# Patient Record
Sex: MALE | Race: WHITE | Employment: UNEMPLOYED | URBAN - METROPOLITAN AREA
[De-identification: names, ages, dates, MRNs, and addresses within clinical notes are randomized per-mention and may not be internally consistent; named-entity substitution may affect disease eponyms.]

---

## 2017-08-11 ENCOUNTER — APPOINTMENT (OUTPATIENT)
Dept: GENERAL RADIOLOGY | Age: 47
DRG: 313 | End: 2017-08-11
Payer: COMMERCIAL

## 2017-08-11 ENCOUNTER — APPOINTMENT (OUTPATIENT)
Dept: CT IMAGING | Age: 47
DRG: 313 | End: 2017-08-11
Payer: COMMERCIAL

## 2017-08-11 ENCOUNTER — HOSPITAL ENCOUNTER (OUTPATIENT)
Age: 47
Setting detail: OBSERVATION
Discharge: HOME | DRG: 313 | End: 2017-08-12
Attending: EMERGENCY MEDICINE | Admitting: INTERNAL MEDICINE
Payer: COMMERCIAL

## 2017-08-11 DIAGNOSIS — R07.89 OTHER CHEST PAIN: Primary | ICD-10-CM

## 2017-08-11 LAB
ABSOLUTE EOS #: 0.3 K/UL (ref 0–0.4)
ABSOLUTE LYMPH #: 2.4 K/UL (ref 1–4.8)
ABSOLUTE MONO #: 0.9 K/UL (ref 0.1–1.2)
ANION GAP SERPL CALCULATED.3IONS-SCNC: 11 MMOL/L (ref 9–17)
BASOPHILS # BLD: 0 %
BASOPHILS ABSOLUTE: 0 K/UL (ref 0–0.2)
BNP INTERPRETATION: NORMAL
BUN BLDV-MCNC: 18 MG/DL (ref 6–20)
BUN/CREAT BLD: NORMAL (ref 9–20)
CALCIUM SERPL-MCNC: 9.1 MG/DL (ref 8.6–10.4)
CHLORIDE BLD-SCNC: 101 MMOL/L (ref 98–107)
CO2: 28 MMOL/L (ref 20–31)
CREAT SERPL-MCNC: 0.71 MG/DL (ref 0.7–1.2)
DIFFERENTIAL TYPE: NORMAL
EOSINOPHILS RELATIVE PERCENT: 3 %
GFR AFRICAN AMERICAN: >60 ML/MIN
GFR NON-AFRICAN AMERICAN: >60 ML/MIN
GFR SERPL CREATININE-BSD FRML MDRD: NORMAL ML/MIN/{1.73_M2}
GFR SERPL CREATININE-BSD FRML MDRD: NORMAL ML/MIN/{1.73_M2}
GLUCOSE BLD-MCNC: 94 MG/DL (ref 70–99)
HCT VFR BLD CALC: 47.9 % (ref 41–53)
HEMOGLOBIN: 16.1 G/DL (ref 13.5–17.5)
LYMPHOCYTES # BLD: 22 %
MAGNESIUM: 2.4 MG/DL (ref 1.6–2.6)
MCH RBC QN AUTO: 30.3 PG (ref 26–34)
MCHC RBC AUTO-ENTMCNC: 33.5 G/DL (ref 31–37)
MCV RBC AUTO: 90.3 FL (ref 80–100)
MONOCYTES # BLD: 9 %
PDW BLD-RTO: 15.3 % (ref 12.5–15.4)
PLATELET # BLD: 237 K/UL (ref 140–450)
PLATELET ESTIMATE: NORMAL
PMV BLD AUTO: 8.6 FL (ref 6–12)
POC TROPONIN I: 0 NG/ML (ref 0–0.1)
POC TROPONIN I: 0 NG/ML (ref 0–0.1)
POC TROPONIN INTERP: NORMAL
POC TROPONIN INTERP: NORMAL
POTASSIUM SERPL-SCNC: 4.3 MMOL/L (ref 3.7–5.3)
PRO-BNP: 23 PG/ML
RBC # BLD: 5.31 M/UL (ref 4.5–5.9)
RBC # BLD: NORMAL 10*6/UL
SEG NEUTROPHILS: 66 %
SEGMENTED NEUTROPHILS ABSOLUTE COUNT: 7 K/UL (ref 1.8–7.7)
SODIUM BLD-SCNC: 140 MMOL/L (ref 135–144)
TROPONIN INTERP: NORMAL
TROPONIN T: <0.03 NG/ML
TSH SERPL DL<=0.05 MIU/L-ACNC: 0.98 MIU/L (ref 0.3–5)
WBC # BLD: 10.6 K/UL (ref 3.5–11)
WBC # BLD: NORMAL 10*3/UL

## 2017-08-11 PROCEDURE — 80048 BASIC METABOLIC PNL TOTAL CA: CPT

## 2017-08-11 PROCEDURE — 96372 THER/PROPH/DIAG INJ SC/IM: CPT

## 2017-08-11 PROCEDURE — 6370000000 HC RX 637 (ALT 250 FOR IP): Performed by: INTERNAL MEDICINE

## 2017-08-11 PROCEDURE — 99285 EMERGENCY DEPT VISIT HI MDM: CPT

## 2017-08-11 PROCEDURE — G0378 HOSPITAL OBSERVATION PER HR: HCPCS

## 2017-08-11 PROCEDURE — 93005 ELECTROCARDIOGRAM TRACING: CPT

## 2017-08-11 PROCEDURE — 6360000004 HC RX CONTRAST MEDICATION: Performed by: STUDENT IN AN ORGANIZED HEALTH CARE EDUCATION/TRAINING PROGRAM

## 2017-08-11 PROCEDURE — 84443 ASSAY THYROID STIM HORMONE: CPT

## 2017-08-11 PROCEDURE — 83735 ASSAY OF MAGNESIUM: CPT

## 2017-08-11 PROCEDURE — 1200000000 HC SEMI PRIVATE

## 2017-08-11 PROCEDURE — 84484 ASSAY OF TROPONIN QUANT: CPT

## 2017-08-11 PROCEDURE — 85025 COMPLETE CBC W/AUTO DIFF WBC: CPT

## 2017-08-11 PROCEDURE — 71275 CT ANGIOGRAPHY CHEST: CPT

## 2017-08-11 PROCEDURE — 2580000003 HC RX 258: Performed by: INTERNAL MEDICINE

## 2017-08-11 PROCEDURE — 6360000002 HC RX W HCPCS: Performed by: INTERNAL MEDICINE

## 2017-08-11 PROCEDURE — 6370000000 HC RX 637 (ALT 250 FOR IP): Performed by: STUDENT IN AN ORGANIZED HEALTH CARE EDUCATION/TRAINING PROGRAM

## 2017-08-11 PROCEDURE — 71010 XR CHEST PORTABLE: CPT

## 2017-08-11 PROCEDURE — 83880 ASSAY OF NATRIURETIC PEPTIDE: CPT

## 2017-08-11 PROCEDURE — 36415 COLL VENOUS BLD VENIPUNCTURE: CPT

## 2017-08-11 RX ORDER — METOPROLOL TARTRATE 5 MG/5ML
5 INJECTION INTRAVENOUS EVERY 6 HOURS PRN
Status: DISCONTINUED | OUTPATIENT
Start: 2017-08-11 | End: 2017-08-12 | Stop reason: HOSPADM

## 2017-08-11 RX ORDER — POTASSIUM CHLORIDE 20 MEQ/1
40 TABLET, EXTENDED RELEASE ORAL PRN
Status: DISCONTINUED | OUTPATIENT
Start: 2017-08-11 | End: 2017-08-12 | Stop reason: HOSPADM

## 2017-08-11 RX ORDER — NITROGLYCERIN 0.4 MG/1
0.4 TABLET SUBLINGUAL EVERY 5 MIN PRN
Status: DISCONTINUED | OUTPATIENT
Start: 2017-08-11 | End: 2017-08-12 | Stop reason: HOSPADM

## 2017-08-11 RX ORDER — POTASSIUM CHLORIDE 7.45 MG/ML
10 INJECTION INTRAVENOUS PRN
Status: DISCONTINUED | OUTPATIENT
Start: 2017-08-11 | End: 2017-08-12 | Stop reason: HOSPADM

## 2017-08-11 RX ORDER — ONDANSETRON 2 MG/ML
4 INJECTION INTRAMUSCULAR; INTRAVENOUS EVERY 6 HOURS PRN
Status: DISCONTINUED | OUTPATIENT
Start: 2017-08-11 | End: 2017-08-12 | Stop reason: HOSPADM

## 2017-08-11 RX ORDER — MAGNESIUM SULFATE 1 G/100ML
1 INJECTION INTRAVENOUS PRN
Status: DISCONTINUED | OUTPATIENT
Start: 2017-08-11 | End: 2017-08-12 | Stop reason: HOSPADM

## 2017-08-11 RX ORDER — MORPHINE SULFATE 2 MG/ML
2 INJECTION, SOLUTION INTRAMUSCULAR; INTRAVENOUS
Status: DISCONTINUED | OUTPATIENT
Start: 2017-08-11 | End: 2017-08-12 | Stop reason: HOSPADM

## 2017-08-11 RX ORDER — ACETAMINOPHEN 325 MG/1
650 TABLET ORAL EVERY 4 HOURS PRN
Status: DISCONTINUED | OUTPATIENT
Start: 2017-08-11 | End: 2017-08-12 | Stop reason: HOSPADM

## 2017-08-11 RX ORDER — MORPHINE SULFATE 4 MG/ML
4 INJECTION, SOLUTION INTRAMUSCULAR; INTRAVENOUS
Status: DISCONTINUED | OUTPATIENT
Start: 2017-08-11 | End: 2017-08-12 | Stop reason: HOSPADM

## 2017-08-11 RX ORDER — HYDROCODONE BITARTRATE AND ACETAMINOPHEN 5; 325 MG/1; MG/1
1 TABLET ORAL EVERY 4 HOURS PRN
Status: DISCONTINUED | OUTPATIENT
Start: 2017-08-11 | End: 2017-08-12 | Stop reason: HOSPADM

## 2017-08-11 RX ORDER — GABAPENTIN 400 MG/1
400 CAPSULE ORAL NIGHTLY
Status: ON HOLD | COMMUNITY
End: 2021-07-11 | Stop reason: ALTCHOICE

## 2017-08-11 RX ORDER — ALBUTEROL SULFATE 90 UG/1
6 AEROSOL, METERED RESPIRATORY (INHALATION) EVERY 6 HOURS
Status: DISCONTINUED | OUTPATIENT
Start: 2017-08-11 | End: 2017-08-11

## 2017-08-11 RX ORDER — ATORVASTATIN CALCIUM 40 MG/1
40 TABLET, FILM COATED ORAL DAILY
COMMUNITY

## 2017-08-11 RX ORDER — LISINOPRIL 10 MG/1
10 TABLET ORAL DAILY
Status: DISCONTINUED | OUTPATIENT
Start: 2017-08-11 | End: 2017-08-12

## 2017-08-11 RX ORDER — BISACODYL 10 MG
10 SUPPOSITORY, RECTAL RECTAL DAILY PRN
Status: DISCONTINUED | OUTPATIENT
Start: 2017-08-11 | End: 2017-08-12 | Stop reason: HOSPADM

## 2017-08-11 RX ORDER — DOCUSATE SODIUM 100 MG/1
100 CAPSULE, LIQUID FILLED ORAL 2 TIMES DAILY
Status: DISCONTINUED | OUTPATIENT
Start: 2017-08-11 | End: 2017-08-12 | Stop reason: HOSPADM

## 2017-08-11 RX ORDER — ASPIRIN 81 MG/1
324 TABLET, CHEWABLE ORAL ONCE
Status: COMPLETED | OUTPATIENT
Start: 2017-08-11 | End: 2017-08-11

## 2017-08-11 RX ORDER — METOPROLOL TARTRATE 50 MG/1
50 TABLET, FILM COATED ORAL 2 TIMES DAILY
Status: DISCONTINUED | OUTPATIENT
Start: 2017-08-11 | End: 2017-08-12 | Stop reason: HOSPADM

## 2017-08-11 RX ORDER — SODIUM CHLORIDE 0.9 % (FLUSH) 0.9 %
10 SYRINGE (ML) INJECTION PRN
Status: DISCONTINUED | OUTPATIENT
Start: 2017-08-11 | End: 2017-08-12 | Stop reason: HOSPADM

## 2017-08-11 RX ORDER — ALBUTEROL SULFATE 90 UG/1
2 AEROSOL, METERED RESPIRATORY (INHALATION) EVERY 4 HOURS PRN
Status: DISCONTINUED | OUTPATIENT
Start: 2017-08-11 | End: 2017-08-12 | Stop reason: HOSPADM

## 2017-08-11 RX ORDER — POTASSIUM CHLORIDE 20MEQ/15ML
40 LIQUID (ML) ORAL PRN
Status: DISCONTINUED | OUTPATIENT
Start: 2017-08-11 | End: 2017-08-12 | Stop reason: HOSPADM

## 2017-08-11 RX ORDER — LEVOTHYROXINE SODIUM 0.03 MG/1
25 TABLET ORAL DAILY
COMMUNITY

## 2017-08-11 RX ORDER — FAMOTIDINE 20 MG/1
20 TABLET, FILM COATED ORAL 2 TIMES DAILY
Status: DISCONTINUED | OUTPATIENT
Start: 2017-08-11 | End: 2017-08-12 | Stop reason: HOSPADM

## 2017-08-11 RX ORDER — HYDROCHLOROTHIAZIDE 25 MG/1
25 TABLET ORAL DAILY
Status: DISCONTINUED | OUTPATIENT
Start: 2017-08-11 | End: 2017-08-12 | Stop reason: HOSPADM

## 2017-08-11 RX ORDER — SODIUM CHLORIDE 0.9 % (FLUSH) 0.9 %
10 SYRINGE (ML) INJECTION EVERY 12 HOURS SCHEDULED
Status: DISCONTINUED | OUTPATIENT
Start: 2017-08-11 | End: 2017-08-12 | Stop reason: HOSPADM

## 2017-08-11 RX ADMIN — IOVERSOL 100 ML: 741 INJECTION INTRA-ARTERIAL; INTRAVENOUS at 12:26

## 2017-08-11 RX ADMIN — NITROGLYCERIN 0.4 MG: 0.4 TABLET, ORALLY DISINTEGRATING SUBLINGUAL at 13:24

## 2017-08-11 RX ADMIN — METOPROLOL TARTRATE 50 MG: 50 TABLET, FILM COATED ORAL at 17:59

## 2017-08-11 RX ADMIN — HYDROCHLOROTHIAZIDE 25 MG: 25 TABLET ORAL at 17:59

## 2017-08-11 RX ADMIN — LISINOPRIL 10 MG: 10 TABLET ORAL at 20:52

## 2017-08-11 RX ADMIN — FAMOTIDINE 20 MG: 20 TABLET, FILM COATED ORAL at 20:49

## 2017-08-11 RX ADMIN — DOCUSATE SODIUM 100 MG: 100 CAPSULE ORAL at 20:49

## 2017-08-11 RX ADMIN — Medication 10 ML: at 20:52

## 2017-08-11 RX ADMIN — NITROGLYCERIN 0.4 MG: 0.4 TABLET, ORALLY DISINTEGRATING SUBLINGUAL at 11:22

## 2017-08-11 RX ADMIN — ENOXAPARIN SODIUM 40 MG: 40 INJECTION SUBCUTANEOUS at 20:49

## 2017-08-11 RX ADMIN — ASPIRIN 324 MG: 81 TABLET ORAL at 11:00

## 2017-08-11 ASSESSMENT — PAIN DESCRIPTION - LOCATION: LOCATION: CHEST

## 2017-08-11 ASSESSMENT — ENCOUNTER SYMPTOMS
NAUSEA: 0
SHORTNESS OF BREATH: 1
BACK PAIN: 0
SORE THROAT: 0
ABDOMINAL PAIN: 0
TROUBLE SWALLOWING: 0
WHEEZING: 0
VOMITING: 0
COUGH: 0
CHEST TIGHTNESS: 1

## 2017-08-11 ASSESSMENT — PAIN SCALES - GENERAL
PAINLEVEL_OUTOF10: 7
PAINLEVEL_OUTOF10: 3

## 2017-08-11 ASSESSMENT — PAIN DESCRIPTION - PAIN TYPE: TYPE: ACUTE PAIN

## 2017-08-12 ENCOUNTER — APPOINTMENT (OUTPATIENT)
Dept: NUCLEAR MEDICINE | Age: 47
DRG: 313 | End: 2017-08-12
Payer: COMMERCIAL

## 2017-08-12 VITALS
DIASTOLIC BLOOD PRESSURE: 102 MMHG | TEMPERATURE: 97.7 F | BODY MASS INDEX: 38.89 KG/M2 | WEIGHT: 287.1 LBS | HEART RATE: 70 BPM | OXYGEN SATURATION: 91 % | HEIGHT: 72 IN | RESPIRATION RATE: 20 BRPM | SYSTOLIC BLOOD PRESSURE: 158 MMHG

## 2017-08-12 PROBLEM — Z91.199 NONCOMPLIANCE: Status: ACTIVE | Noted: 2017-08-12

## 2017-08-12 PROBLEM — I10 ACCELERATED HYPERTENSION: Status: ACTIVE | Noted: 2017-08-12

## 2017-08-12 PROBLEM — Z72.0 TOBACCO ABUSE: Status: ACTIVE | Noted: 2017-08-12

## 2017-08-12 PROBLEM — R07.89 ATYPICAL CHEST PAIN: Status: ACTIVE | Noted: 2017-08-12

## 2017-08-12 LAB
ALBUMIN SERPL-MCNC: 4 G/DL (ref 3.5–5.2)
ALBUMIN/GLOBULIN RATIO: 1.5 (ref 1–2.5)
ALP BLD-CCNC: 104 U/L (ref 40–129)
ALT SERPL-CCNC: 40 U/L (ref 5–41)
ANION GAP SERPL CALCULATED.3IONS-SCNC: 12 MMOL/L (ref 9–17)
AST SERPL-CCNC: 26 U/L
BILIRUB SERPL-MCNC: 0.36 MG/DL (ref 0.3–1.2)
BUN BLDV-MCNC: 14 MG/DL (ref 6–20)
BUN/CREAT BLD: ABNORMAL (ref 9–20)
CALCIUM SERPL-MCNC: 8.6 MG/DL (ref 8.6–10.4)
CHLORIDE BLD-SCNC: 95 MMOL/L (ref 98–107)
CHOLESTEROL/HDL RATIO: 3.9
CHOLESTEROL: 124 MG/DL
CO2: 24 MMOL/L (ref 20–31)
CREAT SERPL-MCNC: 0.67 MG/DL (ref 0.7–1.2)
GFR AFRICAN AMERICAN: >60 ML/MIN
GFR NON-AFRICAN AMERICAN: >60 ML/MIN
GFR SERPL CREATININE-BSD FRML MDRD: ABNORMAL ML/MIN/{1.73_M2}
GFR SERPL CREATININE-BSD FRML MDRD: ABNORMAL ML/MIN/{1.73_M2}
GLUCOSE BLD-MCNC: 116 MG/DL (ref 70–99)
HCT VFR BLD CALC: 49.9 % (ref 41–53)
HDLC SERPL-MCNC: 32 MG/DL
HEMOGLOBIN: 16.2 G/DL (ref 13.5–17.5)
LDL CHOLESTEROL: 51 MG/DL (ref 0–130)
LV EF: 48 %
LVEF MODALITY: NORMAL
MCH RBC QN AUTO: 29.7 PG (ref 26–34)
MCHC RBC AUTO-ENTMCNC: 32.6 G/DL (ref 31–37)
MCV RBC AUTO: 91.1 FL (ref 80–100)
PDW BLD-RTO: 15.6 % (ref 12.5–15.4)
PLATELET # BLD: 222 K/UL (ref 140–450)
PMV BLD AUTO: 8.5 FL (ref 6–12)
POTASSIUM SERPL-SCNC: 4.3 MMOL/L (ref 3.7–5.3)
RBC # BLD: 5.47 M/UL (ref 4.5–5.9)
SODIUM BLD-SCNC: 131 MMOL/L (ref 135–144)
TOTAL PROTEIN: 6.7 G/DL (ref 6.4–8.3)
TRIGL SERPL-MCNC: 204 MG/DL
VLDLC SERPL CALC-MCNC: ABNORMAL MG/DL (ref 1–30)
WBC # BLD: 11.6 K/UL (ref 3.5–11)

## 2017-08-12 PROCEDURE — 2500000003 HC RX 250 WO HCPCS: Performed by: STUDENT IN AN ORGANIZED HEALTH CARE EDUCATION/TRAINING PROGRAM

## 2017-08-12 PROCEDURE — 6370000000 HC RX 637 (ALT 250 FOR IP): Performed by: INTERNAL MEDICINE

## 2017-08-12 PROCEDURE — 36415 COLL VENOUS BLD VENIPUNCTURE: CPT

## 2017-08-12 PROCEDURE — 2580000003 HC RX 258: Performed by: INTERNAL MEDICINE

## 2017-08-12 PROCEDURE — 93017 CV STRESS TEST TRACING ONLY: CPT | Performed by: NURSE PRACTITIONER

## 2017-08-12 PROCEDURE — G0378 HOSPITAL OBSERVATION PER HR: HCPCS

## 2017-08-12 PROCEDURE — A9500 TC99M SESTAMIBI: HCPCS | Performed by: INTERNAL MEDICINE

## 2017-08-12 PROCEDURE — 78452 HT MUSCLE IMAGE SPECT MULT: CPT

## 2017-08-12 PROCEDURE — 85027 COMPLETE CBC AUTOMATED: CPT

## 2017-08-12 PROCEDURE — 99222 1ST HOSP IP/OBS MODERATE 55: CPT | Performed by: INTERNAL MEDICINE

## 2017-08-12 PROCEDURE — 80061 LIPID PANEL: CPT

## 2017-08-12 PROCEDURE — 3430000000 HC RX DIAGNOSTIC RADIOPHARMACEUTICAL: Performed by: INTERNAL MEDICINE

## 2017-08-12 PROCEDURE — 80053 COMPREHEN METABOLIC PANEL: CPT

## 2017-08-12 PROCEDURE — 6360000002 HC RX W HCPCS: Performed by: INTERNAL MEDICINE

## 2017-08-12 PROCEDURE — 96374 THER/PROPH/DIAG INJ IV PUSH: CPT

## 2017-08-12 RX ORDER — AMINOPHYLLINE DIHYDRATE 25 MG/ML
100 INJECTION, SOLUTION INTRAVENOUS
Status: COMPLETED | OUTPATIENT
Start: 2017-08-12 | End: 2017-08-12

## 2017-08-12 RX ORDER — SODIUM CHLORIDE 9 MG/ML
INJECTION, SOLUTION INTRAVENOUS ONCE
Status: COMPLETED | OUTPATIENT
Start: 2017-08-12 | End: 2017-08-12

## 2017-08-12 RX ORDER — HYDROCHLOROTHIAZIDE 25 MG/1
25 TABLET ORAL DAILY
Qty: 30 TABLET | Refills: 1 | Status: ON HOLD | OUTPATIENT
Start: 2017-08-12 | End: 2021-07-11 | Stop reason: ALTCHOICE

## 2017-08-12 RX ORDER — METOPROLOL TARTRATE 50 MG/1
50 TABLET, FILM COATED ORAL 2 TIMES DAILY
Qty: 60 TABLET | Refills: 1 | Status: ON HOLD | OUTPATIENT
Start: 2017-08-12 | End: 2021-07-11 | Stop reason: ALTCHOICE

## 2017-08-12 RX ORDER — SODIUM CHLORIDE 0.9 % (FLUSH) 0.9 %
10 SYRINGE (ML) INJECTION 2 TIMES DAILY
Status: DISCONTINUED | OUTPATIENT
Start: 2017-08-12 | End: 2017-08-12 | Stop reason: HOSPADM

## 2017-08-12 RX ORDER — LISINOPRIL 20 MG/1
20 TABLET ORAL DAILY
Qty: 30 TABLET | Refills: 1 | Status: ON HOLD | OUTPATIENT
Start: 2017-08-13 | End: 2021-07-11 | Stop reason: ALTCHOICE

## 2017-08-12 RX ORDER — SODIUM CHLORIDE 0.9 % (FLUSH) 0.9 %
10 SYRINGE (ML) INJECTION PRN
Status: DISCONTINUED | OUTPATIENT
Start: 2017-08-12 | End: 2017-08-12

## 2017-08-12 RX ORDER — NITROGLYCERIN 0.4 MG/1
0.4 TABLET SUBLINGUAL EVERY 5 MIN PRN
Status: DISCONTINUED | OUTPATIENT
Start: 2017-08-12 | End: 2017-08-12 | Stop reason: SDUPTHER

## 2017-08-12 RX ORDER — LISINOPRIL 20 MG/1
20 TABLET ORAL DAILY
Status: DISCONTINUED | OUTPATIENT
Start: 2017-08-13 | End: 2017-08-12 | Stop reason: HOSPADM

## 2017-08-12 RX ORDER — METOPROLOL TARTRATE 5 MG/5ML
2.5 INJECTION INTRAVENOUS PRN
Status: DISCONTINUED | OUTPATIENT
Start: 2017-08-12 | End: 2017-08-12

## 2017-08-12 RX ORDER — LISINOPRIL 5 MG/1
10 TABLET ORAL ONCE
Status: COMPLETED | OUTPATIENT
Start: 2017-08-12 | End: 2017-08-12

## 2017-08-12 RX ORDER — LISINOPRIL 10 MG/1
10 TABLET ORAL DAILY
Qty: 30 TABLET | Refills: 1 | Status: CANCELLED | OUTPATIENT
Start: 2017-08-12

## 2017-08-12 RX ADMIN — METOPROLOL TARTRATE 50 MG: 50 TABLET, FILM COATED ORAL at 12:07

## 2017-08-12 RX ADMIN — ASPIRIN 325 MG: 325 TABLET, COATED ORAL at 12:07

## 2017-08-12 RX ADMIN — LISINOPRIL 10 MG: 10 TABLET ORAL at 08:04

## 2017-08-12 RX ADMIN — DOCUSATE SODIUM 100 MG: 100 CAPSULE ORAL at 12:07

## 2017-08-12 RX ADMIN — HYDROCHLOROTHIAZIDE 25 MG: 25 TABLET ORAL at 08:04

## 2017-08-12 RX ADMIN — Medication 10 ML: at 09:08

## 2017-08-12 RX ADMIN — TETRAKIS(2-METHOXYISOBUTYLISOCYANIDE)COPPER(I) TETRAFLUOROBORATE 40 MILLICURIE: 1 INJECTION, POWDER, LYOPHILIZED, FOR SOLUTION INTRAVENOUS at 10:10

## 2017-08-12 RX ADMIN — AMINOPHYLLINE 100 MG: 25 INJECTION, SOLUTION INTRAVENOUS at 10:12

## 2017-08-12 RX ADMIN — METOPROLOL TARTRATE 5 MG: 5 INJECTION INTRAVENOUS at 07:13

## 2017-08-12 RX ADMIN — SODIUM CHLORIDE, PRESERVATIVE FREE 10 ML: 5 INJECTION INTRAVENOUS at 07:30

## 2017-08-12 RX ADMIN — SODIUM CHLORIDE, PRESERVATIVE FREE 10 ML: 5 INJECTION INTRAVENOUS at 10:10

## 2017-08-12 RX ADMIN — TETRAKIS(2-METHOXYISOBUTYLISOCYANIDE)COPPER(I) TETRAFLUOROBORATE 18 MILLICURIE: 1 INJECTION, POWDER, LYOPHILIZED, FOR SOLUTION INTRAVENOUS at 07:30

## 2017-08-12 RX ADMIN — REGADENOSON 0.4 MG: 0.08 INJECTION, SOLUTION INTRAVENOUS at 10:10

## 2017-08-12 RX ADMIN — FAMOTIDINE 20 MG: 20 TABLET, FILM COATED ORAL at 12:06

## 2017-08-12 RX ADMIN — SODIUM CHLORIDE: 9 INJECTION, SOLUTION INTRAVENOUS at 09:12

## 2017-08-12 RX ADMIN — LISINOPRIL 10 MG: 20 TABLET ORAL at 14:45

## 2017-08-13 LAB
EKG ATRIAL RATE: 86 BPM
EKG ATRIAL RATE: 90 BPM
EKG ATRIAL RATE: 96 BPM
EKG P AXIS: 60 DEGREES
EKG P AXIS: 64 DEGREES
EKG P AXIS: 64 DEGREES
EKG P-R INTERVAL: 146 MS
EKG P-R INTERVAL: 150 MS
EKG P-R INTERVAL: 152 MS
EKG Q-T INTERVAL: 360 MS
EKG Q-T INTERVAL: 382 MS
EKG Q-T INTERVAL: 398 MS
EKG QRS DURATION: 90 MS
EKG QRS DURATION: 92 MS
EKG QRS DURATION: 96 MS
EKG QTC CALCULATION (BAZETT): 454 MS
EKG QTC CALCULATION (BAZETT): 467 MS
EKG QTC CALCULATION (BAZETT): 476 MS
EKG R AXIS: 51 DEGREES
EKG R AXIS: 59 DEGREES
EKG R AXIS: 65 DEGREES
EKG T AXIS: 63 DEGREES
EKG T AXIS: 70 DEGREES
EKG T AXIS: 73 DEGREES
EKG VENTRICULAR RATE: 86 BPM
EKG VENTRICULAR RATE: 90 BPM
EKG VENTRICULAR RATE: 96 BPM

## 2020-11-03 PROBLEM — I10 HYPERTENSION: Status: RESOLVED | Noted: 2020-11-03 | Resolved: 2020-11-03

## 2021-07-11 ENCOUNTER — HOSPITAL ENCOUNTER (INPATIENT)
Age: 51
LOS: 4 days | Discharge: HOME | DRG: 194 | End: 2021-07-15
Attending: EMERGENCY MEDICINE | Admitting: INTERNAL MEDICINE
Payer: MEDICAID

## 2021-07-11 ENCOUNTER — APPOINTMENT (OUTPATIENT)
Dept: GENERAL RADIOLOGY | Age: 51
DRG: 194 | End: 2021-07-11
Payer: MEDICAID

## 2021-07-11 DIAGNOSIS — I16.0 HYPERTENSIVE URGENCY: Primary | ICD-10-CM

## 2021-07-11 DIAGNOSIS — R77.8 ELEVATED TROPONIN: ICD-10-CM

## 2021-07-11 DIAGNOSIS — I50.9 ACUTE CONGESTIVE HEART FAILURE, UNSPECIFIED HEART FAILURE TYPE (HCC): ICD-10-CM

## 2021-07-11 PROBLEM — I16.1 HYPERTENSIVE EMERGENCY: Status: ACTIVE | Noted: 2021-07-11

## 2021-07-11 LAB
ABSOLUTE EOS #: 0 K/UL (ref 0–0.4)
ABSOLUTE IMMATURE GRANULOCYTE: ABNORMAL K/UL (ref 0–0.3)
ABSOLUTE LYMPH #: 0.5 K/UL (ref 1–4.8)
ABSOLUTE MONO #: 0.2 K/UL (ref 0.1–1.2)
ALBUMIN SERPL-MCNC: 4 G/DL (ref 3.5–5.2)
ALBUMIN/GLOBULIN RATIO: 1.2 (ref 1–2.5)
ALP BLD-CCNC: 118 U/L (ref 40–129)
ALT SERPL-CCNC: 56 U/L (ref 5–41)
ANION GAP SERPL CALCULATED.3IONS-SCNC: 15 MMOL/L (ref 9–17)
AST SERPL-CCNC: 29 U/L
BASOPHILS # BLD: 1 % (ref 0–2)
BASOPHILS ABSOLUTE: 0.1 K/UL (ref 0–0.2)
BILIRUB SERPL-MCNC: 0.37 MG/DL (ref 0.3–1.2)
BILIRUBIN URINE: NEGATIVE
BNP INTERPRETATION: ABNORMAL
BUN BLDV-MCNC: 19 MG/DL (ref 6–20)
BUN/CREAT BLD: ABNORMAL (ref 9–20)
CALCIUM SERPL-MCNC: 9.2 MG/DL (ref 8.6–10.4)
CHLORIDE BLD-SCNC: 101 MMOL/L (ref 98–107)
CO2: 21 MMOL/L (ref 20–31)
COLOR: YELLOW
COMMENT UA: ABNORMAL
CREAT SERPL-MCNC: 1 MG/DL (ref 0.7–1.2)
DIFFERENTIAL TYPE: ABNORMAL
EOSINOPHILS RELATIVE PERCENT: 0 % (ref 1–4)
GFR AFRICAN AMERICAN: >60 ML/MIN
GFR NON-AFRICAN AMERICAN: >60 ML/MIN
GFR SERPL CREATININE-BSD FRML MDRD: ABNORMAL ML/MIN/{1.73_M2}
GFR SERPL CREATININE-BSD FRML MDRD: ABNORMAL ML/MIN/{1.73_M2}
GLUCOSE BLD-MCNC: 162 MG/DL (ref 70–99)
GLUCOSE URINE: ABNORMAL
HCT VFR BLD CALC: 53.5 % (ref 41–53)
HEMOGLOBIN: 17.3 G/DL (ref 13.5–17.5)
IMMATURE GRANULOCYTES: ABNORMAL %
KETONES, URINE: NEGATIVE
LEUKOCYTE ESTERASE, URINE: NEGATIVE
LIPASE: 18 U/L (ref 13–60)
LYMPHOCYTES # BLD: 5 % (ref 24–44)
MCH RBC QN AUTO: 30.3 PG (ref 26–34)
MCHC RBC AUTO-ENTMCNC: 32.4 G/DL (ref 31–37)
MCV RBC AUTO: 93.8 FL (ref 80–100)
MONOCYTES # BLD: 2 % (ref 2–11)
NITRITE, URINE: NEGATIVE
NRBC AUTOMATED: ABNORMAL PER 100 WBC
PDW BLD-RTO: 16.4 % (ref 12.5–15.4)
PH UA: 5.5 (ref 5–8)
PLATELET # BLD: 218 K/UL (ref 140–450)
PLATELET ESTIMATE: ABNORMAL
PMV BLD AUTO: 8.4 FL (ref 6–12)
POTASSIUM SERPL-SCNC: 4.6 MMOL/L (ref 3.7–5.3)
PRO-BNP: 604 PG/ML
PROTEIN UA: NEGATIVE
RBC # BLD: 5.71 M/UL (ref 4.5–5.9)
RBC # BLD: ABNORMAL 10*6/UL
SEG NEUTROPHILS: 92 % (ref 36–66)
SEGMENTED NEUTROPHILS ABSOLUTE COUNT: 10 K/UL (ref 1.8–7.7)
SODIUM BLD-SCNC: 137 MMOL/L (ref 135–144)
SPECIFIC GRAVITY UA: 1.02 (ref 1–1.03)
TOTAL PROTEIN: 7.3 G/DL (ref 6.4–8.3)
TROPONIN INTERP: ABNORMAL
TROPONIN INTERP: ABNORMAL
TROPONIN T: ABNORMAL NG/ML
TROPONIN T: ABNORMAL NG/ML
TROPONIN, HIGH SENSITIVITY: 24 NG/L (ref 0–22)
TROPONIN, HIGH SENSITIVITY: 27 NG/L (ref 0–22)
TURBIDITY: CLEAR
URINE HGB: NEGATIVE
UROBILINOGEN, URINE: NORMAL
WBC # BLD: 10.8 K/UL (ref 3.5–11)
WBC # BLD: ABNORMAL 10*3/UL

## 2021-07-11 PROCEDURE — 83036 HEMOGLOBIN GLYCOSYLATED A1C: CPT

## 2021-07-11 PROCEDURE — 6370000000 HC RX 637 (ALT 250 FOR IP): Performed by: NURSE PRACTITIONER

## 2021-07-11 PROCEDURE — 93005 ELECTROCARDIOGRAM TRACING: CPT | Performed by: PHYSICIAN ASSISTANT

## 2021-07-11 PROCEDURE — 71046 X-RAY EXAM CHEST 2 VIEWS: CPT

## 2021-07-11 PROCEDURE — 1210000000 HC MED SURG R&B

## 2021-07-11 PROCEDURE — 85025 COMPLETE CBC W/AUTO DIFF WBC: CPT

## 2021-07-11 PROCEDURE — 81003 URINALYSIS AUTO W/O SCOPE: CPT

## 2021-07-11 PROCEDURE — 83880 ASSAY OF NATRIURETIC PEPTIDE: CPT

## 2021-07-11 PROCEDURE — 94660 CPAP INITIATION&MGMT: CPT

## 2021-07-11 PROCEDURE — 2500000003 HC RX 250 WO HCPCS: Performed by: PHYSICIAN ASSISTANT

## 2021-07-11 PROCEDURE — 2580000003 HC RX 258: Performed by: NURSE PRACTITIONER

## 2021-07-11 PROCEDURE — 6360000002 HC RX W HCPCS: Performed by: PHYSICIAN ASSISTANT

## 2021-07-11 PROCEDURE — 80053 COMPREHEN METABOLIC PANEL: CPT

## 2021-07-11 PROCEDURE — 99285 EMERGENCY DEPT VISIT HI MDM: CPT

## 2021-07-11 PROCEDURE — 84484 ASSAY OF TROPONIN QUANT: CPT

## 2021-07-11 PROCEDURE — 83690 ASSAY OF LIPASE: CPT

## 2021-07-11 PROCEDURE — 2700000000 HC OXYGEN THERAPY PER DAY

## 2021-07-11 PROCEDURE — 2000000000 HC ICU R&B

## 2021-07-11 PROCEDURE — 36415 COLL VENOUS BLD VENIPUNCTURE: CPT

## 2021-07-11 PROCEDURE — 80307 DRUG TEST PRSMV CHEM ANLYZR: CPT

## 2021-07-11 RX ORDER — EMPAGLIFLOZIN 10 MG/1
10 TABLET, FILM COATED ORAL DAILY
Status: ON HOLD | COMMUNITY
End: 2021-07-15 | Stop reason: HOSPADM

## 2021-07-11 RX ORDER — NICOTINE POLACRILEX 4 MG
15 LOZENGE BUCCAL PRN
Status: DISCONTINUED | OUTPATIENT
Start: 2021-07-11 | End: 2021-07-15 | Stop reason: HOSPADM

## 2021-07-11 RX ORDER — ONDANSETRON 4 MG/1
4 TABLET, ORALLY DISINTEGRATING ORAL EVERY 8 HOURS PRN
Status: DISCONTINUED | OUTPATIENT
Start: 2021-07-11 | End: 2021-07-15 | Stop reason: HOSPADM

## 2021-07-11 RX ORDER — LORAZEPAM 1 MG/1
3 TABLET ORAL
Status: DISCONTINUED | OUTPATIENT
Start: 2021-07-11 | End: 2021-07-14

## 2021-07-11 RX ORDER — MONTELUKAST SODIUM 10 MG/1
10 TABLET ORAL NIGHTLY
Status: DISCONTINUED | OUTPATIENT
Start: 2021-07-11 | End: 2021-07-15 | Stop reason: HOSPADM

## 2021-07-11 RX ORDER — SODIUM CHLORIDE 9 MG/ML
25 INJECTION, SOLUTION INTRAVENOUS PRN
Status: DISCONTINUED | OUTPATIENT
Start: 2021-07-11 | End: 2021-07-15 | Stop reason: HOSPADM

## 2021-07-11 RX ORDER — ACETAMINOPHEN 650 MG/1
650 SUPPOSITORY RECTAL EVERY 6 HOURS PRN
Status: DISCONTINUED | OUTPATIENT
Start: 2021-07-11 | End: 2021-07-15 | Stop reason: HOSPADM

## 2021-07-11 RX ORDER — NICOTINE 21 MG/24HR
1 PATCH, TRANSDERMAL 24 HOURS TRANSDERMAL DAILY
Status: DISCONTINUED | OUTPATIENT
Start: 2021-07-12 | End: 2021-07-11

## 2021-07-11 RX ORDER — DULOXETIN HYDROCHLORIDE 60 MG/1
60 CAPSULE, DELAYED RELEASE ORAL DAILY
COMMUNITY
End: 2021-07-20 | Stop reason: SDUPTHER

## 2021-07-11 RX ORDER — ONDANSETRON 2 MG/ML
4 INJECTION INTRAMUSCULAR; INTRAVENOUS EVERY 6 HOURS PRN
Status: DISCONTINUED | OUTPATIENT
Start: 2021-07-11 | End: 2021-07-15 | Stop reason: HOSPADM

## 2021-07-11 RX ORDER — SODIUM CHLORIDE 0.9 % (FLUSH) 0.9 %
5-40 SYRINGE (ML) INJECTION EVERY 12 HOURS SCHEDULED
Status: DISCONTINUED | OUTPATIENT
Start: 2021-07-11 | End: 2021-07-15 | Stop reason: HOSPADM

## 2021-07-11 RX ORDER — BUPROPION HYDROCHLORIDE 300 MG/1
300 TABLET ORAL EVERY MORNING
COMMUNITY

## 2021-07-11 RX ORDER — ACETAMINOPHEN 325 MG/1
650 TABLET ORAL EVERY 6 HOURS PRN
Status: DISCONTINUED | OUTPATIENT
Start: 2021-07-11 | End: 2021-07-15 | Stop reason: HOSPADM

## 2021-07-11 RX ORDER — FUROSEMIDE 10 MG/ML
40 INJECTION INTRAMUSCULAR; INTRAVENOUS ONCE
Status: COMPLETED | OUTPATIENT
Start: 2021-07-11 | End: 2021-07-11

## 2021-07-11 RX ORDER — BUPROPION HYDROCHLORIDE 150 MG/1
300 TABLET ORAL EVERY MORNING
Status: DISCONTINUED | OUTPATIENT
Start: 2021-07-12 | End: 2021-07-15 | Stop reason: HOSPADM

## 2021-07-11 RX ORDER — IRBESARTAN 300 MG/1
300 TABLET ORAL NIGHTLY
Status: ON HOLD | COMMUNITY
End: 2021-07-11 | Stop reason: ALTCHOICE

## 2021-07-11 RX ORDER — LORAZEPAM 1 MG/1
4 TABLET ORAL
Status: DISCONTINUED | OUTPATIENT
Start: 2021-07-11 | End: 2021-07-14

## 2021-07-11 RX ORDER — LORAZEPAM 2 MG/ML
2 INJECTION INTRAMUSCULAR
Status: DISCONTINUED | OUTPATIENT
Start: 2021-07-11 | End: 2021-07-14

## 2021-07-11 RX ORDER — SODIUM CHLORIDE 0.9 % (FLUSH) 0.9 %
5-40 SYRINGE (ML) INJECTION PRN
Status: DISCONTINUED | OUTPATIENT
Start: 2021-07-11 | End: 2021-07-15 | Stop reason: HOSPADM

## 2021-07-11 RX ORDER — LORAZEPAM 1 MG/1
1 TABLET ORAL
Status: DISCONTINUED | OUTPATIENT
Start: 2021-07-11 | End: 2021-07-14

## 2021-07-11 RX ORDER — LORAZEPAM 1 MG/1
2 TABLET ORAL
Status: DISCONTINUED | OUTPATIENT
Start: 2021-07-11 | End: 2021-07-14

## 2021-07-11 RX ORDER — MONTELUKAST SODIUM 10 MG/1
10 TABLET ORAL NIGHTLY
Status: ON HOLD | COMMUNITY
End: 2021-07-15 | Stop reason: SDUPTHER

## 2021-07-11 RX ORDER — LORAZEPAM 2 MG/ML
3 INJECTION INTRAMUSCULAR
Status: DISCONTINUED | OUTPATIENT
Start: 2021-07-11 | End: 2021-07-14

## 2021-07-11 RX ORDER — GABAPENTIN 400 MG/1
400 CAPSULE ORAL NIGHTLY
Status: DISCONTINUED | OUTPATIENT
Start: 2021-07-11 | End: 2021-07-11

## 2021-07-11 RX ORDER — DEXTROSE MONOHYDRATE 50 MG/ML
100 INJECTION, SOLUTION INTRAVENOUS PRN
Status: DISCONTINUED | OUTPATIENT
Start: 2021-07-11 | End: 2021-07-15 | Stop reason: HOSPADM

## 2021-07-11 RX ORDER — LORAZEPAM 2 MG/ML
4 INJECTION INTRAMUSCULAR
Status: DISCONTINUED | OUTPATIENT
Start: 2021-07-11 | End: 2021-07-14

## 2021-07-11 RX ORDER — HYDROCHLOROTHIAZIDE 25 MG/1
25 TABLET ORAL DAILY
Status: DISCONTINUED | OUTPATIENT
Start: 2021-07-12 | End: 2021-07-15

## 2021-07-11 RX ORDER — NITROGLYCERIN 20 MG/100ML
5-200 INJECTION INTRAVENOUS CONTINUOUS
Status: DISCONTINUED | OUTPATIENT
Start: 2021-07-11 | End: 2021-07-12

## 2021-07-11 RX ORDER — NICOTINE 21 MG/24HR
1 PATCH, TRANSDERMAL 24 HOURS TRANSDERMAL DAILY
Status: DISCONTINUED | OUTPATIENT
Start: 2021-07-11 | End: 2021-07-15 | Stop reason: HOSPADM

## 2021-07-11 RX ORDER — EMPAGLIFLOZIN 10 MG/1
10 TABLET, FILM COATED ORAL DAILY
COMMUNITY
End: 2021-07-20 | Stop reason: SDUPTHER

## 2021-07-11 RX ORDER — DEXTROSE MONOHYDRATE 25 G/50ML
12.5 INJECTION, SOLUTION INTRAVENOUS PRN
Status: DISCONTINUED | OUTPATIENT
Start: 2021-07-11 | End: 2021-07-15 | Stop reason: HOSPADM

## 2021-07-11 RX ORDER — ALBUTEROL SULFATE 90 UG/1
1 AEROSOL, METERED RESPIRATORY (INHALATION) EVERY 4 HOURS PRN
Status: DISCONTINUED | OUTPATIENT
Start: 2021-07-11 | End: 2021-07-15 | Stop reason: HOSPADM

## 2021-07-11 RX ORDER — METOPROLOL TARTRATE 50 MG/1
50 TABLET, FILM COATED ORAL 2 TIMES DAILY
Status: DISCONTINUED | OUTPATIENT
Start: 2021-07-11 | End: 2021-07-13

## 2021-07-11 RX ORDER — LOSARTAN POTASSIUM 50 MG/1
100 TABLET ORAL DAILY
Status: DISCONTINUED | OUTPATIENT
Start: 2021-07-12 | End: 2021-07-15 | Stop reason: HOSPADM

## 2021-07-11 RX ORDER — LEVOTHYROXINE SODIUM 0.03 MG/1
25 TABLET ORAL DAILY
Status: DISCONTINUED | OUTPATIENT
Start: 2021-07-12 | End: 2021-07-15 | Stop reason: HOSPADM

## 2021-07-11 RX ORDER — ATORVASTATIN CALCIUM 40 MG/1
40 TABLET, FILM COATED ORAL DAILY
Status: DISCONTINUED | OUTPATIENT
Start: 2021-07-12 | End: 2021-07-15 | Stop reason: HOSPADM

## 2021-07-11 RX ORDER — LORAZEPAM 2 MG/ML
1 INJECTION INTRAMUSCULAR
Status: DISCONTINUED | OUTPATIENT
Start: 2021-07-11 | End: 2021-07-14

## 2021-07-11 RX ORDER — DULOXETIN HYDROCHLORIDE 30 MG/1
60 CAPSULE, DELAYED RELEASE ORAL DAILY
Status: DISCONTINUED | OUTPATIENT
Start: 2021-07-12 | End: 2021-07-15 | Stop reason: HOSPADM

## 2021-07-11 RX ADMIN — METOPROLOL TARTRATE 50 MG: 50 TABLET, FILM COATED ORAL at 23:22

## 2021-07-11 RX ADMIN — MONTELUKAST SODIUM 10 MG: 10 TABLET, FILM COATED ORAL at 22:57

## 2021-07-11 RX ADMIN — FUROSEMIDE 40 MG: 10 INJECTION INTRAMUSCULAR; INTRAVENOUS at 19:15

## 2021-07-11 RX ADMIN — NITROGLYCERIN 5 MCG/MIN: 20 INJECTION INTRAVENOUS at 18:41

## 2021-07-11 RX ADMIN — SODIUM CHLORIDE 25 ML: 9 INJECTION, SOLUTION INTRAVENOUS at 22:30

## 2021-07-11 RX ADMIN — LORAZEPAM 1 MG: 1 TABLET ORAL at 23:22

## 2021-07-11 ASSESSMENT — PAIN SCALES - GENERAL: PAINLEVEL_OUTOF10: 0

## 2021-07-11 NOTE — ED PROVIDER NOTES
[id number] [facility] ED  [address] [facility] [address] [facility] [address]  Phone: [phone]  Fax: [phone]        Pt Name: Alissa Ibrahim  MRN: [id number]  [place] 1970  Date of evaluation: 7/11/21    [address] [place] [address]       Chief Complaint   Patient presents with    Shortness of Breath     ongoing    Chest Pain       HISTORY OF PRESENT ILLNESS (Location/Symptom, Timing/Onset, Context/Setting, Quality, Duration, Modifying Factors, Severity)      Alissa Ibrahim is a 44 y.o. male current smoker with pertinent PMH of hypertension, hyperlipidemia, diabetes, COPD who presents to the ED via private auto with chest pain and shortness of breath. Patient reports that for the past 2 months he has been experiencing gradually worsening shortness of breath and bilateral lower extremity edema. He also notes that he has felt bloated and has noticed some weight gain. Patient reports that since last night he has been experiencing constant chest pressure. His shortness of breath and chest pressure do exacerbate with exertion. Denies history of cardiac disease. Does have a family history of cardiac disease though. Patient mentions that he has had high blood pressure ever since he was 15 and is always been on blood pressure medication. He takes this regularly and follows up with his PCP regularly though notes that his blood pressure is always elevated. Patient also reports that he was sent for medic prior to coming here and they advised admission, however patient did not want to stay so he left AMA. Patient returns after being prompted by his wife to return because the chest pain was not improving. Denies history of blood clots, clotting disorders, or malignancy. Denies recent trauma, surgery, or extended travel. Denies hemoptysis, calf pain, or unilateral leg swelling. No use of hormones.  Denies any fever, chills, abdominal pain, vomiting, diarrhea, cough different from chronic cough, or any other concerns this time.    PAST MEDICAL / SURGICAL / SOCIAL / FAMILY HISTORY     PMH:  has a past medical history of Arthritis, COPD (chronic obstructive pulmonary disease) ([address] Hyperlipidemia, and Hypertension. Surgical History:  has a past surgical history that includes Ankle surgery. Social History:  reports that he has been smoking cigarettes. He has a 30.00 pack-year smoking history. He does not have any smokeless tobacco history on file. He reports current alcohol use of about 13.0 - 14.0 standard drinks of alcohol per week. He reports that he does not use drugs. Family History: He indicated that the status of his mother is unknown. He indicated that the status of his sister is unknown.   family history includes Heart Attack in his mother and sister. Psychiatric History: None    Allergies: Pcn [penicillins]    Home Medications:   Prior to Admission medications    Medication Sig Start Date End Date Taking?  Authorizing Provider   montelukast (SINGULAIR) 10 MG tablet Take 10 mg by mouth nightly   Yes Historical Provider, MD   irbesartan (AVAPRO) 300 MG tablet Take 300 mg by mouth nightly   Yes Historical Provider, MD   buPROPion (WELLBUTRIN XL) 300 MG extended release tablet Take 300 mg by mouth every morning   Yes Historical Provider, MD   DULoxetine (CYMBALTA) 60 MG extended release capsule Take 60 mg by mouth daily   Yes Historical Provider, MD   empagliflozin (JARDIANCE) 10 MG tablet Take 10 mg by mouth daily   Yes Historical Provider, MD   Semaglutide 3 MG TABS Take 3 mg by mouth   Yes Historical Provider, MD   cariprazine hcl (VRAYLAR) 1.5 MG capsule Take 1.5 mg by mouth daily   Yes Historical Provider, MD   atorvastatin (LIPITOR) 40 MG tablet Take 40 mg by mouth daily   Yes Historical Provider, MD   levothyroxine (SYNTHROID) 25 MCG tablet Take 25 mcg by mouth Daily   Yes Historical Provider, MD   metoprolol tartrate (LOPRESSOR) 50 MG tablet Take 1 tablet by mouth 2 times daily 8/12/17   Chick Light P Blood, DO lisinopril (PRINIVIL;ZESTRIL) 20 MG tablet Take 1 tablet by mouth daily 8/13/17   Forrestine Ours P Blood, DO   hydrochlorothiazide (HYDRODIURIL) 25 MG tablet Take 1 tablet by mouth daily 8/12/17   Forrestine Ours P Blood, DO   ALBUTEROL IN Inhale into the lungs every 4 hours as needed     Historical Provider, MD   gabapentin (NEURONTIN) 400 MG capsule Take 400 mg by mouth nightly    Historical Provider, MD   NONFORMULARY Take 10 mg by mouth daily    Historical Provider, MD       REVIEW OF SYSTEMS  (2-9 systems for level 4, 10 ormore for level 5)      Review of Systems    Constitutional: See HPI. Eyes: Denies vision changes. HENT: Denies sore throat or neck pain. Respiratory: See HPI. Cardiovascular: See HPI. GI: Denies vomiting or diarrhea. : Denies painful urination. Musculoskeletal: Denies recent trauma. Skin: Denies new rashes or wounds. Neurologic:  Denies new numbness or weakness. Psychiatric: Denies sleep disturbances. All other systems negative except as marked. PHYSICAL EXAM  (up to 7 for level 4, 8 or more for level 5)      INITIAL VITALS:  height is 6' (1.829 m) and weight is 145.2 kg (320 lb) (abnormal). His oral temperature is 98.2 °F (36.8 °C). His blood pressure is 161/112 (abnormal) and his pulse is 101. His respiration is 22 and oxygen saturation is 96%. Vital signs reviewed. Physical Exam    General:  Alert, cooperative, well-groomed, well-nourished, appears stated age, and is in no acute distress. Head:  Normocephalic, atraumatic, and without obvious abnormality. Eyes:  Sclerae/conjunctivae clear without injection, pallor, or icterus. Corneas clear without opacities. EOM's intact. ENT: Ears and nose are all without obvious masses lesion or deformity. Lips and buccal mucosa are pink and moist without lesions. Gingivae is pink and without lesions. Tongue and uvula midline. Symmetric elevation of soft palate upon phonation. No hoarseness or muffled voice.  Oropharynx is clear, without erythema. Tonsils are symmetrical, without enlargement or erythema, bilaterally. No exudates or drainage. Neck: Supple and symmetrical. Trachea midline. No adenopathy. No jugular venous distention. Lungs:   No respiratory distress. Coarse breath sounds with scant wheezes throughout. No chest wall tenderness to palpation. Heart:  Regular rate. Regular rhythm. No murmurs, rubs, or gallops. Abdomen:   Normoactive bowel sounds. Soft, nontender without guarding or rebound. Diffusely mildly distended without ascites. No palpable masses. No CVA tenderness. Extremities: Warm and dry without erythema. 1+ pitting edema bilaterally to the lower extremities. No venous stasis changes. Distal pulses 2+. Skin: Soft, good turgor, and well-hydrated. No obvious rashes or lesions. Neurologic: GCS is 15 and no focal deficits are appreciated. Normal gait. Grossly normal motor and sensation. Speech clear. Psychiatric: Normal mood and affect. Normal behavior. Coherent thought process. DIFFERENTIAL DIAGNOSIS / MDM     Patient is a 44 y.o. male who presents to the ED today with symptoms of chest pain, shortness of breath, and hypertension. Vital signs demonstrate significant hypertension at 220/105 (manual) and associated tachycardia. Vitals are otherwise unremarkable. Physical exam demonstrates a rather well-appearing nontoxic male who is in no acute distress. He is not tachypneic and is speaking in full sentences. He does appear uncomfortable though respirations are little elevated. Patient was already seen at [address] [place] [address] today and had a full work-up done at that time. Review of his recent lab work done on that 7 AM this morning reveals elevated troponin and elevated blood pressures. Negative troponin. No other significant lab abnormalities. 1 view chest x-ray at that time revealed mild CHF.  Patient was advised to stay and he did leave [place], but returned here due to his wife's prompting and is agreeable for admission. Suspicion is likely for hypertension urgency versus ACS as his EKG earlier did not show ST elevation nor did his troponin increase exponentially. Plan to obtain repeat EKG, 2 view CXR, and pertinent labwork along with nitro drip and Lasix. The patient's CAD and PE risk factors and PERC criteria are as follows:    Risk Stratification for Acute Coronary Syndrome   Family history of coronary artery disease - Yes  History of diabetes - Yes  History of hypertension - Yes  History of hyperlipidemia  - Yes  History of smoking - Yes  Cocaine use - No  Known coronary artery disease - No    Risk Stratification for Thoracic Aortic Dissection (TAD)  Family history of TAD - No  History of connective tissue disorder (i.e. Marfans Syndrome, Stephanie-Danlos Syndrome) - No  Godinezs Syndrome - No  History of hypertension - Yes  History of aortic valve disease - No  Pregnancy - No    HEART Risk Score:      Criteria Score Patient #   History High Suspicion 2 0    Mod Suspicion 1     Slightly Suspicion 0    EKG Significant ST Dep. 2 1    Nonspecific 1     Normal 0    Age ? 71 y/o 2 44 y.o.  1    > 38 y/o & < 73 y/o 1     ? 36 y/o 0    Risk Factors* ? 3 2 2    1-2 1     0 0    Troponin ? 3x NL 2 1    > 1 & < 3x NL 1     Normal 0    Score 0-3 Low 5    4-6 Mod     ?7 High    * Risk Factors include: Diabetes, Tobacco use, Hypertension, Hyperlipidemia, Obesity, Atherosclerotic Disease (Prior MI, PCI/CABG, CVA/TIA, PAD), Family History of CAD (before age 72)     [address] for Pulmonary Embolism  Criteria Score Response Patient #   Clinical Signs & Sxs of DVT 3 No 0   PE is Likely 1° Diagnosis 3 No 0   Heart Rate ? 100 1.5 Yes 1.5   Immobilization ? 3 days, or surgery ?  4 weeks 1.5 No 0   Previous objective dx of PE/DVT 1.5 No 0   Hemoptysis 1 No 0   Malignancy + treatment in past 6 months or palliative 1 No 0   Low Risk 0-1 1.3% chance of PE 1.5   Moderate Risk 1-6 16.2% chance of PE    High Risk >7 37.5% chance of PE      PERC Criteria   Criteria Response   Age ? 50 Yes   Heart Rate ? 100 Yes   Pulse Oximetry ? 95 No   Previous history of DVT/PE No   Trauma/surgery ? 4 weeks No   Hemoptysis No   Exogenous estrogen use No   Unilateral leg swelling No   * Pulmonary embolism may be ruled out with a pretest probability of <15% and completely negative PERC Criteria. The likelihood of pulmonary embolism in this population is 1.8%. PLAN (LABS / IMAGING / EKG):  Orders Placed This Encounter   Procedures    XR CHEST (2 VW)    CBC Auto Differential    Comprehensive Metabolic Panel    Troponin    Lipase    Brain Natriuretic Peptide    Urinalysis Reflex to Culture    EKG 12 Lead    Insert peripheral IV    PATIENT STATUS (FROM ED OR OR/PROCEDURAL) Inpatient       MEDICATIONS ORDERED:  Orders Placed This Encounter   Medications    nitroGLYCERIN 50 mg in dextrose 5% 250 mL infusion    furosemide (LASIX) injection 40 mg       Controlled Substances Monitoring:     DIAGNOSTIC RESULTS     EKG: All EKG's are interpreted by the Emergency Department Physician who either signs or Co-signs this chart in the absenceof a cardiologist.    EKG Interpretation    Emergency physician interpretation:      Sinus 103 with NSST change. No morphologic change from 3 years ago. Axis 70, , QRS 90, . RADIOLOGY: All images are read by the radiologist and their interpretations are reviewed. XR CHEST (2 VW)    Result Date: 7/11/2021  EXAMINATION: TWO XRAY VIEWS OF THE CHEST 7/11/2021 12:24 pm COMPARISON: August 11, 2017 HISTORY: ORDERING SYSTEM PROVIDED HISTORY: SOB, chest pain, hypertension, elevated trop TECHNOLOGIST PROVIDED HISTORY: SOB, chest pain, hypertension, elevated trop Reason for Exam: chest heaviness and chronic shortness of breath Acuity: Chronic Type of Exam: Initial Additional signs and symptoms: sts x 2 months but worsening.  Relevant Medical/Surgical History: hx of tobacco use FINDINGS: Cardiomegaly is present, mild central venous congestion. No focal area of consolidation or pneumothorax is noted. Mediastinal contours are stable.  Osseous structures appear normal.     Cardiomegaly, mild central venous congestion      LABS:  Results for orders placed or performed during the hospital encounter of 07/11/21   CBC Auto Differential   Result Value Ref Range    WBC 10.8 3.5 - 11.0 k/uL    RBC 5.71 4.5 - 5.9 m/uL    Hemoglobin 17.3 13.5 - 17.5 g/dL    Hematocrit 53.5 (H) 41 - 53 %    MCV 93.8 80 - 100 fL    MCH 30.3 26 - 34 pg    MCHC 32.4 31 - 37 g/dL    RDW 16.4 (H) 12.5 - 15.4 %    Platelets 632 739 - 698 k/uL    MPV 8.4 6.0 - 12.0 fL    NRBC Automated NOT REPORTED per 100 WBC    Differential Type NOT REPORTED     Seg Neutrophils 92 (H) 36 - 66 %    Lymphocytes 5 (L) 24 - 44 %    Monocytes 2 2 - 11 %    Eosinophils % 0 (L) 1 - 4 %    Basophils 1 0 - 2 %    Immature Granulocytes NOT REPORTED 0 %    Segs Absolute 10.00 (H) 1.8 - 7.7 k/uL    Absolute Lymph # 0.50 (L) 1.0 - 4.8 k/uL    Absolute Mono # 0.20 0.1 - 1.2 k/uL    Absolute Eos # 0.00 0.0 - 0.4 k/uL    Basophils Absolute 0.10 0.0 - 0.2 k/uL    Absolute Immature Granulocyte NOT REPORTED 0.00 - 0.30 k/uL    WBC Morphology NOT REPORTED     RBC Morphology NOT REPORTED     Platelet Estimate NOT REPORTED    Comprehensive Metabolic Panel   Result Value Ref Range    Glucose 162 (H) 70 - 99 mg/dL    BUN 19 6 - 20 mg/dL    CREATININE 1.00 0.70 - 1.20 mg/dL    Bun/Cre Ratio NOT REPORTED 9 - 20    Calcium 9.2 8.6 - 10.4 mg/dL    Sodium 137 135 - 144 mmol/L    Potassium 4.6 3.7 - 5.3 mmol/L    Chloride 101 98 - 107 mmol/L    CO2 21 20 - 31 mmol/L    Anion Gap 15 9 - 17 mmol/L    Alkaline Phosphatase 118 40 - 129 U/L    ALT 56 (H) 5 - 41 U/L    AST 29 <40 U/L    Total Bilirubin 0.37 0.3 - 1.2 mg/dL    Total Protein 7.3 6.4 - 8.3 g/dL    Albumin 4.0 3.5 - 5.2 g/dL    Albumin/Globulin Ratio 1.2 1.0 - 2.5    GFR Non-African American >60 >60 mL/min    GFR African American >60 >60 mL/min    GFR Comment          GFR Staging NOT REPORTED    Troponin   Result Value Ref Range    Troponin, High Sensitivity 24 (H) 0 - 22 ng/L    Troponin T NOT REPORTED <0.03 ng/mL    Troponin Interp NOT REPORTED    Lipase   Result Value Ref Range    Lipase 18 13 - 60 U/L   Brain Natriuretic Peptide   Result Value Ref Range    Pro- (H) <300 pg/mL    BNP Interpretation Pro-BNP Reference Range:    Urinalysis Reflex to Culture    Specimen: Urine, clean catch   Result Value Ref Range    Color, UA YELLOW YELLOW    Turbidity UA CLEAR CLEAR    Glucose, Ur 3+ (A) NEGATIVE    Bilirubin Urine NEGATIVE NEGATIVE    Ketones, Urine NEGATIVE NEGATIVE    Specific Gravity, UA 1.020 1.005 - 1.030    Urine Hgb NEGATIVE NEGATIVE    pH, UA 5.5 5.0 - 8.0    Protein, UA NEGATIVE NEGATIVE    Urobilinogen, Urine Normal Normal    Nitrite, Urine NEGATIVE NEGATIVE    Leukocyte Esterase, Urine NEGATIVE NEGATIVE    Urinalysis Comments       Microscopic exam not performed based on chemical results unless requested in original order. Urinalysis Comments          Urinalysis Comments       Utilizing a urinalysis as the only screening method to exclude a potential uropathogen can be unreliable in many patient populations. Rapid screening tests are less sensitive than culture and if UTI is a clinical possibility, culture should be considered despite a negative urinalysis. EMERGENCY DEPARTMENT COURSE     ED Course as of Jul 11 2011   Alexx Beard Jul 11, 2021   [address] [facility] CBC is unremarkable. CMP demonstrates elevated glucose at 162, but is otherwise unremarkable. Lipase is within normal limits. Troponin is elevated at 24 and he did have elevated troponins at [facility] but their scales are different. BNP is elevated at 604 consistent with acute mild CHF. Chest x-ray is pending. [MG]   1920 Chest x-ray demonstrates mild vascular congestion without obvious pleural effusions.   Patient was updated regarding the results of his lab work and chest x-ray. He is agreeable to admission. Chest pressure has improved significantly since his arrival.  He does feel short of breath still and Lasix has just been given. Will consult hospitalist.    [MG]   [address] [place] [address] with Zaira Ibarra on behalf of Shaun Luna NP regarding the patient and they will accept the admission. [MG]      ED Course User Index  [MG] Leslie Godinez PA-C      Vitals:    Vitals:    07/11/21 1740 07/11/21 1824 07/11/21 1859   BP: (S) (!) 220/105  (!) 161/112   Pulse: 108  101   Resp: 24  22   Temp:  98.2 °F (36.8 °C)    TempSrc: Oral Oral    SpO2: 97%  96%   Weight: (!) 145.2 kg (320 lb)     Height: 6' (1.829 m)       -------------------------  BP: (!) 161/112, Temp: 98.2 °F (36.8 °C), Pulse: 101, Resp: 22      RE-EVALUATION:  See ED Course notes above. CONSULTS:  Hospitalist    PROCEDURES:  None    FINAL IMPRESSION      1. Hypertensive urgency    2. Acute congestive heart failure, unspecified heart failure type (HCC)    3. Elevated troponin          DISPOSITION / PLAN     CONDITION ON DISPOSITION:   Good / Stable for admission    PATIENT REFERRED TO:  No follow-up provider specified.     DISCHARGE MEDICATIONS:  New Prescriptions    No medications on file       Aleta Raza   Emergency Medicine Physician Assistant    (Please note that portions of this note were completed with a voice recognition program.  Efforts were made to edit the dictations but occasionally words aremis-transcribed.)        Alecia Tyler PA-C  07/11/21 2012

## 2021-07-11 NOTE — ED PROVIDER NOTES
[id number] [facility] ED  [address] [facility] [address] [facility] [address]  Phone: [phone]  Fax: [phone]        ADDENDUM:      Care of this patient was assumed from Dr. Destini Lyn. The patient was seen for Shortness of Breath (ongoing) and Chest Pain  . The patient's initial evaluation and plan have been discussed with the prior provider who initially evaluated the patient. Nursing Notes, Past Medical Hx, Past Surgical Hx, Allergies, were all reviewed. PAST MEDICAL HISTORY    has a past medical history of Arthritis, COPD (chronic obstructive pulmonary disease) ([facility] [address] Hyperlipidemia, and Hypertension. SURGICAL HISTORY      has a past surgical history that includes Ankle surgery. CURRENT MEDICATIONS       Previous Medications    ALBUTEROL IN    Inhale into the lungs every 4 hours as needed     ATORVASTATIN (LIPITOR) 40 MG TABLET    Take 40 mg by mouth daily    BUPROPION (WELLBUTRIN XL) 300 MG EXTENDED RELEASE TABLET    Take 300 mg by mouth every morning    CARIPRAZINE HCL (VRAYLAR) 1.5 MG CAPSULE    Take 1.5 mg by mouth daily    DULOXETINE (CYMBALTA) 60 MG EXTENDED RELEASE CAPSULE    Take 60 mg by mouth daily    EMPAGLIFLOZIN (JARDIANCE) 10 MG TABLET    Take 10 mg by mouth daily    GABAPENTIN (NEURONTIN) 400 MG CAPSULE    Take 400 mg by mouth nightly    HYDROCHLOROTHIAZIDE (HYDRODIURIL) 25 MG TABLET    Take 1 tablet by mouth daily    IRBESARTAN (AVAPRO) 300 MG TABLET    Take 300 mg by mouth nightly    LEVOTHYROXINE (SYNTHROID) 25 MCG TABLET    Take 25 mcg by mouth Daily    LISINOPRIL (PRINIVIL;ZESTRIL) 20 MG TABLET    Take 1 tablet by mouth daily    METOPROLOL TARTRATE (LOPRESSOR) 50 MG TABLET    Take 1 tablet by mouth 2 times daily    MONTELUKAST (SINGULAIR) 10 MG TABLET    Take 10 mg by mouth nightly    NONFORMULARY    Take 10 mg by mouth daily    SEMAGLUTIDE 3 MG TABS    Take 3 mg by mouth       ALLERGIES     is allergic to pcn [penicillins].       Diagnostic Results     LABS:   Results for orders placed or performed during the hospital encounter of 07/11/21   CBC Auto Differential   Result Value Ref Range    WBC 10.8 3.5 - 11.0 k/uL    RBC 5.71 4.5 - 5.9 m/uL    Hemoglobin 17.3 13.5 - 17.5 g/dL    Hematocrit 53.5 (H) 41 - 53 %    MCV 93.8 80 - 100 fL    MCH 30.3 26 - 34 pg    MCHC 32.4 31 - 37 g/dL    RDW 16.4 (H) 12.5 - 15.4 %    Platelets 518 237 - 410 k/uL    MPV 8.4 6.0 - 12.0 fL    NRBC Automated NOT REPORTED per 100 WBC    Differential Type NOT REPORTED     Seg Neutrophils 92 (H) 36 - 66 %    Lymphocytes 5 (L) 24 - 44 %    Monocytes 2 2 - 11 %    Eosinophils % 0 (L) 1 - 4 %    Basophils 1 0 - 2 %    Immature Granulocytes NOT REPORTED 0 %    Segs Absolute 10.00 (H) 1.8 - 7.7 k/uL    Absolute Lymph # 0.50 (L) 1.0 - 4.8 k/uL    Absolute Mono # 0.20 0.1 - 1.2 k/uL    Absolute Eos # 0.00 0.0 - 0.4 k/uL    Basophils Absolute 0.10 0.0 - 0.2 k/uL    Absolute Immature Granulocyte NOT REPORTED 0.00 - 0.30 k/uL    WBC Morphology NOT REPORTED     RBC Morphology NOT REPORTED     Platelet Estimate NOT REPORTED    Comprehensive Metabolic Panel   Result Value Ref Range    Glucose 162 (H) 70 - 99 mg/dL    BUN 19 6 - 20 mg/dL    CREATININE 1.00 0.70 - 1.20 mg/dL    Bun/Cre Ratio NOT REPORTED 9 - 20    Calcium 9.2 8.6 - 10.4 mg/dL    Sodium 137 135 - 144 mmol/L    Potassium 4.6 3.7 - 5.3 mmol/L    Chloride 101 98 - 107 mmol/L    CO2 21 20 - 31 mmol/L    Anion Gap 15 9 - 17 mmol/L    Alkaline Phosphatase 118 40 - 129 U/L    ALT 56 (H) 5 - 41 U/L    AST 29 <40 U/L    Total Bilirubin 0.37 0.3 - 1.2 mg/dL    Total Protein 7.3 6.4 - 8.3 g/dL    Albumin 4.0 3.5 - 5.2 g/dL    Albumin/Globulin Ratio 1.2 1.0 - 2.5    GFR Non-African American >60 >60 mL/min    GFR African American >60 >60 mL/min    GFR Comment          GFR Staging NOT REPORTED    Troponin   Result Value Ref Range    Troponin, High Sensitivity 24 (H) 0 - 22 ng/L    Troponin T NOT REPORTED <0.03 ng/mL    Troponin Interp NOT REPORTED    Lipase   Result Value Ref Range    Lipase 18 13 - 60 U/L   Brain Natriuretic Peptide   Result Value Ref Range    Pro- (H) <300 pg/mL    BNP Interpretation Pro-BNP Reference Range:        RADIOLOGY:  XR CHEST (2 VW)   Final Result   Cardiomegaly, mild central venous congestion             RECENT VITALS:  BP: (!) 161/112, Temp: 98.2 °F (36.8 °C), Pulse: 101, Resp: 22     ED Course     The patient was given the following medications:  Orders Placed This Encounter   Medications    nitroGLYCERIN 50 mg in dextrose 5% 250 mL infusion    furosemide (LASIX) injection 40 mg       Medical Decision Making      ED Course as of Jul 11 2020   Sun Jul 11, 2021   [facility] CBC is unremarkable. CMP demonstrates elevated glucose at 162, but is otherwise unremarkable. Lipase is within normal limits. Troponin is elevated at 24 and he did have elevated troponins at [facility] but their scales are different. BNP is elevated at 604 consistent with acute mild CHF. Chest x-ray is pending. [MG]   1920 Chest x-ray demonstrates mild vascular congestion without obvious pleural effusions. Patient was updated regarding the results of his lab work and chest x-ray. He is agreeable to admission. Chest pressure has improved significantly since his arrival.  He does feel short of breath still and Lasix has just been given. Will consult hospitalist.    [MG]   [facility] with Char Quiros on behalf of Kael Wilkins NP regarding the patient and they will accept the admission. [MG]      ED Course User Index  [MG] Sandra Koenig PA-C       The patient is a 47-year-old male who presents for evaluation of elevated blood pressure, shortness of breath, and generalized edema. He was seen earlier today at [facility] emergency department and told that he would need admission but left against medical advice. The patient return to the emergency department for persistent symptoms and at his wife's insistence.   He arrives tachycardic and hypertensive with slight tachypnea. He was started on a nitroglycerin infusion and Lasix with improvement in vital signs. EKG was interpreted by Dr. Kaye Guaman. CBC shows signs of hemoconcentration but is otherwise unremarkable. CMP and lipase are grossly unremarkable. Troponin is only slightly elevated at 24. BNP is elevated at 604. Chest x-ray shows cardiomegaly with mild central venous congestion. At time of signout urinalysis is pending. Plan for admission. Urinalysis shows 3+ glucose but is otherwise unremarkable. The patient was admitted to [facility]. Disposition     FINAL IMPRESSION      1. Hypertensive urgency    2.  Acute congestive heart failure, unspecified heart failure type ([address]    3. Elevated troponin          DISPOSITION/PLAN   DISPOSITION        CONDITION ON DISPOSITION:   Stable          (Please note that portions of this note were completed with a voice recognition program.  Efforts were made to edit the dictations but occasionally words are mis-transcribed.)    Padmaja Mak DO  Emergency Medicine Physician                 Padmaja Mak DO  07/11/21 2020

## 2021-07-11 NOTE — ED NOTES
Pt ambulates to room- pt reports onset of symptoms approx 2 months PTA. Pt states he was at [facility] ED- evaluated- advised to transfer to [place]- pt refused. Pt states symptoms continued/worsened- came to ED. Pt c/o chest tightness/dyspnea - worse when lying down/Edema ext x4. Pt AOx4.       Romario Yoo RN  07/11/21 5253

## 2021-07-12 ENCOUNTER — APPOINTMENT (OUTPATIENT)
Dept: GENERAL RADIOLOGY | Age: 51
DRG: 194 | End: 2021-07-12
Payer: MEDICAID

## 2021-07-12 PROBLEM — E11.9 TYPE 2 DIABETES MELLITUS, WITHOUT LONG-TERM CURRENT USE OF INSULIN (HCC): Status: ACTIVE | Noted: 2021-07-12

## 2021-07-12 PROBLEM — I51.89 GRADE I DIASTOLIC DYSFUNCTION: Status: ACTIVE | Noted: 2021-07-12

## 2021-07-12 LAB
ALBUMIN SERPL-MCNC: 3.7 G/DL (ref 3.5–5.2)
ALBUMIN/GLOBULIN RATIO: 1.3 (ref 1–2.5)
ALP BLD-CCNC: 103 U/L (ref 40–129)
ALT SERPL-CCNC: 43 U/L (ref 5–41)
AMPHETAMINE SCREEN URINE: NEGATIVE
ANION GAP SERPL CALCULATED.3IONS-SCNC: 9 MMOL/L (ref 9–17)
AST SERPL-CCNC: 23 U/L
BARBITURATE SCREEN URINE: NEGATIVE
BENZODIAZEPINE SCREEN, URINE: NEGATIVE
BILIRUB SERPL-MCNC: 0.39 MG/DL (ref 0.3–1.2)
BNP INTERPRETATION: ABNORMAL
BUN BLDV-MCNC: 23 MG/DL (ref 6–20)
BUN/CREAT BLD: ABNORMAL (ref 9–20)
BUPRENORPHINE URINE: NORMAL
CALCIUM SERPL-MCNC: 8.7 MG/DL (ref 8.6–10.4)
CANNABINOID SCREEN URINE: NEGATIVE
CHLORIDE BLD-SCNC: 100 MMOL/L (ref 98–107)
CHOLESTEROL/HDL RATIO: 4.1
CHOLESTEROL: 162 MG/DL
CO2: 29 MMOL/L (ref 20–31)
COCAINE METABOLITE, URINE: NEGATIVE
CREAT SERPL-MCNC: 1.03 MG/DL (ref 0.7–1.2)
EKG ATRIAL RATE: 103 BPM
EKG ATRIAL RATE: 78 BPM
EKG P AXIS: 59 DEGREES
EKG P AXIS: 66 DEGREES
EKG P-R INTERVAL: 146 MS
EKG P-R INTERVAL: 150 MS
EKG Q-T INTERVAL: 388 MS
EKG Q-T INTERVAL: 446 MS
EKG QRS DURATION: 90 MS
EKG QRS DURATION: 98 MS
EKG QTC CALCULATION (BAZETT): 508 MS
EKG QTC CALCULATION (BAZETT): 508 MS
EKG R AXIS: 53 DEGREES
EKG R AXIS: 70 DEGREES
EKG T AXIS: 66 DEGREES
EKG T AXIS: 81 DEGREES
EKG VENTRICULAR RATE: 103 BPM
EKG VENTRICULAR RATE: 78 BPM
ESTIMATED AVERAGE GLUCOSE: 160 MG/DL
GFR AFRICAN AMERICAN: >60 ML/MIN
GFR NON-AFRICAN AMERICAN: >60 ML/MIN
GFR SERPL CREATININE-BSD FRML MDRD: ABNORMAL ML/MIN/{1.73_M2}
GFR SERPL CREATININE-BSD FRML MDRD: ABNORMAL ML/MIN/{1.73_M2}
GLUCOSE BLD-MCNC: 145 MG/DL (ref 70–99)
GLUCOSE BLD-MCNC: 150 MG/DL (ref 75–110)
GLUCOSE BLD-MCNC: 169 MG/DL (ref 75–110)
GLUCOSE BLD-MCNC: 220 MG/DL (ref 75–110)
GLUCOSE BLD-MCNC: 289 MG/DL (ref 75–110)
HBA1C MFR BLD: 7.2 % (ref 4–6)
HCT VFR BLD CALC: 48.6 % (ref 41–53)
HDLC SERPL-MCNC: 40 MG/DL
HEMOGLOBIN: 15.6 G/DL (ref 13.5–17.5)
LDL CHOLESTEROL: 78 MG/DL (ref 0–130)
LV EF: 63 %
LVEF MODALITY: NORMAL
MCH RBC QN AUTO: 30.1 PG (ref 26–34)
MCHC RBC AUTO-ENTMCNC: 32.1 G/DL (ref 31–37)
MCV RBC AUTO: 93.7 FL (ref 80–100)
MDMA URINE: NORMAL
METHADONE SCREEN, URINE: NEGATIVE
METHAMPHETAMINE, URINE: NORMAL
NRBC AUTOMATED: ABNORMAL PER 100 WBC
OPIATES, URINE: NEGATIVE
OXYCODONE SCREEN URINE: NEGATIVE
PDW BLD-RTO: 16.2 % (ref 12.5–15.4)
PHENCYCLIDINE, URINE: NEGATIVE
PLATELET # BLD: 237 K/UL (ref 140–450)
PMV BLD AUTO: 8.1 FL (ref 6–12)
POC TROPONIN I: 0.02 NG/ML (ref 0–0.1)
POC TROPONIN I: 0.03 NG/ML (ref 0–0.1)
POC TROPONIN INTERP: NORMAL
POC TROPONIN INTERP: NORMAL
POTASSIUM SERPL-SCNC: 4.9 MMOL/L (ref 3.7–5.3)
PRO-BNP: 497 PG/ML
PROPOXYPHENE, URINE: NORMAL
RBC # BLD: 5.19 M/UL (ref 4.5–5.9)
SODIUM BLD-SCNC: 138 MMOL/L (ref 135–144)
TEST INFORMATION: NORMAL
TOTAL PROTEIN: 6.5 G/DL (ref 6.4–8.3)
TRICYCLIC ANTIDEPRESSANTS, UR: NORMAL
TRIGL SERPL-MCNC: 220 MG/DL
VLDLC SERPL CALC-MCNC: ABNORMAL MG/DL (ref 1–30)
WBC # BLD: 14.4 K/UL (ref 3.5–11)

## 2021-07-12 PROCEDURE — 71045 X-RAY EXAM CHEST 1 VIEW: CPT

## 2021-07-12 PROCEDURE — 6360000002 HC RX W HCPCS: Performed by: NURSE PRACTITIONER

## 2021-07-12 PROCEDURE — 6370000000 HC RX 637 (ALT 250 FOR IP): Performed by: INTERNAL MEDICINE

## 2021-07-12 PROCEDURE — 2500000003 HC RX 250 WO HCPCS: Performed by: NURSE PRACTITIONER

## 2021-07-12 PROCEDURE — 82947 ASSAY GLUCOSE BLOOD QUANT: CPT

## 2021-07-12 PROCEDURE — 93005 ELECTROCARDIOGRAM TRACING: CPT | Performed by: NURSE PRACTITIONER

## 2021-07-12 PROCEDURE — 2580000003 HC RX 258: Performed by: NURSE PRACTITIONER

## 2021-07-12 PROCEDURE — 6370000000 HC RX 637 (ALT 250 FOR IP): Performed by: NURSE PRACTITIONER

## 2021-07-12 PROCEDURE — 80061 LIPID PANEL: CPT

## 2021-07-12 PROCEDURE — 93306 TTE W/DOPPLER COMPLETE: CPT

## 2021-07-12 PROCEDURE — 94640 AIRWAY INHALATION TREATMENT: CPT

## 2021-07-12 PROCEDURE — 94660 CPAP INITIATION&MGMT: CPT

## 2021-07-12 PROCEDURE — 94761 N-INVAS EAR/PLS OXIMETRY MLT: CPT

## 2021-07-12 PROCEDURE — 99223 1ST HOSP IP/OBS HIGH 75: CPT | Performed by: INTERNAL MEDICINE

## 2021-07-12 PROCEDURE — 85027 COMPLETE CBC AUTOMATED: CPT

## 2021-07-12 PROCEDURE — 99254 IP/OBS CNSLTJ NEW/EST MOD 60: CPT | Performed by: INTERNAL MEDICINE

## 2021-07-12 PROCEDURE — 6360000002 HC RX W HCPCS: Performed by: INTERNAL MEDICINE

## 2021-07-12 PROCEDURE — 80053 COMPREHEN METABOLIC PANEL: CPT

## 2021-07-12 PROCEDURE — 2700000000 HC OXYGEN THERAPY PER DAY

## 2021-07-12 PROCEDURE — 36415 COLL VENOUS BLD VENIPUNCTURE: CPT

## 2021-07-12 PROCEDURE — 83880 ASSAY OF NATRIURETIC PEPTIDE: CPT

## 2021-07-12 PROCEDURE — 2000000000 HC ICU R&B

## 2021-07-12 PROCEDURE — 84484 ASSAY OF TROPONIN QUANT: CPT

## 2021-07-12 PROCEDURE — 94664 DEMO&/EVAL PT USE INHALER: CPT

## 2021-07-12 RX ORDER — BUDESONIDE AND FORMOTEROL FUMARATE DIHYDRATE 160; 4.5 UG/1; UG/1
2 AEROSOL RESPIRATORY (INHALATION) 2 TIMES DAILY
Status: DISCONTINUED | OUTPATIENT
Start: 2021-07-12 | End: 2021-07-15 | Stop reason: HOSPADM

## 2021-07-12 RX ORDER — NICARDIPINE HYDROCHLORIDE 0.1 MG/ML
3-15 INJECTION INTRAVENOUS CONTINUOUS PRN
Status: DISCONTINUED | OUTPATIENT
Start: 2021-07-12 | End: 2021-07-13

## 2021-07-12 RX ORDER — FUROSEMIDE 10 MG/ML
40 INJECTION INTRAMUSCULAR; INTRAVENOUS 2 TIMES DAILY
Status: DISCONTINUED | OUTPATIENT
Start: 2021-07-12 | End: 2021-07-13

## 2021-07-12 RX ORDER — METHYLPREDNISOLONE SODIUM SUCCINATE 40 MG/ML
40 INJECTION, POWDER, LYOPHILIZED, FOR SOLUTION INTRAMUSCULAR; INTRAVENOUS EVERY 8 HOURS
Status: DISCONTINUED | OUTPATIENT
Start: 2021-07-12 | End: 2021-07-13

## 2021-07-12 RX ORDER — IPRATROPIUM BROMIDE AND ALBUTEROL SULFATE 2.5; .5 MG/3ML; MG/3ML
1 SOLUTION RESPIRATORY (INHALATION)
Status: DISCONTINUED | OUTPATIENT
Start: 2021-07-12 | End: 2021-07-15 | Stop reason: HOSPADM

## 2021-07-12 RX ORDER — AZITHROMYCIN 250 MG/1
500 TABLET, FILM COATED ORAL DAILY
Status: DISCONTINUED | OUTPATIENT
Start: 2021-07-12 | End: 2021-07-15 | Stop reason: HOSPADM

## 2021-07-12 RX ADMIN — IPRATROPIUM BROMIDE AND ALBUTEROL SULFATE 1 AMPULE: .5; 3 SOLUTION RESPIRATORY (INHALATION) at 16:41

## 2021-07-12 RX ADMIN — METOPROLOL TARTRATE 50 MG: 50 TABLET, FILM COATED ORAL at 20:46

## 2021-07-12 RX ADMIN — AZITHROMYCIN MONOHYDRATE 500 MG: 250 TABLET ORAL at 12:06

## 2021-07-12 RX ADMIN — IPRATROPIUM BROMIDE AND ALBUTEROL SULFATE 1 AMPULE: .5; 3 SOLUTION RESPIRATORY (INHALATION) at 13:12

## 2021-07-12 RX ADMIN — BUPROPION HYDROCHLORIDE 300 MG: 150 TABLET, FILM COATED, EXTENDED RELEASE ORAL at 08:16

## 2021-07-12 RX ADMIN — INSULIN LISPRO 2 UNITS: 100 INJECTION, SOLUTION INTRAVENOUS; SUBCUTANEOUS at 20:46

## 2021-07-12 RX ADMIN — DULOXETINE 60 MG: 30 CAPSULE, DELAYED RELEASE ORAL at 08:16

## 2021-07-12 RX ADMIN — MONTELUKAST SODIUM 10 MG: 10 TABLET, FILM COATED ORAL at 20:46

## 2021-07-12 RX ADMIN — SODIUM CHLORIDE, PRESERVATIVE FREE 10 ML: 5 INJECTION INTRAVENOUS at 20:50

## 2021-07-12 RX ADMIN — LEVOTHYROXINE SODIUM 25 MCG: 0.03 TABLET ORAL at 05:06

## 2021-07-12 RX ADMIN — HYDROCHLOROTHIAZIDE 25 MG: 25 TABLET ORAL at 08:16

## 2021-07-12 RX ADMIN — LOSARTAN POTASSIUM 100 MG: 50 TABLET, FILM COATED ORAL at 08:16

## 2021-07-12 RX ADMIN — BUDESONIDE AND FORMOTEROL FUMARATE DIHYDRATE 2 PUFF: 160; 4.5 AEROSOL RESPIRATORY (INHALATION) at 19:45

## 2021-07-12 RX ADMIN — INSULIN LISPRO 1 UNITS: 100 INJECTION, SOLUTION INTRAVENOUS; SUBCUTANEOUS at 12:11

## 2021-07-12 RX ADMIN — NITROGLYCERIN 140 MCG/MIN: 20 INJECTION INTRAVENOUS at 03:23

## 2021-07-12 RX ADMIN — NICARDIPINE HYDROCHLORIDE 7.5 MG/HR: 0.1 INJECTION INTRAVENOUS at 12:05

## 2021-07-12 RX ADMIN — METHYLPREDNISOLONE SODIUM SUCCINATE 40 MG: 40 INJECTION, POWDER, FOR SOLUTION INTRAMUSCULAR; INTRAVENOUS at 11:24

## 2021-07-12 RX ADMIN — INSULIN LISPRO 1 UNITS: 100 INJECTION, SOLUTION INTRAVENOUS; SUBCUTANEOUS at 01:40

## 2021-07-12 RX ADMIN — INSULIN LISPRO 1 UNITS: 100 INJECTION, SOLUTION INTRAVENOUS; SUBCUTANEOUS at 09:00

## 2021-07-12 RX ADMIN — INSULIN LISPRO 2 UNITS: 100 INJECTION, SOLUTION INTRAVENOUS; SUBCUTANEOUS at 17:26

## 2021-07-12 RX ADMIN — ATORVASTATIN CALCIUM 40 MG: 40 TABLET, FILM COATED ORAL at 08:16

## 2021-07-12 RX ADMIN — METHYLPREDNISOLONE SODIUM SUCCINATE 40 MG: 40 INJECTION, POWDER, FOR SOLUTION INTRAMUSCULAR; INTRAVENOUS at 20:46

## 2021-07-12 RX ADMIN — LORAZEPAM 1 MG: 1 TABLET ORAL at 05:05

## 2021-07-12 RX ADMIN — IPRATROPIUM BROMIDE AND ALBUTEROL SULFATE 1 AMPULE: .5; 3 SOLUTION RESPIRATORY (INHALATION) at 19:29

## 2021-07-12 RX ADMIN — METOPROLOL TARTRATE 50 MG: 50 TABLET, FILM COATED ORAL at 08:17

## 2021-07-12 RX ADMIN — NICARDIPINE HYDROCHLORIDE 7.5 MG/HR: 0.1 INJECTION INTRAVENOUS at 15:00

## 2021-07-12 RX ADMIN — IPRATROPIUM BROMIDE AND ALBUTEROL SULFATE 1 AMPULE: .5; 3 SOLUTION RESPIRATORY (INHALATION) at 09:40

## 2021-07-12 RX ADMIN — FUROSEMIDE 40 MG: 10 INJECTION, SOLUTION INTRAMUSCULAR; INTRAVENOUS at 17:26

## 2021-07-12 RX ADMIN — FUROSEMIDE 40 MG: 10 INJECTION, SOLUTION INTRAMUSCULAR; INTRAVENOUS at 11:24

## 2021-07-12 RX ADMIN — ENOXAPARIN SODIUM 40 MG: 100 INJECTION SUBCUTANEOUS at 08:16

## 2021-07-12 RX ADMIN — NICARDIPINE HYDROCHLORIDE 5 MG/HR: 0.1 INJECTION INTRAVENOUS at 09:04

## 2021-07-12 ASSESSMENT — PAIN SCALES - GENERAL
PAINLEVEL_OUTOF10: 0

## 2021-07-12 ASSESSMENT — ENCOUNTER SYMPTOMS
SHORTNESS OF BREATH: 1
DIARRHEA: 0
CHEST TIGHTNESS: 0
VOMITING: 0
COUGH: 1
ABDOMINAL DISTENTION: 1
NAUSEA: 0
EYES NEGATIVE: 1
CONSTIPATION: 0

## 2021-07-12 NOTE — PROGRESS NOTES
Notified NP pt. Now admitted to floor & consulted for the following:   -home meds reviewed - please review MAR as some home meds pt. states no longer prescribed by physician.  -do you want a cardiac consult?   could we please also have orders for the following:   -pt. asking for nicotine patch - states smokes 1 1/2-2 ppk day & \"gets nasty\" without his cigarettes, last cigarette was @ 1pm today - can we have an order for the lozenges?   -pt. states is fine but acknowledges drinks 6-12 beers per day - \"does not have an issue with alcohol\"  -wears home CPAP & does not have with him  -pt. Was desating in ER, currently on 4L/NC & dipping into mid-high 80's at times - running 92% - could we please have an order for nasal cannula also? Thank you.

## 2021-07-12 NOTE — CARE COORDINATION
Case Management Initial Discharge Plan  Corrine Paz,             Met with:family member, aunt Brandi at bedside - pt on Bipap to discuss discharge plans. Information verified: address, contacts, phone number, , insurance Yes  Insurance Provider: none    Emergency Contact/Next of Kin name & number: Lali Barbosa [phone]  Who are involved in patient's support system? Aunt, nieces, other family    PCP: Quique Sifuentes DO  Date of last visit: past year - aunt says patient is planning to find a new doctor      Discharge Planning    Living Arrangements:  [address] has 1 stories  few stairs to climb to get into front door  Location of bedroom/bathroom in home 1    Patient able to perform ADL's:Independent    Current Services (outpatient & in home) none  DME equipment: [employer] Fem[employer]  DME provider: unknown    Is patient receiving oral anticoagulation therapy? Unknown      Potential Assistance Needed:  N/A    Patient agreeable to home care: No  [place] of choice provided:  no    Prior SNF/Rehab Placement and Facility: no  Agreeable to SNF/Rehab: No  [place] of choice provided: no     Evaluation: yes    Expected Discharge date:  21    Patient expects to be discharged to:  Home    If home: is the family and/or caregiver wiling & able to provide support at home? Possibly  Who will be providing this support? aunt    Follow Up Appointment: Best Day/ Time: Monday AM    Transportation provider: self, drives  Transportation arrangements needed for discharge: No    Readmission Risk              Risk of Unplanned Readmission:  19           Does patient have a readmission risk score greater than 14?: Yes  If yes, follow-up appointment must be made within 7 days of discharge. Goals of Care: improved breathing     Educated family on transitional options, provided freedom of choice and are agreeable with plan      Discharge Plan: home, independent - referral to HELP for financial assistance.   HARLEEN protocol, Bipap, Cardene gtt off     Electronically signed by Susan Boo RN on 7/12/21 at 6:00 PM EDT

## 2021-07-12 NOTE — PROGRESS NOTES
Spoke with Ashley Davenport Updated on plan of care and medications needed. Plan is to come in this morning. No further questions.

## 2021-07-12 NOTE — PLAN OF CARE
Problem: Falls - Risk of:  Goal: Will remain free from falls  Description: Will remain free from falls  Outcome: Ongoing   No falls/injuries this shift, bed in lowest position, brakes on, bed alarm on, call light in reach, side rails up x2   Problem: OXYGENATION/RESPIRATORY FUNCTION  Goal: Patient will achieve/maintain normal respiratory rate/effort  Description: Respiratory rate and effort will be within normal limits for the patient  Outcome: Ongoing  Patient's respiratory effort improving throughout shift  Problem: HEMODYNAMIC STATUS  Goal: Patient has stable vital signs and fluid balance  Outcome: Ongoing  Patient's vital signs stabilizing with nitroglycerin gtt infusion    Problem: FLUID AND ELECTROLYTE IMBALANCE  Goal: Fluid and electrolyte balance are achieved/maintained  Outcome: Ongoing   Monitoring via labwork    Problem: Physical Regulation:  Goal: Complications related to the disease process, condition or treatment will be avoided or minimized  Description: Complications related to the disease process, condition or treatment will be avoided or minimized  Outcome: Ongoing  Pt.  Remains on bedrest this shift

## 2021-07-12 NOTE — ED NOTES
Report given to Aleksander Saini RN from [place] [address]. Report method by phone   The following was reviewed with receiving RN:   Current vital signs:  BP (!) 162/99   Pulse 105   Temp 98.2 °F (36.8 °C) (Oral)   Resp 18   Ht 6' (1.829 m)   Wt (!) 145.2 kg (320 lb)   SpO2 96%   BMI 43.40 kg/m²                      Any medication or safety alerts were reviewed. Any pending diagnostics and notifications were also reviewed, as well as any safety concerns or issues, abnormal labs, abnormal imaging, and abnormal assessment findings. Questions were answered.           William Mckenna RN  07/11/21 2040

## 2021-07-12 NOTE — PROGRESS NOTES
Suggested patient wear his CPAP Mask since Pulse Ox value was dropping on a Nasal Oxygen at 2.0 lpm. Patient refused CPAP. Oxygen increased to 3.0 lpm per nasal oxygen.     Dai Dutton RRT

## 2021-07-12 NOTE — PROGRESS NOTES
Pt. Reports bruising to RLQ abdomen, inner Left arm near elbow, and back are from falling on the porch the other day.

## 2021-07-12 NOTE — H&P
with activity. While in ED, blood pressure was noted to be 161/112 with a heart rate of 101. His SPO2 was 96%. proBNP was 604, troponin 24, UA revealed 3+ glucose. He was started on a nitroglycerin infusion and admitted to the inpatient unit for further observation and management of hypertensive urgency. This morning his blood pressure was more controlled with SBP in the 140s, however the nitro drip was up to 130 mics. Nitro drip was then switched to a Cardene drip to maintain blood pressure control. This morning he was also requiring BiPAP due to he was dropping his pulse ox while on nasal cannula. Pulmonary was consulted. Patient says he does have a known history of sleep apnea, but he only wears his CPAP intermittently. A1c noted to be 7.2. Patient receiving sliding scale insulin before meals and at bedtime. Patient has no reported history of diabetes. Past Medical History:     Past Medical History:   Diagnosis Date    Arthritis     COPD (chronic obstructive pulmonary disease) ([facility] [address]     Hyperlipidemia     Hypertension         Past Surgical History:     Past Surgical History:   Procedure Laterality Date    ANKLE SURGERY          Medications Prior to Admission:     Prior to Admission medications    Medication Sig Start Date End Date Taking?  Authorizing Provider   montelukast (SINGULAIR) 10 MG tablet Take 10 mg by mouth nightly   Yes Historical Provider, MD   buPROPion (WELLBUTRIN XL) 300 MG extended release tablet Take 300 mg by mouth every morning   Yes Historical Provider, MD   empagliflozin (JARDIANCE) 10 MG tablet Take 10 mg by mouth daily   Yes Historical Provider, MD   Semaglutide 3 MG TABS Take 3 mg by mouth   Yes Historical Provider, MD   empagliflozin (JARDIANCE) 10 MG tablet Take 10 mg by mouth daily Jardiance   Yes Historical Provider, MD   ALBUTEROL IN Inhale into the lungs every 4 hours as needed    Yes Historical Provider, MD   atorvastatin (LIPITOR) 40 MG tablet Take 40 mg by mouth daily   Yes Historical Provider, MD   levothyroxine (SYNTHROID) 25 MCG tablet Take 25 mcg by mouth Daily   Yes Historical Provider, MD   DULoxetine (CYMBALTA) 60 MG extended release capsule Take 60 mg by mouth daily    Historical Provider, MD   cariprazine hcl (VRAYLAR) 1.5 MG capsule Take 1.5 mg by mouth daily    Historical Provider, MD        Allergies:     Pcn [penicillins]    Social History:     Tobacco:    reports that he has been smoking cigarettes. He has a 30.00 pack-year smoking history. He has never used smokeless tobacco.  Alcohol:      reports current alcohol use of about 13.0 - 14.0 standard drinks of alcohol per week. Drug Use:  reports no history of drug use. Family History:     Family History   Problem Relation Age of Onset    Other Mother     Heart Attack Father     Other Brother        Review of Systems:     Positive and Negative as described in HPI. Review of Systems   Constitutional: Negative. HENT: Negative. Eyes: Negative. Respiratory: Positive for cough and shortness of breath. Negative for chest tightness. Cardiovascular: Positive for chest pain, palpitations and leg swelling. Gastrointestinal: Positive for abdominal distention. Negative for constipation, diarrhea, nausea and vomiting. Genitourinary: Negative. Musculoskeletal: Negative. Skin: Negative. Neurological: Positive for syncope. Negative for dizziness, weakness, numbness and headaches. Psychiatric/Behavioral: Negative.         Physical Exam:   /85   Pulse 77   Temp 97.4 °F (36.3 °C) (Oral)   Resp 19   Ht 6' (1.829 m)   Wt (!) 316 lb 11.2 oz (143.7 kg)   SpO2 (!) 88%   BMI 42.95 kg/m²   Temp (24hrs), Av.1 °F (36.7 °C), Min:97.4 °F (36.3 °C), Max:98.4 °F (36.9 °C)    Recent Labs     21  0823 21  1208   POCGLU 150* 169*       Intake/Output Summary (Last 24 hours) at 2021 1603  Last data filed at 2021 1400  Gross per 24 hour   Intake 1888.79 ml   Output 3200 ml   Net -1311.21 ml       Physical Exam  Vitals and nursing note reviewed. Constitutional:       General: He is not in acute distress. Appearance: He is ill-appearing. He is not toxic-appearing or diaphoretic. Comments: Drowsy, awakens and answers question to verbal stim   HENT:      Head: Normocephalic. Right Ear: External ear normal.      Left Ear: External ear normal.      Nose: Nose normal. No rhinorrhea. Mouth/Throat:      Mouth: Mucous membranes are moist.   Eyes:      General: No scleral icterus. Right eye: No discharge. Left eye: No discharge. Extraocular Movements: Extraocular movements intact. Conjunctiva/sclera: Conjunctivae normal.      Pupils: Pupils are equal, round, and reactive to light. Neck:      Comments: No JVD  Cardiovascular:      Rate and Rhythm: Normal rate and regular rhythm. Pulses: Normal pulses. Heart sounds: Normal heart sounds. No murmur heard. No friction rub. No gallop. Pulmonary:      Breath sounds: No stridor. Wheezing, rhonchi and rales present. Comments: Brief periods of apnea observed. Patient reports he does have sleep apnea and has CPAP at home that he uses intermittently  Abdominal:      General: There is distension. Tenderness: There is no abdominal tenderness. There is no guarding. Hernia: No hernia is present. Comments: Slightly firm, bowel sounds hypoactive   Musculoskeletal:         General: No tenderness or signs of injury. Cervical back: Normal range of motion and neck supple. Right lower leg: Edema present. Left lower leg: Edema present. Skin:     General: Skin is warm and dry. Coloration: Skin is not jaundiced or pale. Findings: No bruising, erythema, lesion or rash. Neurological:      General: No focal deficit present. Psychiatric:         Attention and Perception: He is inattentive.          Mood and Affect: Affect normal.         Speech: Speech is delayed. Behavior: Behavior is slowed. Thought Content: Thought content normal.         Cognition and Memory: Cognition and memory normal.         Judgment: Judgment normal.      Comments: Patient appears sedate, but does respond appropriately. Speech clear and appropriate.          Investigations:      Laboratory Testing:  Recent Results (from the past 24 hour(s))   EKG 12 Lead    Collection Time: 07/11/21  6:04 PM   Result Value Ref Range    Ventricular Rate 103 BPM    Atrial Rate 103 BPM    P-R Interval 146 ms    QRS Duration 90 ms    Q-T Interval 388 ms    QTc Calculation (Bazett) 508 ms    P Axis 66 degrees    R Axis 70 degrees    T Axis 81 degrees   CBC Auto Differential    Collection Time: 07/11/21  6:20 PM   Result Value Ref Range    WBC 10.8 3.5 - 11.0 k/uL    RBC 5.71 4.5 - 5.9 m/uL    Hemoglobin 17.3 13.5 - 17.5 g/dL    Hematocrit 53.5 (H) 41 - 53 %    MCV 93.8 80 - 100 fL    MCH 30.3 26 - 34 pg    MCHC 32.4 31 - 37 g/dL    RDW 16.4 (H) 12.5 - 15.4 %    Platelets 023 948 - 740 k/uL    MPV 8.4 6.0 - 12.0 fL    NRBC Automated NOT REPORTED per 100 WBC    Differential Type NOT REPORTED     Seg Neutrophils 92 (H) 36 - 66 %    Lymphocytes 5 (L) 24 - 44 %    Monocytes 2 2 - 11 %    Eosinophils % 0 (L) 1 - 4 %    Basophils 1 0 - 2 %    Immature Granulocytes NOT REPORTED 0 %    Segs Absolute 10.00 (H) 1.8 - 7.7 k/uL    Absolute Lymph # 0.50 (L) 1.0 - 4.8 k/uL    Absolute Mono # 0.20 0.1 - 1.2 k/uL    Absolute Eos # 0.00 0.0 - 0.4 k/uL    Basophils Absolute 0.10 0.0 - 0.2 k/uL    Absolute Immature Granulocyte NOT REPORTED 0.00 - 0.30 k/uL    WBC Morphology NOT REPORTED     RBC Morphology NOT REPORTED     Platelet Estimate NOT REPORTED    Comprehensive Metabolic Panel    Collection Time: 07/11/21  6:20 PM   Result Value Ref Range    Glucose 162 (H) 70 - 99 mg/dL    BUN 19 6 - 20 mg/dL    CREATININE 1.00 0.70 - 1.20 mg/dL    Bun/Cre Ratio NOT REPORTED 9 - 20    Calcium 9.2 8.6 - 10.4 mg/dL    Sodium 137 135 - 144 mmol/L    Potassium 4.6 3.7 - 5.3 mmol/L    Chloride 101 98 - 107 mmol/L    CO2 21 20 - 31 mmol/L    Anion Gap 15 9 - 17 mmol/L    Alkaline Phosphatase 118 40 - 129 U/L    ALT 56 (H) 5 - 41 U/L    AST 29 <40 U/L    Total Bilirubin 0.37 0.3 - 1.2 mg/dL    Total Protein 7.3 6.4 - 8.3 g/dL    Albumin 4.0 3.5 - 5.2 g/dL    Albumin/Globulin Ratio 1.2 1.0 - 2.5    GFR Non-African American >60 >60 mL/min    GFR African American >60 >60 mL/min    GFR Comment          GFR Staging NOT REPORTED    Troponin    Collection Time: 07/11/21  6:20 PM   Result Value Ref Range    Troponin, High Sensitivity 24 (H) 0 - 22 ng/L    Troponin T NOT REPORTED <0.03 ng/mL    Troponin Interp NOT REPORTED    Lipase    Collection Time: 07/11/21  6:20 PM   Result Value Ref Range    Lipase 18 13 - 60 U/L   Brain Natriuretic Peptide    Collection Time: 07/11/21  6:20 PM   Result Value Ref Range    Pro- (H) <300 pg/mL    BNP Interpretation Pro-BNP Reference Range:    Urinalysis Reflex to Culture    Collection Time: 07/11/21  7:10 PM    Specimen: Urine, clean catch   Result Value Ref Range    Color, UA YELLOW YELLOW    Turbidity UA CLEAR CLEAR    Glucose, Ur 3+ (A) NEGATIVE    Bilirubin Urine NEGATIVE NEGATIVE    Ketones, Urine NEGATIVE NEGATIVE    Specific Gravity, UA 1.020 1.005 - 1.030    Urine Hgb NEGATIVE NEGATIVE    pH, UA 5.5 5.0 - 8.0    Protein, UA NEGATIVE NEGATIVE    Urobilinogen, Urine Normal Normal    Nitrite, Urine NEGATIVE NEGATIVE    Leukocyte Esterase, Urine NEGATIVE NEGATIVE    Urinalysis Comments       Microscopic exam not performed based on chemical results unless requested in original order. Urinalysis Comments          Urinalysis Comments       Utilizing a urinalysis as the only screening method to exclude a potential uropathogen can be unreliable in many patient populations.   Rapid screening tests are less sensitive than culture and if UTI is a clinical possibility, culture should be considered despite a negative urinalysis.    Troponin    Collection Time: 07/11/21  8:39 PM   Result Value Ref Range    Troponin, High Sensitivity 27 (H) 0 - 22 ng/L    Troponin T NOT REPORTED <0.03 ng/mL    Troponin Interp NOT REPORTED    Hemoglobin A1c    Collection Time: 07/12/21 12:37 AM   Result Value Ref Range    Hemoglobin A1C 7.2 (H) 4.0 - 6.0 %    Estimated Avg Glucose 160 mg/dL   POCT troponin    Collection Time: 07/12/21  1:06 AM   Result Value Ref Range    POC Troponin I 0.03 0.00 - 0.10 ng/mL    POC Troponin Interp         Brain Natriuretic Peptide    Collection Time: 07/12/21  4:37 AM   Result Value Ref Range    Pro- (H) <300 pg/mL    BNP Interpretation Pro-BNP Reference Range:    CBC    Collection Time: 07/12/21  4:37 AM   Result Value Ref Range    WBC 14.4 (H) 3.5 - 11.0 k/uL    RBC 5.19 4.5 - 5.9 m/uL    Hemoglobin 15.6 13.5 - 17.5 g/dL    Hematocrit 48.6 41 - 53 %    MCV 93.7 80 - 100 fL    MCH 30.1 26 - 34 pg    MCHC 32.1 31 - 37 g/dL    RDW 16.2 (H) 12.5 - 15.4 %    Platelets 702 232 - 605 k/uL    MPV 8.1 6.0 - 12.0 fL    NRBC Automated NOT REPORTED per 100 WBC   Comprehensive Metabolic Panel w/ Reflex to MG    Collection Time: 07/12/21  4:37 AM   Result Value Ref Range    Glucose 145 (H) 70 - 99 mg/dL    BUN 23 (H) 6 - 20 mg/dL    CREATININE 1.03 0.70 - 1.20 mg/dL    Bun/Cre Ratio NOT REPORTED 9 - 20    Calcium 8.7 8.6 - 10.4 mg/dL    Sodium 138 135 - 144 mmol/L    Potassium 4.9 3.7 - 5.3 mmol/L    Chloride 100 98 - 107 mmol/L    CO2 29 20 - 31 mmol/L    Anion Gap 9 9 - 17 mmol/L    Alkaline Phosphatase 103 40 - 129 U/L    ALT 43 (H) 5 - 41 U/L    AST 23 <40 U/L    Total Bilirubin 0.39 0.3 - 1.2 mg/dL    Total Protein 6.5 6.4 - 8.3 g/dL    Albumin 3.7 3.5 - 5.2 g/dL    Albumin/Globulin Ratio 1.3 1.0 - 2.5    GFR Non-African American >60 >60 mL/min    GFR African American >60 >60 mL/min    GFR Comment          GFR Staging NOT REPORTED    Lipid Panel    Collection Time: 07/12/21  4:37 AM   Result Value Ref Range Cholesterol 162 <200 mg/dL    HDL 40 (L) >40 mg/dL    LDL Cholesterol 78 0 - 130 mg/dL    Chol/HDL Ratio 4.1 <5    Triglycerides 220 (H) <150 mg/dL    VLDL NOT REPORTED (H) 1 - 30 mg/dL   EKG 12 Lead    Collection Time: 07/12/21  4:39 AM   Result Value Ref Range    Ventricular Rate 78 BPM    Atrial Rate 78 BPM    P-R Interval 150 ms    QRS Duration 98 ms    Q-T Interval 446 ms    QTc Calculation (Bazett) 508 ms    P Axis 59 degrees    R Axis 53 degrees    T Axis 66 degrees   POCT troponin    Collection Time: 07/12/21  4:55 AM   Result Value Ref Range    POC Troponin I 0.02 0.00 - 0.10 ng/mL    POC Troponin Interp         POC Glucose Fingerstick    Collection Time: 07/12/21  8:23 AM   Result Value Ref Range    POC Glucose 150 (H) 75 - 110 mg/dL   POC Glucose Fingerstick    Collection Time: 07/12/21 12:08 PM   Result Value Ref Range    POC Glucose 169 (H) 75 - 110 mg/dL   Drug screen multi urine    Collection Time: 07/12/21 12:44 PM   Result Value Ref Range    Amphetamine Screen, Ur NEGATIVE NEGATIVE    Barbiturate Screen, Ur NEGATIVE NEGATIVE    Benzodiazepine Screen, Urine NEGATIVE NEGATIVE    Cocaine Metabolite, Urine NEGATIVE NEGATIVE    Methadone Screen, Urine NEGATIVE NEGATIVE    Opiates, Urine NEGATIVE NEGATIVE    Phencyclidine, Urine NEGATIVE NEGATIVE    Propoxyphene, Urine NOT REPORTED NEGATIVE    Cannabinoid Scrn, Ur NEGATIVE NEGATIVE    Oxycodone Screen, Ur NEGATIVE NEGATIVE    Methamphetamine, Urine NOT REPORTED NEGATIVE    Tricyclic Antidepressants, Urine NOT REPORTED NEGATIVE    MDMA, Urine NOT REPORTED NEGATIVE    Buprenorphine Urine NOT REPORTED NEGATIVE    Test Information       Assay provides medical screening only. The absence of expected drug(s) and/or metabolite(s) may indicate diluted or adulterated urine, limitations of testing or timing of collection.        Imaging/Diagnostics:    XR CHEST (SINGLE VIEW FRONTAL)    Result Date: 7/12/2021  Cardiomegaly with worsening vascular congestion and perihilar edema. XR CHEST (2 VW)    Result Date: 7/11/2021  Cardiomegaly, mild central venous congestion       Assessment :      Hospital Problems         Last Modified POA    * (Principal) Hypertensive emergency 7/12/2021 Yes    Hypertensive urgency 7/12/2021 Yes    Tobacco abuse 7/12/2021 Yes    Hyperlipidemia 7/12/2021 Yes    COPD (chronic obstructive pulmonary disease) ([facility] [address] 7/12/2021 Yes    Atypical chest pain 7/12/2021 Yes    Noncompliance 7/12/2021 Yes    Grade I diastolic dysfunction 2/66/3824 Yes    Type 2 diabetes mellitus, without long-term current use of insulin ([facility] [address] 7/12/2021 Yes          Plan:     Patient status inpatient in the Medical ICU    1. Hypertensive urgency: Nitro drip changed to Cardene drip. Titrate to keep SBP in 140-160 range. Daily BMP. Place electrolytes as needed. Monitor renal function. Monitor intake and output. 2. Tobacco abuse: Patient counseled to stop smoking as he smokes up to 2 packs/day at this time. Continue to reinforce smoking cessation throughout hospital stay. 3. Hyperlipidemia: Continue statin  4. Atypical chest pain: Continuous telemetry. Patient reports chest pressure has resolved at this time  5. Noncompliance: Case management to assist with discharge plan and help arrange assistance for obtaining needed prescriptions  6. Grade 1 diastolic dysfunction: Lasix 40 mg twice daily. Continue hydrochlorothiazide. BMP daily. Replace electrolytes as needed. 7. Diabetes: Monitor blood glucose before meals and at bedtime. Sliding scale insulin as ordered. Consider starting oral antidiabetic agent and encourage weight loss. Carb controlled diet. Further diabetic management as outpatient per PCP.     Consultations:   IP CONSULT TO SOCIAL WORK  IP CONSULT TO PULMONOLOGY     Patient is admitted as inpatient status because of co-morbidities listed above, severity of signs and symptoms as outlined, requirement for current medical therapies and most importantly because of direct risk to patient if care not provided in a hospital setting. Expected length of stay > 48 hours.     CASIMIRO Medley NP  7/12/2021  4:03 PM    Copy sent to Dr. Maye Garcia DO

## 2021-07-13 LAB
ABSOLUTE EOS #: 0 K/UL (ref 0–0.4)
ABSOLUTE IMMATURE GRANULOCYTE: ABNORMAL K/UL (ref 0–0.3)
ABSOLUTE LYMPH #: 0.6 K/UL (ref 1–4.8)
ABSOLUTE MONO #: 0.2 K/UL (ref 0.1–1.2)
ANION GAP SERPL CALCULATED.3IONS-SCNC: 15 MMOL/L (ref 9–17)
BASOPHILS # BLD: 0 % (ref 0–2)
BASOPHILS ABSOLUTE: 0 K/UL (ref 0–0.2)
BUN BLDV-MCNC: 34 MG/DL (ref 6–20)
BUN/CREAT BLD: ABNORMAL (ref 9–20)
CALCIUM SERPL-MCNC: 9.4 MG/DL (ref 8.6–10.4)
CHLORIDE BLD-SCNC: 91 MMOL/L (ref 98–107)
CO2: 25 MMOL/L (ref 20–31)
CREAT SERPL-MCNC: 1.16 MG/DL (ref 0.7–1.2)
DIFFERENTIAL TYPE: ABNORMAL
EOSINOPHILS RELATIVE PERCENT: 0 % (ref 1–4)
GFR AFRICAN AMERICAN: >60 ML/MIN
GFR NON-AFRICAN AMERICAN: >60 ML/MIN
GFR SERPL CREATININE-BSD FRML MDRD: ABNORMAL ML/MIN/{1.73_M2}
GFR SERPL CREATININE-BSD FRML MDRD: ABNORMAL ML/MIN/{1.73_M2}
GLUCOSE BLD-MCNC: 197 MG/DL (ref 75–110)
GLUCOSE BLD-MCNC: 202 MG/DL (ref 75–110)
GLUCOSE BLD-MCNC: 205 MG/DL (ref 75–110)
GLUCOSE BLD-MCNC: 243 MG/DL (ref 70–99)
GLUCOSE BLD-MCNC: 264 MG/DL (ref 75–110)
HCT VFR BLD CALC: 52.9 % (ref 41–53)
HEMOGLOBIN: 17.1 G/DL (ref 13.5–17.5)
IMMATURE GRANULOCYTES: ABNORMAL %
LYMPHOCYTES # BLD: 4 % (ref 24–44)
MAGNESIUM: 2.2 MG/DL (ref 1.6–2.6)
MCH RBC QN AUTO: 30.2 PG (ref 26–34)
MCHC RBC AUTO-ENTMCNC: 32.4 G/DL (ref 31–37)
MCV RBC AUTO: 93.3 FL (ref 80–100)
MONOCYTES # BLD: 1 % (ref 2–11)
NRBC AUTOMATED: ABNORMAL PER 100 WBC
PDW BLD-RTO: 16.1 % (ref 12.5–15.4)
PLATELET # BLD: 230 K/UL (ref 140–450)
PLATELET ESTIMATE: ABNORMAL
PMV BLD AUTO: 8.1 FL (ref 6–12)
POTASSIUM SERPL-SCNC: 4.4 MMOL/L (ref 3.7–5.3)
RBC # BLD: 5.67 M/UL (ref 4.5–5.9)
RBC # BLD: ABNORMAL 10*6/UL
SEG NEUTROPHILS: 95 % (ref 36–66)
SEGMENTED NEUTROPHILS ABSOLUTE COUNT: 15.5 K/UL (ref 1.8–7.7)
SODIUM BLD-SCNC: 131 MMOL/L (ref 135–144)
WBC # BLD: 16.4 K/UL (ref 3.5–11)
WBC # BLD: ABNORMAL 10*3/UL

## 2021-07-13 PROCEDURE — 2700000000 HC OXYGEN THERAPY PER DAY

## 2021-07-13 PROCEDURE — 6370000000 HC RX 637 (ALT 250 FOR IP): Performed by: NURSE PRACTITIONER

## 2021-07-13 PROCEDURE — 80048 BASIC METABOLIC PNL TOTAL CA: CPT

## 2021-07-13 PROCEDURE — 6370000000 HC RX 637 (ALT 250 FOR IP): Performed by: INTERNAL MEDICINE

## 2021-07-13 PROCEDURE — 83735 ASSAY OF MAGNESIUM: CPT

## 2021-07-13 PROCEDURE — 6360000002 HC RX W HCPCS: Performed by: INTERNAL MEDICINE

## 2021-07-13 PROCEDURE — 85025 COMPLETE CBC W/AUTO DIFF WBC: CPT

## 2021-07-13 PROCEDURE — 36415 COLL VENOUS BLD VENIPUNCTURE: CPT

## 2021-07-13 PROCEDURE — 94761 N-INVAS EAR/PLS OXIMETRY MLT: CPT

## 2021-07-13 PROCEDURE — 82947 ASSAY GLUCOSE BLOOD QUANT: CPT

## 2021-07-13 PROCEDURE — 6360000002 HC RX W HCPCS: Performed by: NURSE PRACTITIONER

## 2021-07-13 PROCEDURE — 2500000003 HC RX 250 WO HCPCS: Performed by: NURSE PRACTITIONER

## 2021-07-13 PROCEDURE — 94660 CPAP INITIATION&MGMT: CPT

## 2021-07-13 PROCEDURE — 2000000000 HC ICU R&B

## 2021-07-13 PROCEDURE — 99233 SBSQ HOSP IP/OBS HIGH 50: CPT | Performed by: INTERNAL MEDICINE

## 2021-07-13 PROCEDURE — 94640 AIRWAY INHALATION TREATMENT: CPT

## 2021-07-13 PROCEDURE — 2580000003 HC RX 258: Performed by: NURSE PRACTITIONER

## 2021-07-13 RX ORDER — CLONIDINE 0.1 MG/24H
1 PATCH, EXTENDED RELEASE TRANSDERMAL WEEKLY
Status: DISCONTINUED | OUTPATIENT
Start: 2021-07-13 | End: 2021-07-13

## 2021-07-13 RX ORDER — IRBESARTAN 300 MG/1
300 TABLET ORAL DAILY
Status: ON HOLD | COMMUNITY
End: 2021-07-15 | Stop reason: HOSPADM

## 2021-07-13 RX ORDER — CARVEDILOL 25 MG/1
25 TABLET ORAL 2 TIMES DAILY
COMMUNITY

## 2021-07-13 RX ORDER — HYDRALAZINE HYDROCHLORIDE 20 MG/ML
10 INJECTION INTRAMUSCULAR; INTRAVENOUS EVERY 4 HOURS PRN
Status: DISCONTINUED | OUTPATIENT
Start: 2021-07-13 | End: 2021-07-15 | Stop reason: HOSPADM

## 2021-07-13 RX ORDER — TERBINAFINE HYDROCHLORIDE 250 MG/1
250 TABLET ORAL DAILY
COMMUNITY
End: 2021-07-20 | Stop reason: SDUPTHER

## 2021-07-13 RX ORDER — FUROSEMIDE 10 MG/ML
40 INJECTION INTRAMUSCULAR; INTRAVENOUS DAILY
Status: DISCONTINUED | OUTPATIENT
Start: 2021-07-14 | End: 2021-07-15

## 2021-07-13 RX ORDER — CLONIDINE HYDROCHLORIDE 0.1 MG/1
0.1 TABLET ORAL 2 TIMES DAILY
Status: DISCONTINUED | OUTPATIENT
Start: 2021-07-13 | End: 2021-07-15 | Stop reason: HOSPADM

## 2021-07-13 RX ORDER — PREDNISONE 20 MG/1
40 TABLET ORAL DAILY
Status: DISCONTINUED | OUTPATIENT
Start: 2021-07-14 | End: 2021-07-15 | Stop reason: HOSPADM

## 2021-07-13 RX ADMIN — INSULIN LISPRO 2 UNITS: 100 INJECTION, SOLUTION INTRAVENOUS; SUBCUTANEOUS at 17:40

## 2021-07-13 RX ADMIN — MONTELUKAST SODIUM 10 MG: 10 TABLET, FILM COATED ORAL at 20:33

## 2021-07-13 RX ADMIN — LOSARTAN POTASSIUM 100 MG: 50 TABLET, FILM COATED ORAL at 08:07

## 2021-07-13 RX ADMIN — INSULIN LISPRO 2 UNITS: 100 INJECTION, SOLUTION INTRAVENOUS; SUBCUTANEOUS at 08:10

## 2021-07-13 RX ADMIN — IPRATROPIUM BROMIDE AND ALBUTEROL SULFATE 1 AMPULE: .5; 3 SOLUTION RESPIRATORY (INHALATION) at 11:58

## 2021-07-13 RX ADMIN — ATORVASTATIN CALCIUM 40 MG: 40 TABLET, FILM COATED ORAL at 08:07

## 2021-07-13 RX ADMIN — HYDROCHLOROTHIAZIDE 25 MG: 25 TABLET ORAL at 08:07

## 2021-07-13 RX ADMIN — BUPROPION HYDROCHLORIDE 300 MG: 150 TABLET, FILM COATED, EXTENDED RELEASE ORAL at 08:06

## 2021-07-13 RX ADMIN — NICARDIPINE HYDROCHLORIDE 5 MG/HR: 0.1 INJECTION INTRAVENOUS at 00:44

## 2021-07-13 RX ADMIN — INSULIN LISPRO 1 UNITS: 100 INJECTION, SOLUTION INTRAVENOUS; SUBCUTANEOUS at 12:24

## 2021-07-13 RX ADMIN — AZITHROMYCIN MONOHYDRATE 500 MG: 250 TABLET ORAL at 08:07

## 2021-07-13 RX ADMIN — IPRATROPIUM BROMIDE AND ALBUTEROL SULFATE 1 AMPULE: .5; 3 SOLUTION RESPIRATORY (INHALATION) at 15:22

## 2021-07-13 RX ADMIN — METOPROLOL TARTRATE 75 MG: 25 TABLET, FILM COATED ORAL at 20:33

## 2021-07-13 RX ADMIN — METHYLPREDNISOLONE SODIUM SUCCINATE 40 MG: 40 INJECTION, POWDER, FOR SOLUTION INTRAMUSCULAR; INTRAVENOUS at 04:36

## 2021-07-13 RX ADMIN — ENOXAPARIN SODIUM 40 MG: 100 INJECTION SUBCUTANEOUS at 08:07

## 2021-07-13 RX ADMIN — IPRATROPIUM BROMIDE AND ALBUTEROL SULFATE 1 AMPULE: .5; 3 SOLUTION RESPIRATORY (INHALATION) at 20:19

## 2021-07-13 RX ADMIN — NICARDIPINE HYDROCHLORIDE 3 MG/HR: 0.1 INJECTION INTRAVENOUS at 08:52

## 2021-07-13 RX ADMIN — HYDRALAZINE HYDROCHLORIDE 10 MG: 20 INJECTION INTRAMUSCULAR; INTRAVENOUS at 18:54

## 2021-07-13 RX ADMIN — BUDESONIDE AND FORMOTEROL FUMARATE DIHYDRATE 2 PUFF: 160; 4.5 AEROSOL RESPIRATORY (INHALATION) at 20:19

## 2021-07-13 RX ADMIN — FUROSEMIDE 40 MG: 10 INJECTION, SOLUTION INTRAMUSCULAR; INTRAVENOUS at 08:07

## 2021-07-13 RX ADMIN — SODIUM CHLORIDE, PRESERVATIVE FREE 10 ML: 5 INJECTION INTRAVENOUS at 20:33

## 2021-07-13 RX ADMIN — INSULIN LISPRO 3 UNITS: 100 INJECTION, SOLUTION INTRAVENOUS; SUBCUTANEOUS at 20:32

## 2021-07-13 RX ADMIN — SODIUM CHLORIDE, PRESERVATIVE FREE 10 ML: 5 INJECTION INTRAVENOUS at 08:09

## 2021-07-13 RX ADMIN — DULOXETINE 60 MG: 30 CAPSULE, DELAYED RELEASE ORAL at 08:07

## 2021-07-13 RX ADMIN — METHYLPREDNISOLONE SODIUM SUCCINATE 40 MG: 40 INJECTION, POWDER, FOR SOLUTION INTRAMUSCULAR; INTRAVENOUS at 12:24

## 2021-07-13 RX ADMIN — METOPROLOL TARTRATE 50 MG: 50 TABLET, FILM COATED ORAL at 08:07

## 2021-07-13 RX ADMIN — BUDESONIDE AND FORMOTEROL FUMARATE DIHYDRATE 2 PUFF: 160; 4.5 AEROSOL RESPIRATORY (INHALATION) at 07:26

## 2021-07-13 RX ADMIN — LEVOTHYROXINE SODIUM 25 MCG: 0.03 TABLET ORAL at 08:07

## 2021-07-13 RX ADMIN — CLONIDINE HYDROCHLORIDE 0.1 MG: 0.1 TABLET ORAL at 18:17

## 2021-07-13 RX ADMIN — IPRATROPIUM BROMIDE AND ALBUTEROL SULFATE 1 AMPULE: .5; 3 SOLUTION RESPIRATORY (INHALATION) at 07:26

## 2021-07-13 ASSESSMENT — PAIN SCALES - GENERAL
PAINLEVEL_OUTOF10: 0

## 2021-07-13 ASSESSMENT — ENCOUNTER SYMPTOMS
DIARRHEA: 0
SHORTNESS OF BREATH: 1
ABDOMINAL PAIN: 0
CONSTIPATION: 0
SHORTNESS OF BREATH: 0
CHEST TIGHTNESS: 0
VOMITING: 0
SORE THROAT: 0
TROUBLE SWALLOWING: 0
NAUSEA: 0
COLOR CHANGE: 1
COUGH: 1
COUGH: 0
WHEEZING: 0
BLOOD IN STOOL: 0
WHEEZING: 1

## 2021-07-13 NOTE — CARE COORDINATION
SW met with pt and aunt in room. Pt gave permission to speak in front of aunt. Pt reports he lives at home alone in [place] with dog. Pt reports he has transportation to and from [facility]. Pt is currently on unemployment. He does not have insurance. HELP referral made by CM. Pt denies issues with mental health. Pt reports consuming 6-12 beers a day but denies wanting resources or treatment at this time. Pt reports he went to treatment in the past when he was younger for DUI. Pt states he has support around him. SW answered questions regarding medicaid and insurance. Pt denies other needs at this time. SW will continue following.

## 2021-07-13 NOTE — PROGRESS NOTES
Pulmonary Progress Note    CC:  Chief Complaint   Patient presents with    Shortness of Breath     ongoing    Chest Pain      Subjective: No work of breathing but still has cough clear sputum. No chest pain breathing is better. He still is on supplemental oxygen and is also on Cardene drip. Smokes 1 to 1-1/2 packs/day    Review of Systems -  General ROS: negative for - chills, fatigue, fever or weight loss  ENT ROS: negative for - headaches, oral lesions or sore throat  Cardiovascular ROS: no chest pain , orthopnea or pnd   Gastrointestinal ROS: no abdominal pain, change in bowel habits, or black or bloody stools  Skin - no rash   Neuro - no blurry vision , no loc . No focal weakness   msk - no jt tenderness or swelling    Vascular - no claudication , rest completed and negative   Lymphatic - complete and negative   Hematology - oncology - complete and negative   Allergy immunology - complete and negative    no burning or hematuria           PAST MEDICAL HISTORY:       Diagnosis Date    Arthritis     COPD (chronic obstructive pulmonary disease) ([facility] [address]     Hyperlipidemia     Hypertension          Family History:       Problem Relation Age of Onset    Other Mother     Heart Attack Father     Other Brother        SURGICAL HISTORY:   Past Surgical History:   Procedure Laterality Date    ANKLE SURGERY                TOBACCO:   reports that he has been smoking cigarettes. He has a 30.00 pack-year smoking history. He has never used smokeless tobacco.  ETOH:   reports current alcohol use of about 13.0 - 14.0 standard drinks of alcohol per week. ALLERGIES:    Allergies   Allergen Reactions    Pcn [Penicillins] Anaphylaxis     From childhood       Home Meds:   Prior to Admission medications    Medication Sig Start Date End Date Taking?  Authorizing Provider   montelukast (SINGULAIR) 10 MG tablet Take 10 mg by mouth nightly   Yes Historical Provider, MD   buPROPion (WELLBUTRIN XL) 300 MG extended release tablet Take 300 mg by mouth every morning   Yes Historical Provider, MD   empagliflozin (JARDIANCE) 10 MG tablet Take 10 mg by mouth daily   Yes Historical Provider, MD   Semaglutide 3 MG TABS Take 3 mg by mouth   Yes Historical Provider, MD   empagliflozin (JARDIANCE) 10 MG tablet Take 10 mg by mouth daily Jardiance   Yes Historical Provider, MD   ALBUTEROL IN Inhale into the lungs every 4 hours as needed    Yes Historical Provider, MD   atorvastatin (LIPITOR) 40 MG tablet Take 40 mg by mouth daily   Yes Historical Provider, MD   levothyroxine (SYNTHROID) 25 MCG tablet Take 25 mcg by mouth Daily   Yes Historical Provider, MD   DULoxetine (CYMBALTA) 60 MG extended release capsule Take 60 mg by mouth daily    Historical Provider, MD   cariprazine hcl (VRAYLAR) 1.5 MG capsule Take 1.5 mg by mouth daily    Historical Provider, MD         Intake/Output Summary (Last 24 hours) at 7/13/2021 1705  Last data filed at 7/13/2021 1500  Gross per 24 hour   Intake 1222.2 ml   Output 6225 ml   Net -5002.8 ml         Diet   ADULT DIET; Regular;  Low Sodium (2 gm)    Vitals:   BP (!) 162/104   Pulse 86   Temp 97.6 °F (36.4 °C) (Oral)   Resp 21   Ht 6' (1.829 m)   Wt (!) 310 lb (140.6 kg)   SpO2 (!) 89%   BMI 42.04 kg/m²  on         I/O (24 Hours)    Patient Vitals for the past 8 hrs:   BP Pulse Resp SpO2   07/13/21 1615 (!) 162/104 86 21 (!) 89 %   07/13/21 1600 (!) 130/92 82 17 92 %   07/13/21 1545 (!) 149/116 80 20 95 %   07/13/21 1530 (!) 143/75 79 12 97 %   07/13/21 1523 -- -- 24 91 %   07/13/21 1515 (!) 159/99 81 22 92 %   07/13/21 1500 (!) 163/103 82 17 94 %   07/13/21 1445 (!) 161/95 80 20 91 %   07/13/21 1430 (!) 159/96 82 18 91 %   07/13/21 1415 (!) 149/98 82 21 90 %   07/13/21 1400 131/70 84 21 93 %   07/13/21 1345 (!) 155/94 83 21 90 %   07/13/21 1330 (!) 140/87 82 18 91 %   07/13/21 1315 (!) 144/85 82 22 90 %   07/13/21 1300 (!) 143/86 84 18 90 %   07/13/21 1245 (!) 142/88 83 25 92 %   07/13/21 1230 (!) 161/101 86 28 91 %   07/13/21 1215 (!) 152/94 78 24 (!) 87 %   07/13/21 1200 (!) 142/88 76 23 93 %   07/13/21 1159 -- -- 19 94 %   07/13/21 1145 (!) 141/88 72 20 92 %   07/13/21 1130 (!) 148/89 76 22 96 %   07/13/21 1115 (!) 150/95 75 19 93 %   07/13/21 1100 (!) 151/94 73 16 93 %   07/13/21 1045 130/67 77 13 95 %   07/13/21 1000 (!) 155/94 79 24 91 %   07/13/21 0945 (!) 152/93 77 17 92 %   07/13/21 0930 (!) 146/97 77 23 95 %   07/13/21 0915 (!) 164/93 77 19 (!) 89 %       Intake/Output Summary (Last 24 hours) at 7/13/2021 1705  Last data filed at 7/13/2021 1500  Gross per 24 hour   Intake 1222.2 ml   Output 6225 ml   Net -5002.8 ml     I/O last 3 completed shifts: In: 1908 [P.O.:880; I.V.:1028]  Out: 6380 [Urine:5125; Other:1100]   Date 07/13/21 0000 - 07/13/21 2359   Shift 3522-8978 6035-8223 5347-2504 24 Hour Total   INTAKE   P.O.(mL/kg/hr) 660(0.6)   660   I. V.(mL/kg) 245(1.7) 97.2(0.7)  342.2(2.4)   Shift Total(mL/kg) 905(6.4) 97.2(0.7)  1002. 2(7.1)   OUTPUT   Urine(mL/kg/hr)  9189(1.9)  2525   Other(mL/kg) 1100(7.8)   1100(7.8)   Shift Total(mL/kg) 1100(7.8) 3805(65)  3625(25.8)   Weight (kg) 140.6 140.6 140.6 140.6     Patient Vitals for the past 96 hrs (Last 3 readings):   Weight   07/13/21 0447 (!) 310 lb (140.6 kg)   07/12/21 0509 (!) 316 lb 11.2 oz (143.7 kg)   07/11/21 1740 (!) 320 lb (145.2 kg)          PHYSICAL EXAMINATION:  Neck: Short thick neck, low hanging soft palate, large tongue, large uvula. No adenopathy, no goiter,     Head and neck atraumatic, normocephalic    Lymph nodes-no cervical, supraclavicular lymphadenopathy    Neck-no JVP elevation    Lungs - AP diameter of chest increased. Thoracic expansion and diaphragmatic excursion diminished. BS diminished and expiratory phase prolonged. No dullness to percussion or tenderness to palpation. No bronchial breath sounds . CVS- S1, S2 regular. No S3 no S4, no murmurs    Abdomen-nontender, nondistended. Bowel sounds are present.   No organomegaly    Lower extremity++ edema    Upper extremity-no edema    Neurological-grossly normal cranial nerves.   No overt motor deficit             Medications:    Scheduled Meds:   metoprolol tartrate  75 mg Oral BID    cloNIDine  1 patch Transdermal Weekly    [START ON 7/14/2021] furosemide  40 mg Intravenous Daily    ipratropium-albuterol  1 ampule Inhalation Q4H WA    methylPREDNISolone  40 mg Intravenous Q8H    azithromycin  500 mg Oral Daily    budesonide-formoterol  2 puff Inhalation BID    atorvastatin  40 mg Oral Daily    buPROPion  300 mg Oral QAM    [Held by provider] cariprazine hcl  1.5 mg Oral Daily    DULoxetine  60 mg Oral Daily    [Held by provider] empagliflozin  10 mg Oral Daily    hydroCHLOROthiazide  25 mg Oral Daily    losartan  100 mg Oral Daily    levothyroxine  25 mcg Oral Daily    montelukast  10 mg Oral Nightly    Semaglutide  3 mg Oral Daily    sodium chloride flush  5-40 mL Intravenous 2 times per day    enoxaparin  40 mg Subcutaneous Daily    sodium chloride flush  5-40 mL Intravenous 2 times per day    nicotine  1 patch Transdermal Daily    insulin lispro  0-6 Units Subcutaneous TID     insulin lispro  0-3 Units Subcutaneous Nightly       Continuous Infusions:   niCARdipine Stopped (07/13/21 1405)    sodium chloride      sodium chloride 25 mL (07/11/21 2230)    dextrose         PRN Meds:  niCARdipine, albuterol sulfate HFA, sodium chloride flush, sodium chloride, ondansetron **OR** ondansetron, acetaminophen **OR** acetaminophen, sodium chloride flush, sodium chloride, LORazepam **OR** LORazepam **OR** LORazepam **OR** LORazepam **OR** LORazepam **OR** LORazepam **OR** LORazepam **OR** LORazepam, glucose, dextrose, glucagon (rDNA), dextrose    Labs:  CBC:   Recent Labs     07/11/21  1820 07/12/21  0437 07/13/21  1241   WBC 10.8 14.4* 16.4*   HGB 17.3 15.6 17.1   HCT 53.5* 48.6 52.9   MCV 93.8 93.7 93.3    237 230     BMP:   Recent Labs 07/11/21 1820 07/12/21  0437 07/13/21  1241    138 131*   K 4.6 4.9 4.4    100 91*   CO2 21 29 25   BUN 19 23* 34*   CREATININE 1.00 1.03 1.16     LIVER PROFILE:   Recent Labs     07/11/21  1820 07/12/21  0437   AST 29 23   ALT 56* 43*   LIPASE 18  --    BILITOT 0.37 0.39   ALKPHOS 118 103     PT/INR: No results for input(s): PROTIME, INR in the last 72 hours. APTT: No results for input(s): APTT in the last 72 hours. UA:  Recent Labs     07/11/21 1910   COLORU YELLOW   PHUR 5.5   SPECGRAV 1.020   LEUKOCYTESUR NEGATIVE   UROBILINOGEN Normal   BILIRUBINUR NEGATIVE   GLUCOSEU 3+*     No results for input(s): PHART, LTS4NOP, PO2ART in the last 72 hours. ABG   No results found for: PH, PCO2, PO2, HCO3, O2SAT  No results found for: IFIO2, MODE, SETTIDVOL, SETPEEP    XR CHEST (SINGLE VIEW FRONTAL)    Result Date: 7/12/2021  Cardiomegaly with worsening vascular congestion and perihilar edema. XR CHEST (2 VW)    Result Date: 7/11/2021  Cardiomegaly, mild central venous congestion       CONCLUSIONS     Summary  Normal left ventricular size with normal hyperdynamic function. EF 60-65%. Moderate to severe concentric left ventricular hypertrophy. Grade I (mild) left ventricular diastolic dysfunction. Posterior mitral annular calcification.   No significant valvular regurgitation or stenosis seen.     Signature  ----------------------------------------------------------------------------   Electronically signed by Latrice Charles RDCS(Sonographer) on 07/12/2021   01:18 PM  ----------------------------------------------------------------------------     ----------------------------------------------------------------------------   Electronically signed by Gaston Rowan(Interpreting physician) on 07/12/2021    Assessment:   Principal Problem:    Hypertensive emergency  Active Problems:    Tobacco abuse    Hyperlipidemia    COPD (chronic obstructive pulmonary disease) ([facility] [address]    Atypical chest pain Noncompliance    Grade I diastolic dysfunction    Type 2 diabetes mellitus, without long-term current use of insulin ([facility] [address]    Hypertensive urgency  Resolved Problems:    * No resolved hospital problems. *     COPD exacerbation    Plan:  Patient has CPAP at home but has not been compliant because feels the pressure is excessive. He is tolerating BiPAP in the hospital better. Will arrange outpatient retitration study to BiPAP.   Continue Symbicort  Continue albuterol as needed  Change to p.o. prednisone  Complete 5 days of azithromycin  Smoking cessation  He will need outpatient PFT  Encourage weight loss  Blood pressure control as per primary    Discussed with patient  Discussed with primary team  Electronically signed by Patricia Henley MD on 7/13/2021 at 5:05 PM

## 2021-07-13 NOTE — PROGRESS NOTES
Aerosol therapy administered the patient on 3.5 lpm cannula , was reduced to 3 lpm to continue to wean . Breath sounds expiratory wheezes . Non Productive cough . Bipap on stand by at bedside .

## 2021-07-13 NOTE — PROGRESS NOTES
Physician Progress Note      PATIENT:               Jasbir Patricio  CSN #:                  [id number]  :                       1970  ADMIT DATE:       2021 5:29 PM  [address] DATE:  RESPONDING  PROVIDER #:        Johan Douglas MD          QUERY TEXT:    Pt admitted with Hypertensive emergency and has CHF documented. If possible,   please document in progress notes and discharge summary further specificity   regarding the type and acuity of CHF:    The medical record reflects the following:  Risk Factors: CHF, COPD exacerbation, Alcohol abuse, Obesity and Noncompliance  Clinical Indicators: Pt c/o  SOB and generalized edema, feeling bloated and   weight gain. . CXR showing cardiomegaly and mild central venous   congestion. Treatment: IV Lasix, labs/monitoring    Thank-you,  Rylie House RN, CDS  [email or web address]  Options provided:  -- Acute on Chronic Diastolic CHF/HFpEF  -- Acute Diastolic CHF/HFpEF  -- Chronic Diastolic CHF/HFpEF  -- Other - I will add my own diagnosis  -- Disagree - Not applicable / Not valid  -- Disagree - Clinically unable to determine / Unknown  -- Refer to Clinical Documentation Reviewer    PROVIDER RESPONSE TEXT:    This patient is in acute diastolic CHF/HFpEF. Query created by: Claudia Jaimes on 2021 8:31 AM      QUERY TEXT:    Patient admitted with hypertensive emergency. Noted documentation of acute   respiratory failure in Pulmonary consult note. In order to support the   diagnosis of acute respiratory failure, please include additional clinical   indicators in your documentation. Or please document if the diagnosis of   acute respiratory failure has been ruled out after further study. The medical record reflects the following:  Risk Factors: Obesity, Alcohol abuse, COPD and CHF exacerbations, Smoker  Clinical Indicators: RR 18-25, no use of accessory muscles and able to speak   in complete sentences. SOB. No tri-podding. Alert and oriented.   Treatment: Supplemental oxygen 6L/NC, IV Solumedrol 40, IV Lasix,    labs/monitoring    Acute Respiratory Failure Clinical Indicators per [employer] Training Guide and   Quick Reference Guide:  pO2 < 60 mmHg or SpO2 (pulse oximetry) < 91% breathing room air  pCO2 > 50 and pH < 7.35  P/F ratio (pO2 / FIO2) < 300  pO2 decrease or pCO2 increase by 10 mmHg from baseline (if known)  Supplemental oxygen of 40% or more  Presence of respiratory distress, tachypnea, dyspnea, shortness of breath,   wheezing  Unable to speak in complete sentences  Use of accessory muscles to breathe  Extreme anxiety and feeling of impending doom  Tripod position  Confusion/altered mental status/obtunded  Options provided:  -- Acute Respiratory Failure as evidenced by, Please document evidence.   -- Acute Respiratory Failure ruled out after study  -- Other - I will add my own diagnosis  -- Disagree - Not applicable / Not valid  -- Disagree - Clinically unable to determine / Unknown  -- Refer to Clinical Documentation Reviewer    PROVIDER RESPONSE TEXT:    This patient is in acute respiratory failure as evidenced by SpO2 77 % on 3 L   oxygen    Query created by: Claudia Jaimes on 7/13/2021 8:38 AM      Electronically signed by:  Johan Douglas MD 7/13/2021 11:07 AM

## 2021-07-13 NOTE — PROGRESS NOTES
[facility]  Office: [address], DO, Stephenie Munguialand, DO, Richmond Valles, DO, Michelle Bexar Blood, DO, Julieta Gallego MD, Lupe Vogel MD, Artemio Willis MD, Angelo Slater MD, Shantelle Mendez MD, Miller Lopez MD, Luis Leonard MD, Elvira Urbano, DO, Rossy Cruz MD, Malina Parikh, DO, Chelsea Richard MD,  Saintclair Lank, DO, Pauline Samayoa MD, Stefany Kumar MD, Farhan Reyna MD, Jnenifer Hays MD, Birgit Telles MD, Everardo French MD, Daryle Springer, [facility], [facility], CNP, Amilcar Cee, CNP, Aguilar Porter, CNS, Katiana Scott, CNP, Corby Clarke, CNP, Ana Preciado, CNP, Nelia Singh, CNP, Deangelo Moreira, CNP, Vijay Stephen PA-C, Marco Antonio Lebron, [facility] ([place], [place]), Devan Pike, CNP, Marty Penny, CNP, John Jackson, CNP, Dee Orellana, CNP, Deborah Valentine, CNP, Jasmina Zavaleta, CNP, Raudel Weaver, CNP, Victory Maxwell, [address]    Progress Note    7/13/2021    12:37 PM    Name:   Swapna Machado  MRN:     [id number]     Acct:      [de-identified]   Room:   [address] [place] [address] Day:  2  Admit Date:  7/11/2021  5:29 PM    PCP:   Nelsy Jauregui DO  Code Status:  Full Code    Subjective:     C/C:   Chief Complaint   Patient presents with    Shortness of Breath     ongoing    Chest Pain     Interval History Status: improved. Sitting up in bed eating lunch during my assessment. He denies complaints. He does report an intermittent productive cough with white phlegm. He states he is not short of breath and is not having any chest pain. No problems with bowel or bladder. He reports chronic lower leg edema and discoloration but he feels like the swelling is improved today. He has been afebrile and continues to require supplemental oxygen  He remains on Cardene drip.   Catapres patch ordered as well as a cardiology consult to review current medications and echo report    Sodium 131  BUN/creatinine 34/1.16  Glucose running between 170 and 280 but the patient is on steroids-continue insulin sliding scale  WBCs up to 16.4 but the patient is receiving Solu-Medrol    Decrease Lasix IV to once daily    Brief History:     Per previous documentation    Carmelo Valdez is a 44 y.o. Non-/non  male who presents with Shortness of Breath (ongoing) and Chest Pain   and is admitted to the hospital for the management of Hypertensive emergency.     Patient reports that he has a history of high blood pressure and has not been able to comply with his antihypertensive regimen due to he has no health insurance at this time. He says that for the past month he has had chest pressure, abdomen has \"felt full\", and has had swelling in his hands and feet. He has also had some shortness of breath with activity.     While in ED, blood pressure was noted to be 161/112 with a heart rate of 101. His SPO2 was 96%. proBNP was 604, troponin 24, UA revealed 3+ glucose. He was started on a nitroglycerin infusion and admitted to the inpatient unit for further observation and management of hypertensive urgency.     This morning his blood pressure was more controlled with SBP in the 140s, however the nitro drip was up to 130 mics. Nitro drip was then switched to a Cardene drip to maintain blood pressure control. This morning he was also requiring BiPAP due to he was dropping his pulse ox while on nasal cannula. Pulmonary was consulted. Patient says he does have a known history of sleep apnea, but he only wears his CPAP intermittently.     A1c noted to be 7.2. Patient receiving sliding scale insulin before meals and at bedtime. Patient has no reported history of diabetes. Start Catapres patch   Decrease Lasix to once daily   cardiology consulted    Review of Systems:     Review of Systems   Constitutional: Negative for chills, diaphoresis and fever. HENT: Negative for congestion. Eyes: Negative for visual disturbance.    Respiratory: Negative for cough, chest tightness, shortness of breath and wheezing. Cardiovascular: Positive for leg swelling. Negative for chest pain and palpitations. Gastrointestinal: Negative for abdominal pain, blood in stool, constipation, diarrhea, nausea and vomiting. Genitourinary: Negative for difficulty urinating. Skin: Positive for color change. Neurological: Negative for dizziness, weakness, light-headedness, numbness and headaches. All other systems reviewed and are negative. Medications: Allergies:     Allergies   Allergen Reactions    Pcn [Penicillins] Anaphylaxis     From childhood       Current Meds:   Scheduled Meds:    metoprolol tartrate  75 mg Oral BID    ipratropium-albuterol  1 ampule Inhalation Q4H WA    furosemide  40 mg Intravenous BID    methylPREDNISolone  40 mg Intravenous Q8H    azithromycin  500 mg Oral Daily    budesonide-formoterol  2 puff Inhalation BID    atorvastatin  40 mg Oral Daily    buPROPion  300 mg Oral QAM    [Held by provider] cariprazine hcl  1.5 mg Oral Daily    DULoxetine  60 mg Oral Daily    [Held by provider] empagliflozin  10 mg Oral Daily    hydroCHLOROthiazide  25 mg Oral Daily    losartan  100 mg Oral Daily    levothyroxine  25 mcg Oral Daily    montelukast  10 mg Oral Nightly    Semaglutide  3 mg Oral Daily    sodium chloride flush  5-40 mL Intravenous 2 times per day    enoxaparin  40 mg Subcutaneous Daily    sodium chloride flush  5-40 mL Intravenous 2 times per day    nicotine  1 patch Transdermal Daily    insulin lispro  0-6 Units Subcutaneous TID WC    insulin lispro  0-3 Units Subcutaneous Nightly     Continuous Infusions:    niCARdipine 3 mg/hr (07/13/21 0852)    sodium chloride      sodium chloride 25 mL (07/11/21 2230)    dextrose       PRN Meds: niCARdipine, albuterol sulfate HFA, sodium chloride flush, sodium chloride, ondansetron **OR** ondansetron, acetaminophen **OR** acetaminophen, sodium chloride flush, sodium chloride, LORazepam **OR** LORazepam **OR** LORazepam **OR** LORazepam **OR** LORazepam **OR** LORazepam **OR** LORazepam **OR** LORazepam, glucose, dextrose, glucagon (rDNA), dextrose    Data:     Past Medical History:   has a past medical history of Arthritis, COPD (chronic obstructive pulmonary disease) ([address] Hyperlipidemia, and Hypertension. Social History:   reports that he has been smoking cigarettes. He has a 30.00 pack-year smoking history. He has never used smokeless tobacco. He reports current alcohol use of about 13.0 - 14.0 standard drinks of alcohol per week. He reports that he does not use drugs. Family History:   Family History   Problem Relation Age of Onset    Other Mother     Heart Attack Father     Other Brother        Vitals:  BP (!) 148/89   Pulse 76   Temp 97.6 °F (36.4 °C) (Oral)   Resp 19   Ht 6' (1.829 m)   Wt (!) 310 lb (140.6 kg)   SpO2 94%   BMI 42.04 kg/m²   Temp (24hrs), Av °F (36.7 °C), Min:97.6 °F (36.4 °C), Max:98.6 °F (37 °C)    Recent Labs     21  16221  0721  1210   POCGLU 220* 289* 202* 197*       I/O (24Hr):     Intake/Output Summary (Last 24 hours) at 2021 1237  Last data filed at 2021 1000  Gross per 24 hour   Intake 1907.95 ml   Output 5375 ml   Net -3467.05 ml       Labs:  Hematology:  Recent Labs     21  0437   WBC 10.8 14.4*   RBC 5.71 5.19   HGB 17.3 15.6   HCT 53.5* 48.6   MCV 93.8 93.7   MCH 30.3 30.1   MCHC 32.4 32.1   RDW 16.4* 16.2*    237   MPV 8.4 8.1     Chemistry:  Recent Labs     21  0437     --  138   K 4.6  --  4.9     --  100   CO2 21  --  29   GLUCOSE 162*  --  145*   BUN 19  --  23*   CREATININE 1.00  --  1.03   ANIONGAP 15  --  9   LABGLOM >60  --  >60   GFRAA >60  --  >60   CALCIUM 9.2  --  8.7   PROBNP 604*  --  497*   TROPHS 24* 27*  --      Recent Labs     21  1820 21  0037 21  0437 21  4693 21  1208 07/12/21  1622 07/12/21 2025 07/13/21  0724 07/13/21  1210   PROT 7.3  --  6.5  --   --   --   --   --   --    LABALBU 4.0  --  3.7  --   --   --   --   --   --    LABA1C  --  7.2*  --   --   --   --   --   --   --    AST 29  --  23  --   --   --   --   --   --    ALT 56*  --  43*  --   --   --   --   --   --    ALKPHOS 118  --  103  --   --   --   --   --   --    BILITOT 0.37  --  0.39  --   --   --   --   --   --    LIPASE 18  --   --   --   --   --   --   --   --    CHOL  --   --  162  --   --   --   --   --   --    HDL  --   --  40*  --   --   --   --   --   --    LDLCHOLESTEROL  --   --  78  --   --   --   --   --   --    CHOLHDLRATIO  --   --  4.1  --   --   --   --   --   --    TRIG  --   --  220*  --   --   --   --   --   --    VLDL  --   --  NOT REPORTED*  --   --   --   --   --   --    POCGLU  --   --   --  150* 169* 220* 289* 202* 197*     ABG:No results found for: POCPH, PHART, PH, POCPCO2, CRT3CJG, PCO2, POCPO2, PO2ART, PO2, POCHCO3, YED3DKH, HCO3, NBEA, PBEA, BEART, BE, THGBART, THB, MMH4VLQ, PRHM6CHL, V8PAFGEZ, O2SAT, FIO2  No results found for: SPECIAL  No results found for: CULTURE    Radiology:  XR CHEST (SINGLE VIEW FRONTAL)    Result Date: 7/12/2021  Cardiomegaly with worsening vascular congestion and perihilar edema. XR CHEST (2 VW)    Result Date: 7/11/2021  Cardiomegaly, mild central venous congestion       Physical Examination:     Physical Exam  Vitals and nursing note reviewed. Constitutional:       Appearance: He is obese. HENT:      Mouth/Throat:      Mouth: Mucous membranes are moist.   Eyes:      Extraocular Movements: Extraocular movements intact. Cardiovascular:      Rate and Rhythm: Normal rate and regular rhythm. Pulses: Normal pulses. Heart sounds: Normal heart sounds. Pulmonary:      Effort: Pulmonary effort is normal.      Breath sounds: Examination of the right-upper field reveals wheezing. Examination of the left-upper field reveals wheezing.  Examination of the right-middle field reveals wheezing. Examination of the left-middle field reveals wheezing. Examination of the right-lower field reveals decreased breath sounds. Decreased breath sounds and wheezing present. Abdominal:      General: Bowel sounds are normal.      Palpations: Abdomen is soft. Musculoskeletal:      Right lower le+ Edema present. Left lower le+ Edema present. Skin:     General: Skin is warm and dry. Capillary Refill: Capillary refill takes less than 2 seconds. Comments: Chronic redness to his feet bilaterally   Neurological:      Mental Status: He is alert and oriented to person, place, and time. Psychiatric:         Mood and Affect: Mood normal.         Thought Content: Thought content normal.         Assessment:     Hospital Problems         Last Modified POA    * (Principal) Hypertensive emergency 2021 Yes    Tobacco abuse 2021 Yes    Hyperlipidemia 2021 Yes    COPD (chronic obstructive pulmonary disease) ([facility] 2021 Yes    Atypical chest pain 2021 Yes    Noncompliance 2021 Yes    Grade I diastolic dysfunction 3/85/1165 Yes    Type 2 diabetes mellitus, without long-term current use of insulin ([facility] [address] 2021 Yes    Hypertensive urgency 2021 Yes          Plan:     1. Hypertensive urgency: Nitro drip changed to Cardene drip. Titrate to keep SBP in 140-160 range. Start Catapres patch 0.1 mg. Daily BMP. Place electrolytes as needed. Monitor renal function. Monitor intake and output. Cardiology consulted  2. Tobacco abuse: Patient counseled to stop smoking as he smokes up to 2 packs/day at this time. Continue to reinforce smoking cessation throughout hospital stay. 3. Hyperlipidemia: Continue statin  4. Atypical chest pain: Continuous telemetry. Patient reports chest pressure has resolved at this time  5.  Noncompliance: Case management to assist with discharge plan and help arrange assistance for obtaining needed prescriptions  6. Grade 1 diastolic dysfunction: Lasix 40 mg daily. Continue hydrochlorothiazide. BMP daily. Replace electrolytes as needed. Cardiology consulted  7. Diabetes: Monitor blood glucose before meals and at bedtime. Sliding scale insulin as ordered. Consider starting oral antidiabetic agent and encourage weight loss. Carb controlled diet. Further diabetic management as outpatient per PCP.   8. Copd; continue solumedrol     CASIMIRO Bustamante - CNP  7/13/2021  12:37 PM

## 2021-07-13 NOTE — PROGRESS NOTES
RT in to see pt regarding Aerosol therapy . Pt currently on 02 per NC at 4.5lpm , Sat noted at 94% . Bipap unit on stand-by at bedside . Bilateral breath sounds reveal expiratory wheezes, congested cough observed . Mdi provided as well as rinse following tx .

## 2021-07-13 NOTE — PROGRESS NOTES
RT in for routine Aerosol therapy . Pt on02 per cannula at 4.5 lpm , will attempt to wean Fio2  to 3.5 lpm . Breath sounds diminished with a few expiratory wheezes , congested cough present .

## 2021-07-13 NOTE — PROGRESS NOTES
Pt states he fills all scripts at [facility] and [facility]. Medication list updated to reflect those medications filled at [address] [facility] [address] states the patient has not filled prescriptions since June of last year. Medications filled since March of this year are on printout in patient chart.     Include    Carvedilol 25 mg bid  Terbinafine 250 mg daily  Duloxetine Hcl 60 mg daily  Atorvastatin 40 mg daily  Irbesartan 300 mg daily  Levothyroxine 25 mg daily  Bupropion Hcl Xl 300mg daily

## 2021-07-13 NOTE — CONSULTS
PULMONARY & CRITICAL CARE MEDICINE CONSULT NOTE     Patient:  Lazaro Story  MRN: [id number]  Admit date: 7/11/2021  Primary Care Physician: Jairo Schumacher DO  Consulting Physician: Mylene Hood MD  CODE Status: Full Code  LOS: 1     SUBJECTIVE     CHIEF COMPLAINT/REASON FOR CONSULT: COPD/acute respiratory failure    HISTORY OF PRESENT ILLNESS:  The patient is a 44 y.o. male with past medical history of hypertension, hyperlipidemia, morbid obesity and COPD. He has more than 30-pack-year smoking history, continues to smoke about 1 to 1-1/2 pack/day. He is admitted with worsening shortness of breath and wheezing. Reports cough and sputum. Denies any chest pain. He has not seen any pulmonologist as an outpatient. Has been albuterol inhaler and nebulizer at home. Denies any previous use of oxygen. Reports history of sleep apnea, however compliance with CPAP was reported to be suboptimal.  Denies any cardiac history. Does seem to have chronic alcohol abuse/dependency. He has not been immunized with Covid. His rapid Covid test was negative. Chest x-ray showed bilateral pulmonary congestion. He is receiving azithromycin. He is allergic to penicillins. He is also receiving IV Solu-Medrol and Lasix. Echo pending. PAST MEDICAL HISTORY:        Diagnosis Date    Arthritis     COPD (chronic obstructive pulmonary disease) ([address]     Hyperlipidemia     Hypertension      PAST SURGICAL HISTORY:        Procedure Laterality Date    ANKLE SURGERY       FAMILY HISTORY:       Problem Relation Age of Onset    Other Mother     Heart Attack Father     Other Brother      SOCIAL HISTORY:   TOBACCO:   reports that he has been smoking cigarettes. He has a 30.00 pack-year smoking history. He has never used smokeless tobacco.  ETOH:  reports current alcohol use of about 13.0 - 14.0 standard drinks of alcohol per week. DRUGS: reports no history of drug use.     ALLERGIES:    Allergies   Allergen Reactions    Pcn [Penicillins] Anaphylaxis     From childhood         HOME MEDICATIONS:  Prior to Admission medications    Medication Sig Start Date End Date Taking? Authorizing Provider   montelukast (SINGULAIR) 10 MG tablet Take 10 mg by mouth nightly   Yes Historical Provider, MD   buPROPion (WELLBUTRIN XL) 300 MG extended release tablet Take 300 mg by mouth every morning   Yes Historical Provider, MD   empagliflozin (JARDIANCE) 10 MG tablet Take 10 mg by mouth daily   Yes Historical Provider, MD   Semaglutide 3 MG TABS Take 3 mg by mouth   Yes Historical Provider, MD   empagliflozin (JARDIANCE) 10 MG tablet Take 10 mg by mouth daily Jardiance   Yes Historical Provider, MD   ALBUTEROL IN Inhale into the lungs every 4 hours as needed    Yes Historical Provider, MD   atorvastatin (LIPITOR) 40 MG tablet Take 40 mg by mouth daily   Yes Historical Provider, MD   levothyroxine (SYNTHROID) 25 MCG tablet Take 25 mcg by mouth Daily   Yes Historical Provider, MD   DULoxetine (CYMBALTA) 60 MG extended release capsule Take 60 mg by mouth daily    Historical Provider, MD   cariprazine hcl (VRAYLAR) 1.5 MG capsule Take 1.5 mg by mouth daily    Historical Provider, MD     IMMUNIZATIONS:    There is no immunization history on file for this patient. REVIEW OF SYSTEMS:  Review of Systems   Constitutional: Positive for fatigue. Negative for appetite change, chills, fever and unexpected weight change. HENT: Negative for congestion, postnasal drip, sore throat and trouble swallowing. Eyes: Negative for visual disturbance. Respiratory: Positive for cough, shortness of breath and wheezing. Cardiovascular: Negative for chest pain, palpitations and leg swelling. Gastrointestinal: Negative for abdominal pain, constipation, diarrhea, nausea and vomiting. Genitourinary: Negative for difficulty urinating, dysuria and frequency. Musculoskeletal: Negative for arthralgias and joint swelling. Skin: Negative for rash.    Allergic/Immunologic: Negative for immunocompromised state. Neurological: Positive for weakness. Negative for dizziness, speech difficulty and headaches. Hematological: Negative for adenopathy. Psychiatric/Behavioral: Negative for behavioral problems and sleep disturbance.        OBJECTIVE     VITAL SIGNS:   LAST:  BP (!) 147/93   Pulse 78   Temp 98 °F (36.7 °C) (Oral)   Resp 22   Ht 6' (1.829 m)   Wt (!) 316 lb 11.2 oz (143.7 kg)   SpO2 95%   BMI 42.95 kg/m²   8-24 HR RANGE:  TEMP Temp  Av °F (36.7 °C)  Min: 97.4 °F (36.3 °C)  Max: 98.4 °F (32.2 °C)   BP Systolic (94VTH), TAB:419 , Min:117 , TGJ:989      Diastolic (38LPB), HAH:12, Min:71, Max:152     PULSE Pulse  Av.3  Min: 54  Max: 108   RR Resp  Av.4  Min: 12  Max: 25   O2 SAT SpO2  Av.5 %  Min: 88 %  Max: 95 %   OXYGEN DELIVERY O2 Flow Rate (L/min)  Av L/min  Min: 6 L/min  Max: 6 L/min        SYSTEMIC EXAMINATION:   General appearance -mild to moderate respiratory distress  Mental status - awake & alert, follows commands  Eyes - pupils equal and reactive, sclera anicteric  Mouth - mucous membranes moist  Neck - supple, no significant adenopathy  Chest -bilateral diffuse end expiratory wheezing  Heart - normal rate, regular rhythm, normal S1, S2, no murmurs, rubs, clicks or gallops  Abdomen - soft, nontender, nondistended, no masses or organomegaly  Neurological - non-focal  Extremities - peripheral pulses normal, 1+ pedal edema, no clubbing or cyanosis  Skin - normal coloration and turgor, no rashes, no suspicious skin lesions noted     DATA REVIEW     Medications: Current Inpatient  Scheduled Meds:   ipratropium-albuterol  1 ampule Inhalation Q4H WA    furosemide  40 mg Intravenous BID    methylPREDNISolone  40 mg Intravenous Q8H    azithromycin  500 mg Oral Daily    budesonide-formoterol  2 puff Inhalation BID    atorvastatin  40 mg Oral Daily    buPROPion  300 mg Oral QAM    [Held by provider] cariprazine hcl  1.5 mg Oral Daily    6. 5   LABALBU 4.0 3.7   ALT 56* 43*   AST 29 23   ALKPHOS 118 103   BILITOT 0.37 0.39     Coagulation Profile:   No results for input(s): INR, PROTIME, APTT in the last 72 hours. D-Dimer:  No results for input(s): DDIMER in the last 72 hours. Lactic Acid:  No results for input(s): LACTA in the last 72 hours. Cardiac Enzymes:  Recent Labs     07/12/21  0106 07/12/21  0455   TROPONINI 0.03 0.02     BNP/ProBNP:   Recent Labs     07/11/21  1820 07/12/21  0437   PROBNP 604* 497*     Triglycerides:  Recent Labs     07/12/21  0437   TRIG 220*        Microbiology:  Urine Culture:  No components found for: CURINE  Blood Culture:  No components found for: CBLOOD, CFUNGUSBL  Sputum Culture:  No components found for: CSPUTUM  No results for input(s): SPECDESC, SPECIAL, CULTURE, STATUS, ORG, CDIFFTOXPCR, CAMPYLOBPCR, SALMONELLAPC, SHIGAPCR, SHIGELLAPCR, MPNEUG, MPNEUM, LACTOQL in the last 72 hours. No results for input(s): SPUTUM, SPECIAL, CULTURE, STATUS, ORG, CDIFFTOXPCR, MPNEUM, MPNEUG in the last 72 hours. Invalid input(s): Luly Gaston, [facility],  [facility]       Radiology Reports:  XR CHEST (SINGLE VIEW FRONTAL)   Final Result   Cardiomegaly with worsening vascular congestion and perihilar edema. XR CHEST (2 VW)   Final Result   Cardiomegaly, mild central venous congestion              Echocardiogram:   Results for orders placed during the hospital encounter of 07/11/21    ECHO Complete 2D W Doppler W Color    Narrative  [address]    Summary  Normal left ventricular size with normal hyperdynamic function. EF 60-65%. Moderate to severe concentric left ventricular hypertrophy. Grade I (mild) left ventricular diastolic dysfunction. Posterior mitral annular calcification. No significant valvular regurgitation or stenosis seen.        ASSESSMENT AND PLAN     Assessment:    // Acute hypoxic respiratory failure  // Acute exacerbation of chronic obstructive pulmonary disease  // COPD, severity to be determined  // Bilateral interstitial infiltrates  // Morbid obesity  // Obstructive sleep apnea, noncompliant to CPAP  // Tobacco abuse  // Alcohol abuse/dependency    Plan:    I personally interviewed/examined the patient; reviewed interval history, interpreted all available radiographic and laboratory data at the time of service. Patient is hemodynamically stable and is currently saturating well on O2 Flow Rate (L/min)  Av L/min  Min: 6 L/min  Max: 6 L/min  Continue supplemental oxygen to keep oxygen saturation >90%  We will recommend continuing nocturnal as needed BiPAP  Encourage incentive spirometry  Continue pulmonary toilet, aspiration precautions and bronchodilators  Added Symbicort  Continue diuresis with Lasix  Monitor I/O, electrolytes with a goal of even/negative fluid balance  Tolerating oral diet  Stress ulcer prophylaxis  Chemical DVT prophylaxis  Antimicrobials reviewed; continue azithromycin, consider switching to Levaquin  Continue IV Solu-Medrol at current dose  Physical/occupational therapy    We will continue to follow. I would like to thank you for allowing me to participate in the care of this patient. Please feel free to call with any further questions or concerns. Joseluis Cortez MD  Pulmonary and Critical Care Medicine           2021     Please note that this chart was generated using voice recognition Dragon dictation software. Although every effort was made to ensure the accuracy of this automated transcription, some errors in transcription may have occurred.

## 2021-07-13 NOTE — PLAN OF CARE
resources available to assist in meeting health care needs will improve  7/13/2021 0317 by Lucian Phillips RN  Outcome: Ongoing     Problem: Physical Regulation:  Goal: Complications related to the disease process, condition or treatment will be avoided or minimized  Description: Complications related to the disease process, condition or treatment will be avoided or minimized  7/13/2021 0317 by Lucian Phillips RN  Outcome: Ongoing     Problem: Skin Integrity:  Goal: Will show no infection signs and symptoms  Description: Will show no infection signs and symptoms  Outcome: Ongoing  Goal: Absence of new skin breakdown  Description: Absence of new skin breakdown  Outcome: Ongoing

## 2021-07-14 ENCOUNTER — APPOINTMENT (OUTPATIENT)
Dept: NUCLEAR MEDICINE | Age: 51
DRG: 194 | End: 2021-07-14
Payer: MEDICAID

## 2021-07-14 LAB
ABSOLUTE EOS #: 0 K/UL (ref 0–0.4)
ABSOLUTE IMMATURE GRANULOCYTE: ABNORMAL K/UL (ref 0–0.3)
ABSOLUTE LYMPH #: 1 K/UL (ref 1–4.8)
ABSOLUTE MONO #: 0.8 K/UL (ref 0.1–1.2)
ANION GAP SERPL CALCULATED.3IONS-SCNC: 13 MMOL/L (ref 9–17)
BASOPHILS # BLD: 0 % (ref 0–2)
BASOPHILS ABSOLUTE: 0.1 K/UL (ref 0–0.2)
BUN BLDV-MCNC: 30 MG/DL (ref 6–20)
BUN/CREAT BLD: ABNORMAL (ref 9–20)
CALCIUM SERPL-MCNC: 9 MG/DL (ref 8.6–10.4)
CHLORIDE BLD-SCNC: 93 MMOL/L (ref 98–107)
CO2: 25 MMOL/L (ref 20–31)
CREAT SERPL-MCNC: 0.92 MG/DL (ref 0.7–1.2)
DIFFERENTIAL TYPE: ABNORMAL
EOSINOPHILS RELATIVE PERCENT: 0 % (ref 1–4)
GFR AFRICAN AMERICAN: >60 ML/MIN
GFR NON-AFRICAN AMERICAN: >60 ML/MIN
GFR SERPL CREATININE-BSD FRML MDRD: ABNORMAL ML/MIN/{1.73_M2}
GFR SERPL CREATININE-BSD FRML MDRD: ABNORMAL ML/MIN/{1.73_M2}
GLUCOSE BLD-MCNC: 149 MG/DL (ref 75–110)
GLUCOSE BLD-MCNC: 158 MG/DL (ref 75–110)
GLUCOSE BLD-MCNC: 178 MG/DL (ref 70–99)
GLUCOSE BLD-MCNC: 209 MG/DL (ref 75–110)
GLUCOSE BLD-MCNC: 226 MG/DL (ref 75–110)
HCT VFR BLD CALC: 53.3 % (ref 41–53)
HEMOGLOBIN: 17.1 G/DL (ref 13.5–17.5)
IMMATURE GRANULOCYTES: ABNORMAL %
LYMPHOCYTES # BLD: 6 % (ref 24–44)
MCH RBC QN AUTO: 30 PG (ref 26–34)
MCHC RBC AUTO-ENTMCNC: 32.1 G/DL (ref 31–37)
MCV RBC AUTO: 93.2 FL (ref 80–100)
MONOCYTES # BLD: 5 % (ref 2–11)
NRBC AUTOMATED: ABNORMAL PER 100 WBC
PDW BLD-RTO: 16.3 % (ref 12.5–15.4)
PLATELET # BLD: 230 K/UL (ref 140–450)
PLATELET ESTIMATE: ABNORMAL
PMV BLD AUTO: 8.4 FL (ref 6–12)
POTASSIUM SERPL-SCNC: 4.3 MMOL/L (ref 3.7–5.3)
RBC # BLD: 5.72 M/UL (ref 4.5–5.9)
RBC # BLD: ABNORMAL 10*6/UL
SEG NEUTROPHILS: 89 % (ref 36–66)
SEGMENTED NEUTROPHILS ABSOLUTE COUNT: 16.1 K/UL (ref 1.8–7.7)
SODIUM BLD-SCNC: 131 MMOL/L (ref 135–144)
WBC # BLD: 18 K/UL (ref 3.5–11)
WBC # BLD: ABNORMAL 10*3/UL

## 2021-07-14 PROCEDURE — 6370000000 HC RX 637 (ALT 250 FOR IP): Performed by: INTERNAL MEDICINE

## 2021-07-14 PROCEDURE — 94660 CPAP INITIATION&MGMT: CPT

## 2021-07-14 PROCEDURE — 82947 ASSAY GLUCOSE BLOOD QUANT: CPT

## 2021-07-14 PROCEDURE — 6370000000 HC RX 637 (ALT 250 FOR IP): Performed by: NURSE PRACTITIONER

## 2021-07-14 PROCEDURE — 6360000002 HC RX W HCPCS: Performed by: NURSE PRACTITIONER

## 2021-07-14 PROCEDURE — 99233 SBSQ HOSP IP/OBS HIGH 50: CPT | Performed by: INTERNAL MEDICINE

## 2021-07-14 PROCEDURE — 2580000003 HC RX 258: Performed by: NURSE PRACTITIONER

## 2021-07-14 PROCEDURE — 94761 N-INVAS EAR/PLS OXIMETRY MLT: CPT

## 2021-07-14 PROCEDURE — 85025 COMPLETE CBC W/AUTO DIFF WBC: CPT

## 2021-07-14 PROCEDURE — 80048 BASIC METABOLIC PNL TOTAL CA: CPT

## 2021-07-14 PROCEDURE — A9500 TC99M SESTAMIBI: HCPCS | Performed by: NURSE PRACTITIONER

## 2021-07-14 PROCEDURE — 2000000000 HC ICU R&B

## 2021-07-14 PROCEDURE — 3430000000 HC RX DIAGNOSTIC RADIOPHARMACEUTICAL: Performed by: NURSE PRACTITIONER

## 2021-07-14 PROCEDURE — 94640 AIRWAY INHALATION TREATMENT: CPT

## 2021-07-14 PROCEDURE — 78452 HT MUSCLE IMAGE SPECT MULT: CPT

## 2021-07-14 PROCEDURE — 99232 SBSQ HOSP IP/OBS MODERATE 35: CPT | Performed by: INTERNAL MEDICINE

## 2021-07-14 PROCEDURE — 2700000000 HC OXYGEN THERAPY PER DAY

## 2021-07-14 PROCEDURE — 36415 COLL VENOUS BLD VENIPUNCTURE: CPT

## 2021-07-14 RX ORDER — SODIUM CHLORIDE 0.9 % (FLUSH) 0.9 %
10 SYRINGE (ML) INJECTION ONCE
Status: COMPLETED | OUTPATIENT
Start: 2021-07-14 | End: 2021-07-14

## 2021-07-14 RX ORDER — CARVEDILOL 12.5 MG/1
25 TABLET ORAL 2 TIMES DAILY WITH MEALS
Status: DISCONTINUED | OUTPATIENT
Start: 2021-07-14 | End: 2021-07-15 | Stop reason: HOSPADM

## 2021-07-14 RX ORDER — AMLODIPINE BESYLATE 5 MG/1
5 TABLET ORAL DAILY
Status: DISCONTINUED | OUTPATIENT
Start: 2021-07-14 | End: 2021-07-15

## 2021-07-14 RX ORDER — CARVEDILOL 12.5 MG/1
25 TABLET ORAL 2 TIMES DAILY WITH MEALS
Status: DISCONTINUED | OUTPATIENT
Start: 2021-07-14 | End: 2021-07-14

## 2021-07-14 RX ADMIN — SODIUM CHLORIDE, PRESERVATIVE FREE 10 ML: 5 INJECTION INTRAVENOUS at 20:18

## 2021-07-14 RX ADMIN — AMLODIPINE BESYLATE 5 MG: 5 TABLET ORAL at 10:34

## 2021-07-14 RX ADMIN — CARVEDILOL 25 MG: 12.5 TABLET, FILM COATED ORAL at 17:26

## 2021-07-14 RX ADMIN — INSULIN LISPRO 2 UNITS: 100 INJECTION, SOLUTION INTRAVENOUS; SUBCUTANEOUS at 20:26

## 2021-07-14 RX ADMIN — ENOXAPARIN SODIUM 40 MG: 100 INJECTION SUBCUTANEOUS at 08:32

## 2021-07-14 RX ADMIN — LOSARTAN POTASSIUM 100 MG: 50 TABLET, FILM COATED ORAL at 08:31

## 2021-07-14 RX ADMIN — METOPROLOL TARTRATE 75 MG: 25 TABLET, FILM COATED ORAL at 08:31

## 2021-07-14 RX ADMIN — IPRATROPIUM BROMIDE AND ALBUTEROL SULFATE 1 AMPULE: .5; 3 SOLUTION RESPIRATORY (INHALATION) at 08:27

## 2021-07-14 RX ADMIN — CLONIDINE HYDROCHLORIDE 0.1 MG: 0.1 TABLET ORAL at 20:18

## 2021-07-14 RX ADMIN — MONTELUKAST SODIUM 10 MG: 10 TABLET, FILM COATED ORAL at 20:18

## 2021-07-14 RX ADMIN — HYDRALAZINE HYDROCHLORIDE 10 MG: 20 INJECTION INTRAMUSCULAR; INTRAVENOUS at 05:08

## 2021-07-14 RX ADMIN — LEVOTHYROXINE SODIUM 25 MCG: 0.03 TABLET ORAL at 05:29

## 2021-07-14 RX ADMIN — SODIUM CHLORIDE, PRESERVATIVE FREE 10 ML: 5 INJECTION INTRAVENOUS at 08:32

## 2021-07-14 RX ADMIN — BUDESONIDE AND FORMOTEROL FUMARATE DIHYDRATE 2 PUFF: 160; 4.5 AEROSOL RESPIRATORY (INHALATION) at 20:06

## 2021-07-14 RX ADMIN — INSULIN LISPRO 4 UNITS: 100 INJECTION, SOLUTION INTRAVENOUS; SUBCUTANEOUS at 17:15

## 2021-07-14 RX ADMIN — IPRATROPIUM BROMIDE AND ALBUTEROL SULFATE 1 AMPULE: .5; 3 SOLUTION RESPIRATORY (INHALATION) at 20:06

## 2021-07-14 RX ADMIN — SODIUM CHLORIDE, PRESERVATIVE FREE 10 ML: 5 INJECTION INTRAVENOUS at 10:28

## 2021-07-14 RX ADMIN — DULOXETINE 60 MG: 30 CAPSULE, DELAYED RELEASE ORAL at 08:31

## 2021-07-14 RX ADMIN — FUROSEMIDE 40 MG: 10 INJECTION, SOLUTION INTRAMUSCULAR; INTRAVENOUS at 08:32

## 2021-07-14 RX ADMIN — BUDESONIDE AND FORMOTEROL FUMARATE DIHYDRATE 2 PUFF: 160; 4.5 AEROSOL RESPIRATORY (INHALATION) at 08:30

## 2021-07-14 RX ADMIN — INSULIN LISPRO 2 UNITS: 100 INJECTION, SOLUTION INTRAVENOUS; SUBCUTANEOUS at 12:31

## 2021-07-14 RX ADMIN — AZITHROMYCIN MONOHYDRATE 500 MG: 250 TABLET ORAL at 08:31

## 2021-07-14 RX ADMIN — BUPROPION HYDROCHLORIDE 300 MG: 150 TABLET, FILM COATED, EXTENDED RELEASE ORAL at 08:32

## 2021-07-14 RX ADMIN — HYDROCHLOROTHIAZIDE 25 MG: 25 TABLET ORAL at 08:32

## 2021-07-14 RX ADMIN — IPRATROPIUM BROMIDE AND ALBUTEROL SULFATE 1 AMPULE: .5; 3 SOLUTION RESPIRATORY (INHALATION) at 12:55

## 2021-07-14 RX ADMIN — TETRAKIS(2-METHOXYISOBUTYLISOCYANIDE)COPPER(I) TETRAFLUOROBORATE 36 MILLICURIE: 1 INJECTION, POWDER, LYOPHILIZED, FOR SOLUTION INTRAVENOUS at 10:28

## 2021-07-14 RX ADMIN — PREDNISONE 40 MG: 20 TABLET ORAL at 08:31

## 2021-07-14 RX ADMIN — ATORVASTATIN CALCIUM 40 MG: 40 TABLET, FILM COATED ORAL at 08:32

## 2021-07-14 RX ADMIN — CLONIDINE HYDROCHLORIDE 0.1 MG: 0.1 TABLET ORAL at 08:32

## 2021-07-14 RX ADMIN — INSULIN LISPRO 2 UNITS: 100 INJECTION, SOLUTION INTRAVENOUS; SUBCUTANEOUS at 08:32

## 2021-07-14 RX ADMIN — IPRATROPIUM BROMIDE AND ALBUTEROL SULFATE 1 AMPULE: .5; 3 SOLUTION RESPIRATORY (INHALATION) at 16:19

## 2021-07-14 ASSESSMENT — ENCOUNTER SYMPTOMS
SHORTNESS OF BREATH: 0
VOMITING: 0
CHEST TIGHTNESS: 0
NAUSEA: 0
DIARRHEA: 0
COLOR CHANGE: 1
WHEEZING: 0
CONSTIPATION: 0
ABDOMINAL PAIN: 0
BLOOD IN STOOL: 0
COUGH: 0

## 2021-07-14 ASSESSMENT — PAIN SCALES - GENERAL
PAINLEVEL_OUTOF10: 0

## 2021-07-14 NOTE — PROGRESS NOTES
[facility]  Office: [address], DO, Elvira Mckeon, DO, Greg Gonzales, DO, Josy Mejias Blood, DO, Melissa Prater MD, Corin Rainey MD, Robin Anderson MD, Caty Sol MD, Layo Tom MD, Caty Gaines MD, Mayo Gonzales MD, Shawnee Cruz, DO, Franciso Schaumann, MD, Shameka Greene, DO, Alexey Pa MD,  Conchita Barr DO, Bety Leslie MD, Denman Najjar, MD, Chidi Gasca MD, Miryam Escobar MD, Jeana Baker MD, Henry Mann MD, Olman Richardson, [facility], [facility], CNP, Azar Leggett, CNP, Chyna Capps, CNS, Jad Light, CNP, Nehemias Mcneal, CNP, Fatemeh Saha, CNP, Ella Quiros, CNP, Shane Goldsmith CNP, Abhinav Thompson PA-C, David Barnhart, [facility], Kate Kaiser, CNP, Jaz Edwards, CNP, Joan Lyons, CNP, Willard Thompson, CNP, Cristy Felder CNP, Shay Strange CNP, Pavan Lovell, CNP, Kristyn Hall, [id number]    Progress Note    7/14/2021    1:47 PM    Name:   Ajay Capellan  MRN:     [id number]     Acct:      [de-identified]   Room:   [address] [place] [address] Day:  3  Admit Date:  7/11/2021  5:29 PM    PCP:   Aquiles Segal DO  Code Status:  Full Code    Subjective:     C/C:   Chief Complaint   Patient presents with    Shortness of Breath     ongoing    Chest Pain     Interval History Status: improved. Patient states he feels much better today, blood pressure slightly better controlled. Stress test still pending, patient otherwise asymptomatic now. No headaches, no dizziness, no double vision. No events overnight    Brief History:     Per previous documentation    Ajay Capellan is a 44 y.o.  Non-/non  male who presents with Shortness of Breath (ongoing) and Chest Pain   and is admitted to the hospital for the management of Hypertensive emergency.     Patient reports that he has a history of high blood pressure and has not been able to comply with his antihypertensive regimen due to he has no health insurance at this time. He says that for the past month he has had chest pressure, abdomen has \"felt full\", and has had swelling in his hands and feet. He has also had some shortness of breath with activity.     While in ED, blood pressure was noted to be 161/112 with a heart rate of 101. His SPO2 was 96%. proBNP was 604, troponin 24, UA revealed 3+ glucose. He was started on a nitroglycerin infusion and admitted to the inpatient unit for further observation and management of hypertensive urgency.     This morning his blood pressure was more controlled with SBP in the 140s, however the nitro drip was up to 130 mics. Nitro drip was then switched to a Cardene drip to maintain blood pressure control. This morning he was also requiring BiPAP due to he was dropping his pulse ox while on nasal cannula. Pulmonary was consulted. Patient says he does have a known history of sleep apnea, but he only wears his CPAP intermittently.     A1c noted to be 7.2. Patient receiving sliding scale insulin before meals and at bedtime. Patient has no reported history of diabetes. Start Catapres patch   Decrease Lasix to once daily   cardiology consulted    Review of Systems:     Review of Systems   Constitutional: Negative for chills, diaphoresis and fever. HENT: Negative for congestion. Eyes: Negative for visual disturbance. Respiratory: Negative for cough, chest tightness, shortness of breath and wheezing. Cardiovascular: Positive for leg swelling. Negative for chest pain and palpitations. Gastrointestinal: Negative for abdominal pain, blood in stool, constipation, diarrhea, nausea and vomiting. Genitourinary: Negative for difficulty urinating. Skin: Positive for color change. Neurological: Negative for dizziness, weakness, light-headedness, numbness and headaches. All other systems reviewed and are negative. Medications: Allergies:     Allergies   Allergen Reactions    Pcn [Penicillins] week. He reports that he does not use drugs. Family History:   Family History   Problem Relation Age of Onset    Other Mother     Heart Attack Father     Other Brother        Vitals:  BP (!) 145/101   Pulse 65   Temp 97.5 °F (36.4 °C) (Oral)   Resp 18   Ht 6' (1.829 m)   Wt (!) 303 lb 9.6 oz (137.7 kg)   SpO2 92%   BMI 41.18 kg/m²   Temp (24hrs), Av.9 °F (36.6 °C), Min:97.5 °F (36.4 °C), Max:98.7 °F (37.1 °C)    Recent Labs     21  1210 21  1656 21  1158   POCGLU 197* 205* 264* 158*       I/O (24Hr): Intake/Output Summary (Last 24 hours) at 2021 1347  Last data filed at 2021 1000  Gross per 24 hour   Intake 220 ml   Output 3425 ml   Net -3205 ml       Labs:  Hematology:  Recent Labs     21  1241 21  0558   WBC 14.4* 16.4* 18.0*   RBC 5.19 5.67 5.72   HGB 15.6 17.1 17.1   HCT 48.6 52.9 53.3*   MCV 93.7 93.3 93.2   MCH 30.1 30.2 30.0   MCHC 32.1 32.4 32.1   RDW 16.2* 16.1* 16.3*    230 230   MPV 8.1 8.1 8.4     Chemistry:  Recent Labs     21  18221  18221  0437 21  1241 21  0558      < >  --  138 131* 131*   K 4.6   < >  --  4.9 4.4 4.3      < >  --  100 91* 93*   CO2 21   < >  --  29 25 25   GLUCOSE 162*   < >  --  145* 243* 178*   BUN 19   < >  --  23* 34* 30*   CREATININE 1.00   < >  --  1.03 1.16 0.92   MG  --   --   --   --  2.2  --    ANIONGAP 15   < >  --  9 15 13   LABGLOM >60   < >  --  >60 >60 >60   GFRAA >60   < >  --  >60 >60 >60   CALCIUM 9.2   < >  --  8.7 9.4 9.0   PROBNP 604*  --   --  497*  --   --    TROPHS 24*  --  27*  --   --   --     < > = values in this interval not displayed.      Recent Labs     21  1820 21  0029 21  0037 21  0437 21  0823 21  2025 21  0724 21  1210 21  1656 21  1958 21  1158   PROT 7.3  --   --  6.5  --   --   --   --   --   --   --    LABALBU 4.0  --   -- 3.7  --   --   --   --   --   --   --    LABA1C  --   --  7.2*  --   --   --   --   --   --   --   --    AST 29  --   --  23  --   --   --   --   --   --   --    ALT 56*  --   --  43*  --   --   --   --   --   --   --    ALKPHOS 118  --   --  103  --   --   --   --   --   --   --    BILITOT 0.37  --   --  0.39  --   --   --   --   --   --   --    LIPASE 18  --   --   --   --   --   --   --   --   --   --    CHOL  --   --   --  162  --   --   --   --   --   --   --    HDL  --   --   --  40*  --   --   --   --   --   --   --    LDLCHOLESTEROL  --   --   --  78  --   --   --   --   --   --   --    CHOLHDLRATIO  --   --   --  4.1  --   --   --   --   --   --   --    TRIG  --   --   --  220*  --   --   --   --   --   --   --    VLDL  --   --   --  NOT REPORTED*  --   --   --   --   --   --   --    POCGLU  --    < >  --   --    < > 289* 202* 197* 205* 264* 158*    < > = values in this interval not displayed. ABG:No results found for: POCPH, PHART, PH, POCPCO2, PIY0VUO, PCO2, POCPO2, PO2ART, PO2, POCHCO3, QHE4ZVO, HCO3, NBEA, PBEA, BEART, BE, THGBART, THB, BCJ4EJB, DXAL2XBX, K0IROONX, O2SAT, FIO2  No results found for: SPECIAL  No results found for: CULTURE    Radiology:  XR CHEST (SINGLE VIEW FRONTAL)    Result Date: 7/12/2021  Cardiomegaly with worsening vascular congestion and perihilar edema. XR CHEST (2 VW)    Result Date: 7/11/2021  Cardiomegaly, mild central venous congestion       Physical Examination:     Physical Exam  Vitals and nursing note reviewed. Constitutional:       Appearance: He is obese. HENT:      Mouth/Throat:      Mouth: Mucous membranes are moist.   Eyes:      Extraocular Movements: Extraocular movements intact. Cardiovascular:      Rate and Rhythm: Normal rate and regular rhythm. Pulses: Normal pulses. Heart sounds: Normal heart sounds. Pulmonary:      Effort: Pulmonary effort is normal.      Breath sounds: Examination of the right-upper field reveals wheezing. Examination of the left-upper field reveals wheezing. Examination of the right-middle field reveals wheezing. Examination of the left-middle field reveals wheezing. Examination of the right-lower field reveals decreased breath sounds. Decreased breath sounds and wheezing present. Abdominal:      General: Bowel sounds are normal.      Palpations: Abdomen is soft. Musculoskeletal:      Right lower le+ Edema present. Left lower le+ Edema present. Skin:     General: Skin is warm and dry. Capillary Refill: Capillary refill takes less than 2 seconds. Comments: Chronic redness to his feet bilaterally   Neurological:      Mental Status: He is alert and oriented to person, place, and time. Psychiatric:         Mood and Affect: Mood normal.         Thought Content: Thought content normal.         Assessment:     Hospital Problems         Last Modified POA    * (Principal) Hypertensive emergency 2021 Yes    Tobacco abuse 2021 Yes    Hyperlipidemia 2021 Yes    COPD (chronic obstructive pulmonary disease) ([facility] [address] 2021 Yes    Atypical chest pain 2021 Yes    Noncompliance 2021 Yes    Grade I diastolic dysfunction 3/91/0783 Yes    Type 2 diabetes mellitus, without long-term current use of insulin ([facility] [address] 2021 Yes    Hypertensive urgency 2021 Yes          Plan:     1. Hypertensive urgency: Cardiology consulted, blood pressure seems improved today. 2. Tobacco abuse: Patient counseled to stop smoking as he smokes up to 2 packs/day at this time. Continue to reinforce smoking cessation throughout hospital stay. 3. Hyperlipidemia: Continue statin  4. Atypical chest pain: Stress test is pending  5. Noncompliance: Case management to assist with discharge plan and help arrange assistance for obtaining needed prescriptions  6. Grade 1 diastolic dysfunction: Lasix 40 mg daily. Continue hydrochlorothiazide. BMP daily. Replace electrolytes as needed.   Cardiology consulted  7. Diabetes: Monitor blood glucose before meals and at bedtime. Sliding scale insulin as ordered. Consider starting oral antidiabetic agent and encourage weight loss. Carb controlled diet. Further diabetic management as outpatient per PCP. 8. Copd; continue solumedrol   9. Plan:  1. Nuclear stress test is pending results, cardiology is following  2. Blood pressure better managed today, Lopressor was changed to Coreg and Norvasc was added. Continuing ARB, clonidine hydrochlorothiazide. 3. Consider discontinuing clonidine before discharge  4.  Continue to monitor next 24 hours, patient possibly can be discharged tomorrow pending approval from cardiology    Norma Boucher DO  7/14/2021  1:47 PM

## 2021-07-14 NOTE — PROGRESS NOTES
PULMONARY & CRITICAL CARE MEDICINE PROGRESS NOTE     Patient:  Stephie Valencia  MRN: [id number]  Admit date: 2021  Primary Care Physician: Bev Tello DO  Consulting Physician: Reese Anderson DO  CODE Status: Full Code  LOS: 3     SUBJECTIVE     CHIEF COMPLAINT/REASON FOR INITIAL CONSULT: COPD exacerbation    BRIEF HOSPITAL COURSE:   The patient is a 44 y.o. male with past medical history of hypertension, hyperlipidemia, morbid obesity and COPD. He has more than 30-pack-year smoking history, continues to smoke about 1 to 1-1/2 pack/day. He is admitted with worsening shortness of breath and wheezing. Reports cough and sputum. Denies any chest pain. He has not seen any pulmonologist as an outpatient. Has been albuterol inhaler and nebulizer at home. Denies any previous use of oxygen. Reports history of sleep apnea, however compliance with CPAP was reported to be suboptimal.  Denies any cardiac history. Does seem to have chronic alcohol abuse/dependency. He has not been immunized with Covid. His rapid Covid test was negative. Chest x-ray showed bilateral pulmonary congestion. He is receiving azithromycin. He is allergic to penicillins. He is also receiving IV Solu-Medrol and Lasix. INTERVAL HISTORY:  21  Patient is currently on O2 Flow Rate (L/min)  Av.3 L/min  Min: 2 L/min  Max: 3 L/min  T-max is 98.7, white count is 18,000  Patient was on IV Solu-Medrol, switch to prednisone  Use BiPAP overnight, reluctant to use it during the day  Plan for stress test tomorrow    REVIEW OF SYSTEMS:  Review of Systems   Constitutional: Negative for appetite change, chills, fever and unexpected weight change. HENT: Negative for congestion, postnasal drip, sore throat and trouble swallowing. Eyes: Negative for visual disturbance. Respiratory: Positive for cough, chest tightness, shortness of breath and wheezing. Cardiovascular: Negative for chest pain, palpitations and leg swelling. Gastrointestinal: Negative for abdominal pain, constipation, diarrhea, nausea and vomiting. Genitourinary: Negative for difficulty urinating, dysuria and frequency. Musculoskeletal: Negative for arthralgias and joint swelling. Skin: Negative for rash. Allergic/Immunologic: Negative for immunocompromised state. Neurological: Positive for weakness. Negative for dizziness, speech difficulty and headaches. Hematological: Negative for adenopathy. Psychiatric/Behavioral: Negative for behavioral problems and sleep disturbance.      OBJECTIVE     VITAL SIGNS:   LAST:  BP (!) 141/99   Pulse 76   Temp 97.5 °F (36.4 °C) (Oral)   Resp 18   Ht 6' (1.829 m)   Wt (!) 303 lb 9.6 oz (137.7 kg)   SpO2 93%   BMI 41.18 kg/m²   8-24 HR RANGE:  TEMP Temp  Av.9 °F (36.6 °C)  Min: 97.5 °F (36.4 °C)  Max: 98.7 °F (34.1 °C)   BP Systolic (73RQJ), NOC:150 , Min:130 , ZCS:836      Diastolic (40HIP), TZB:590, Min:86, Max:131     PULSE Pulse  Av.3  Min: 64  Max: 87   RR Resp  Av  Min: 18  Max: 18   O2 SAT SpO2  Av.4 %  Min: 89 %  Max: 95 %   OXYGEN DELIVERY O2 Flow Rate (L/min)  Av.3 L/min  Min: 2 L/min  Max: 3 L/min        SYSTEMIC EXAMINATION:   General appearance -  mild to moderate respiratory distress, morbidly obese  Mental status - awake & alert, follows commands  Eyes - pupils equal and reactive, sclera anicteric  Mouth - mucous membranes moist  Neck - supple, no significant adenopathy  Chest - Breath sounds show occasional end expiratory wheezes  Heart - normal rate, regular rhythm, normal S1, S2, no murmurs, rubs, clicks or gallops  Abdomen - soft, nontender, nondistended, no masses or organomegaly  Neurological - non-focal  Extremities - peripheral pulses normal, no pedal edema, no clubbing or cyanosis  Skin - normal coloration and turgor, no rashes, no suspicious skin lesions noted     DATA REVIEW     Medications: Current Inpatient  Scheduled Meds:   amLODIPine  5 mg Oral Daily    carvedilol  25 mg Oral BID     furosemide  40 mg Intravenous Daily    predniSONE  40 mg Oral Daily    cloNIDine  0.1 mg Oral BID    insulin lispro  0-12 Units Subcutaneous TID     insulin lispro  0-6 Units Subcutaneous Nightly    ipratropium-albuterol  1 ampule Inhalation Q4H WA    azithromycin  500 mg Oral Daily    budesonide-formoterol  2 puff Inhalation BID    atorvastatin  40 mg Oral Daily    buPROPion  300 mg Oral QAM    [Held by provider] cariprazine hcl  1.5 mg Oral Daily    DULoxetine  60 mg Oral Daily    [Held by provider] empagliflozin  10 mg Oral Daily    hydroCHLOROthiazide  25 mg Oral Daily    losartan  100 mg Oral Daily    levothyroxine  25 mcg Oral Daily    montelukast  10 mg Oral Nightly    Semaglutide  3 mg Oral Daily    sodium chloride flush  5-40 mL Intravenous 2 times per day    enoxaparin  40 mg Subcutaneous Daily    sodium chloride flush  5-40 mL Intravenous 2 times per day    nicotine  1 patch Transdermal Daily     Continuous Infusions:   sodium chloride      sodium chloride 25 mL (07/11/21 2230)    dextrose         INPUT/OUTPUT:  In: 220 [P.O.:220]  Out: 3200 [Urine:3200]  Date 07/14/21 0000 - 07/14/21 2359   Shift 2088-8378 3519-3423 6718-0507 24 Hour Total   INTAKE   P.O.(mL/kg/hr) 220(0.2)   220   Shift Total(mL/kg) 220(1.6)   220(1.6)   OUTPUT   Urine(mL/kg/hr) 1100(1) 2100  3200   Shift Total(mL/kg) 1100(8) 3762(33.6)  3200(23.2)   Weight (kg) 137.7 137.7 137.7 137.7        LABS:  ABGs:   No results for input(s): POCPH, POCPCO2, POCPO2, POCHCO3, BNNR0WIA in the last 72 hours.   CBC:   Recent Labs     07/11/21  1820 07/12/21  0437 07/13/21  1241 07/14/21  0558   WBC 10.8 14.4* 16.4* 18.0*   HGB 17.3 15.6 17.1 17.1   HCT 53.5* 48.6 52.9 53.3*   MCV 93.8 93.7 93.3 93.2    237 230 230   LYMPHOPCT 5*  --  4* 6*   RBC 5.71 5.19 5.67 5.72   MCH 30.3 30.1 30.2 30.0   MCHC 32.4 32.1 32.4 32.1   RDW 16.4* 16.2* 16.1* 16.3*     CRP:   No results for input(s): CRP left ventricular size with normal hyperdynamic function. EF 60-65%. Moderate to severe concentric left ventricular hypertrophy. Grade I (mild) left ventricular diastolic dysfunction. Posterior mitral annular calcification. No significant valvular regurgitation or stenosis seen. ASSESSMENT AND PLAN     Assessment:    // Acute hypoxic respiratory failure  // Acute exacerbation of chronic obstructive pulmonary disease  // COPD, severity to be determined  // Bilateral interstitial infiltrates  // Hypertensive urgency  // Morbid obesity  // Obstructive sleep apnea, noncompliant to CPAP  // Tobacco abuse  // Alcohol abuse/dependency     Plan:     I personally interviewed/examined the patient; reviewed interval history, interpreted all available radiographic and laboratory data at the time of service.     Patient is hemodynamically stable and is currently saturating well on nasal cannula at 2 L/min  Continue supplemental oxygen to keep oxygen saturation >90%  We will recommend continuing nocturnal as needed BiPAP  Encourage incentive spirometry  Continue pulmonary toilet, aspiration precautions and bronchodilators (Symbicort/DuoNeb)  Continue diuresis with Lasix  Plan for Stress test tomorrow  Stress ulcer prophylaxis  Chemical DVT prophylaxis  Antimicrobials reviewed; continue azithromycin  Was on IV Solu-Medrol, switch to prednisone, will recommend discontinue prednisone after 5 days  Physical/occupational therapy     We will continue to follow. I would like to thank you for allowing me to participate in the care of this patient. Please feel free to call with any further questions or concerns. Therese Trimble MD  Pulmonary and Critical Care Medicine           7/14/2021, 3:39 PM    Please note that this chart was generated using voice recognition Dragon dictation software. Although every effort was made to ensure the accuracy of this automated transcription, some errors in transcription may have occurred.

## 2021-07-14 NOTE — CARE COORDINATION
[facility] ICU Quality Flow/Interdisciplinary Rounds Progress Note    Quality Flow Rounds held on July 14, 2021 at [address] Attending:  Bedside Nurse, , Nursing Unit Leadership, Dietary, Respiratory Therapy, Physical Therapy, Occupational Therapy and Spiritual Care/    Anticipated Discharge Date:  Expected Discharge Date: 07/13/21    Anticipated Discharge Disposition: home     Readmission Risk              Risk of Unplanned Readmission:  20           Discussed patient goal for the day, patient clinical progression, and barriers to discharge.   The following Goal(s) of the Day/Commitment(s) have been identified:  Activity Progression  as marciano - PT/OT and wean O2, referral to HELP dept - financial aid assist form given      Evie Campbell RN  July 14, 2021

## 2021-07-14 NOTE — PROGRESS NOTES
Pt completed four laps in the light with some increased work of breathing, but oxygen saturations maintained.

## 2021-07-14 NOTE — CONSULTS
[facility] Cardiology    Consult                        Today's Date: 7/14/2021  Patient Name: Keyana Prather  Date of admission: 7/11/2021  5:29 PM  Patient's age: 44 y.o., 1970  Admission Dx: Hypertensive emergency [I16.1]    Reason for Consult:  Cardiac evaluation    Requesting Physician: Isi Schilling MD    CHIEF COMPLAINT:  SOB, HTN     History Obtained From:  patient, electronic medical record    HISTORY OF PRESENT ILLNESS:      Keyana Prather is a 44 y.o. male current smoker with pertinent PMH of hypertension, hyperlipidemia, diabetes, COPD who presents to the ED via private auto with chest pain and shortness of breath. Patient reports that for the past 2 months he has been experiencing gradually worsening shortness of breath and bilateral lower extremity edema. He also notes that he has felt bloated and has noticed some weight gain. Patient reports that since last night he has been experiencing constant chest pressure. His shortness of breath and chest pressure do exacerbate with exertion. Denies history of cardiac disease. Does have a family history of cardiac disease though. Patient mentions that he has had high blood pressure ever since he was 15 and is always been on blood pressure medication. He takes this regularly and follows up with his PCP regularly though notes that his blood pressure is always elevated. Patient also reports that he was sent for medic prior to coming here and they advised admission, however patient did not want to stay so he left AMA. Patient returns after being prompted by his wife to return because the chest pain was not improving. Denies history of blood clots, clotting disorders, or malignancy. Denies recent trauma, surgery, or extended travel. Denies hemoptysis, calf pain, or unilateral leg swelling. No use of hormones. Denies any fever, chills, abdominal pain, vomiting, diarrhea, cough different from chronic cough, or any other concerns this time.     Pt seen and examined in the room after discussing with RN. Pt denies any CP but states that he has had some episodes of pain at home. He remains sob, in 02 per NC. Diuresing well on IV lasix. Pt states overall, he is feeling better. NSR     -880 ml since admission   Past Medical History:   has a past medical history of Arthritis, COPD (chronic obstructive pulmonary disease) ([address] Hyperlipidemia, and Hypertension. Past Surgical History:   has a past surgical history that includes Ankle surgery. Home Medications:    Prior to Admission medications    Medication Sig Start Date End Date Taking?  Authorizing Provider   carvedilol (COREG) 25 MG tablet Take 25 mg by mouth 2 times daily   Yes Historical Provider, MD   terbinafine (LAMISIL) 250 MG tablet Take 250 mg by mouth daily   Yes Historical Provider, MD   irbesartan (AVAPRO) 300 MG tablet Take 300 mg by mouth daily   Yes Historical Provider, MD   montelukast (SINGULAIR) 10 MG tablet Take 10 mg by mouth nightly   Yes Historical Provider, MD   buPROPion (WELLBUTRIN XL) 300 MG extended release tablet Take 300 mg by mouth every morning   Yes Historical Provider, MD   empagliflozin (JARDIANCE) 10 MG tablet Take 10 mg by mouth daily   Yes Historical Provider, MD   Semaglutide 3 MG TABS Take 3 mg by mouth   Yes Historical Provider, MD   empagliflozin (JARDIANCE) 10 MG tablet Take 10 mg by mouth daily Jardiance   Yes Historical Provider, MD   ALBUTEROL IN Inhale into the lungs every 4 hours as needed    Yes Historical Provider, MD   atorvastatin (LIPITOR) 40 MG tablet Take 40 mg by mouth daily   Yes Historical Provider, MD   levothyroxine (SYNTHROID) 25 MCG tablet Take 25 mcg by mouth Daily   Yes Historical Provider, MD   DULoxetine (CYMBALTA) 60 MG extended release capsule Take 60 mg by mouth daily    Historical Provider, MD   cariprazine hcl (VRAYLAR) 1.5 MG capsule Take 1.5 mg by mouth daily    Historical Provider, MD       Allergies:  Pcn [penicillins]    Social Cardiovascular:  · Heart tones are crisp and normal. regular S1 and S2.  · Jugular venous pulsation Normal  · The carotid upstroke is normal in amplitude and contour without delay or bruit   Abdomen:   · soft  · Bowel sounds present  Extremities:  ·  No edema  Neurological:  · Alert and oriented. DATA:    Diagnostics:    EKG: NSR   ECHO: 7/11/21  Summary  Normal left ventricular size with normal hyperdynamic function. EF 60-65%. Moderate to severe concentric left ventricular hypertrophy. Grade I (mild) left ventricular diastolic dysfunction. Posterior mitral annular calcification. No significant valvular regurgitation or stenosis seen. Stress Test: 2017  1. No discrete perfusion abnormality to suggest myocardial   ischemia/infarction. 2. No wall motion abnormality.  Calculated ejection fraction of 48%. 3. Risk stratification: Intermediate, based solely upon ejection fraction of   48%.  Please see below for details. Notes concerning risk stratification:       Cardiac Angiography: none     Labs:     CBC:   Recent Labs     07/13/21  1241 07/14/21  0558   WBC 16.4* 18.0*   HGB 17.1 17.1   HCT 52.9 53.3*    230     BMP:   Recent Labs     07/13/21  1241 07/14/21  0558   * 131*   K 4.4 4.3   CO2 25 25   BUN 34* 30*   CREATININE 1.16 0.92   LABGLOM >60 >60   GLUCOSE 243* 178*     BNP: No results for input(s): BNP in the last 72 hours. PT/INR: No results for input(s): PROTIME, INR in the last 72 hours. APTT:No results for input(s): APTT in the last 72 hours.   CARDIAC ENZYMES:  Recent Labs     07/12/21  0106 07/12/21  0455   TROPONINI 0.03 0.02     FASTING LIPID PANEL:  Lab Results   Component Value Date    HDL 40 07/12/2021    TRIG 220 07/12/2021     LIVER PROFILE:  Recent Labs     07/11/21  1820 07/12/21  0437   AST 29 23   ALT 56* 43*   LABALBU 4.0 3.7       IMPRESSION:    Patient Active Problem List   Diagnosis    Tobacco abuse    Hyperlipidemia    COPD [facility] obstructive [facility] disease) [facility]    Atypical chest pain    Accelerated hypertension    Noncompliance    Hypertensive emergency    Grade I diastolic dysfunction    Type 2 diabetes mellitus, without long-term current use of insulin ([facility]    Hypertensive urgency       RECOMMENDATIONS:  1. HTN emergency. Remains elevated. Will switch lopressor to coreg for better BP control. Elevated diastolic, will add low dose Norvasc. Continue ARB, Clonidine and HCTZ. Clonidine may not be a good option for him if he is noncompliant, due to rebound HTN. 2. CHF. Improved, but remains fluid overload. Continue  IV lasix, Continue BB/ARB   3. Mildly elevated trops. Likely type II MI secondary to uncontrolled HTN.   ECHO reviewed. Will plan for stress test in the am. NPO after midnight    4. Rising WBC. Managed per Primary     Discussed with patient and Nurse. Silas Mott, [address] Cardiology Consult           [phone]      Attending Physician Statement  I have discussed the care of Ronel Shetty, including pertinent history and exam findings,  with the cardiology NP. I agree with the above assessment and management plan.          Hiral Cottrell MD, [facility]

## 2021-07-14 NOTE — PLAN OF CARE
Problem: Falls - Risk of:  Goal: Will remain free from falls  Description: Will remain free from falls  7/14/2021 1711 by Fernanda Hall RN  Outcome: Ongoing  7/14/2021 0512 by Farzaneh Armstrong  Outcome: Ongoing  Goal: Absence of physical injury  Description: Absence of physical injury  7/14/2021 1711 by Fernanda Hall RN  Outcome: Ongoing  7/14/2021 0512 by Farzaneh Armstrong  Outcome: Ongoing    Pt remains free from falls at this time. Bed/chair alarms in place to promote safety. Pt has been educated on how to utilize the call light effectively. Problem: Skin Integrity:  Goal: Will show no infection signs and symptoms  Description: Will show no infection signs and symptoms  7/14/2021 1711 by Fernanda Hall RN  Outcome: Ongoing  7/14/2021 0512 by Farzaneh Armstrong  Outcome: Ongoing  Goal: Absence of new skin breakdown  Description: Absence of new skin breakdown  7/14/2021 1711 by Fernanda Hall RN  Outcome: Ongoing  7/14/2021 0512 by Farzaneh Armstrong  Outcome: Ongoing    Pt skin is intact (see charting). Pt has been encouraged to turn every 2 hours to prevent skin breakdown. RN will continue to monitor.

## 2021-07-14 NOTE — PLAN OF CARE
Problem: Falls - Risk of:  Goal: Will remain free from falls  Description: Will remain free from falls  Outcome: Ongoing  Goal: Absence of physical injury  Description: Absence of physical injury  Outcome: Ongoing     Problem: OXYGENATION/RESPIRATORY FUNCTION  Goal: Patient will maintain patent airway  Outcome: Ongoing  Goal: Patient will achieve/maintain normal respiratory rate/effort  Description: Respiratory rate and effort will be within normal limits for the patient  Outcome: Ongoing     Problem: HEMODYNAMIC STATUS  Goal: Patient has stable vital signs and fluid balance  Outcome: Ongoing     Problem: FLUID AND ELECTROLYTE IMBALANCE  Goal: Fluid and electrolyte balance are achieved/maintained  Outcome: Ongoing     Problem: ACTIVITY INTOLERANCE/IMPAIRED MOBILITY  Goal: Mobility/activity is maintained at optimum level for patient  Outcome: Ongoing     Problem: Cardiac:  Goal: Ability to maintain vital signs within normal range will improve  Description: Ability to maintain vital signs within normal range will improve  Outcome: Ongoing  Goal: Cardiovascular alteration will improve  Description: Cardiovascular alteration will improve  Outcome: Ongoing     Problem: Health Behavior:  Goal: Will modify at least one risk factor affecting health status  Description: Will modify at least one risk factor affecting health status  Outcome: Ongoing  Goal: Identification of resources available to assist in meeting health care needs will improve  Description: Identification of resources available to assist in meeting health care needs will improve  Outcome: Ongoing     Problem: Physical Regulation:  Goal: Complications related to the disease process, condition or treatment will be avoided or minimized  Description: Complications related to the disease process, condition or treatment will be avoided or minimized  Outcome: Ongoing     Problem: Skin Integrity:  Goal: Will show no infection signs and symptoms  Description: Will show no infection signs and symptoms  Outcome: Ongoing  Goal: Absence of new skin breakdown  Description: Absence of new skin breakdown  Outcome: Ongoing     Problem: Respiratory:  Goal: Ability to maintain adequate ventilation will improve  Description: Ability to maintain adequate ventilation will improve  Outcome: Ongoing

## 2021-07-14 NOTE — PLAN OF CARE
Respiratory in to see patient. SpO2 93% on 3 lpm. Breath sounds diminished throughout. Taking bronchodilator and Symbicort mdi as ordered. States he wore the bipap 4 hours last night.

## 2021-07-15 ENCOUNTER — APPOINTMENT (OUTPATIENT)
Dept: ULTRASOUND IMAGING | Age: 51
DRG: 194 | End: 2021-07-15
Payer: MEDICAID

## 2021-07-15 VITALS
DIASTOLIC BLOOD PRESSURE: 84 MMHG | SYSTOLIC BLOOD PRESSURE: 130 MMHG | RESPIRATION RATE: 16 BRPM | OXYGEN SATURATION: 90 % | WEIGHT: 303.6 LBS | BODY MASS INDEX: 41.12 KG/M2 | HEART RATE: 84 BPM | TEMPERATURE: 98.1 F | HEIGHT: 72 IN

## 2021-07-15 LAB
ABSOLUTE EOS #: 0.1 K/UL (ref 0–0.4)
ABSOLUTE IMMATURE GRANULOCYTE: ABNORMAL K/UL (ref 0–0.3)
ABSOLUTE LYMPH #: 2.3 K/UL (ref 1–4.8)
ABSOLUTE MONO #: 0.9 K/UL (ref 0.1–1.2)
ANION GAP SERPL CALCULATED.3IONS-SCNC: 10 MMOL/L (ref 9–17)
BASOPHILS # BLD: 0 % (ref 0–2)
BASOPHILS ABSOLUTE: 0 K/UL (ref 0–0.2)
BUN BLDV-MCNC: 33 MG/DL (ref 6–20)
BUN/CREAT BLD: ABNORMAL (ref 9–20)
CALCIUM SERPL-MCNC: 8.9 MG/DL (ref 8.6–10.4)
CHLORIDE BLD-SCNC: 90 MMOL/L (ref 98–107)
CO2: 31 MMOL/L (ref 20–31)
CREAT SERPL-MCNC: 0.99 MG/DL (ref 0.7–1.2)
DIFFERENTIAL TYPE: ABNORMAL
EOSINOPHILS RELATIVE PERCENT: 1 % (ref 1–4)
GFR AFRICAN AMERICAN: >60 ML/MIN
GFR NON-AFRICAN AMERICAN: >60 ML/MIN
GFR SERPL CREATININE-BSD FRML MDRD: ABNORMAL ML/MIN/{1.73_M2}
GFR SERPL CREATININE-BSD FRML MDRD: ABNORMAL ML/MIN/{1.73_M2}
GLUCOSE BLD-MCNC: 147 MG/DL (ref 70–99)
GLUCOSE BLD-MCNC: 209 MG/DL (ref 75–110)
HCT VFR BLD CALC: 55.5 % (ref 41–53)
HEMOGLOBIN: 17.8 G/DL (ref 13.5–17.5)
IMMATURE GRANULOCYTES: ABNORMAL %
LV EF: 38 %
LVEF MODALITY: NORMAL
LYMPHOCYTES # BLD: 18 % (ref 24–44)
MCH RBC QN AUTO: 29.9 PG (ref 26–34)
MCHC RBC AUTO-ENTMCNC: 32.1 G/DL (ref 31–37)
MCV RBC AUTO: 93.2 FL (ref 80–100)
MONOCYTES # BLD: 7 % (ref 2–11)
NRBC AUTOMATED: ABNORMAL PER 100 WBC
PDW BLD-RTO: 15.7 % (ref 12.5–15.4)
PLATELET # BLD: 228 K/UL (ref 140–450)
PLATELET ESTIMATE: ABNORMAL
PMV BLD AUTO: 8.4 FL (ref 6–12)
POTASSIUM SERPL-SCNC: 3.6 MMOL/L (ref 3.7–5.3)
RBC # BLD: 5.96 M/UL (ref 4.5–5.9)
RBC # BLD: ABNORMAL 10*6/UL
SEG NEUTROPHILS: 74 % (ref 36–66)
SEGMENTED NEUTROPHILS ABSOLUTE COUNT: 9.5 K/UL (ref 1.8–7.7)
SODIUM BLD-SCNC: 131 MMOL/L (ref 135–144)
WBC # BLD: 12.8 K/UL (ref 3.5–11)
WBC # BLD: ABNORMAL 10*3/UL

## 2021-07-15 PROCEDURE — 6370000000 HC RX 637 (ALT 250 FOR IP): Performed by: NURSE PRACTITIONER

## 2021-07-15 PROCEDURE — 94761 N-INVAS EAR/PLS OXIMETRY MLT: CPT

## 2021-07-15 PROCEDURE — 93971 EXTREMITY STUDY: CPT

## 2021-07-15 PROCEDURE — 6360000002 HC RX W HCPCS: Performed by: NURSE PRACTITIONER

## 2021-07-15 PROCEDURE — 94660 CPAP INITIATION&MGMT: CPT

## 2021-07-15 PROCEDURE — 94640 AIRWAY INHALATION TREATMENT: CPT

## 2021-07-15 PROCEDURE — 82947 ASSAY GLUCOSE BLOOD QUANT: CPT

## 2021-07-15 PROCEDURE — 6370000000 HC RX 637 (ALT 250 FOR IP): Performed by: INTERNAL MEDICINE

## 2021-07-15 PROCEDURE — 36415 COLL VENOUS BLD VENIPUNCTURE: CPT

## 2021-07-15 PROCEDURE — 85025 COMPLETE CBC W/AUTO DIFF WBC: CPT

## 2021-07-15 PROCEDURE — 99239 HOSP IP/OBS DSCHRG MGMT >30: CPT | Performed by: INTERNAL MEDICINE

## 2021-07-15 PROCEDURE — 2580000003 HC RX 258: Performed by: NURSE PRACTITIONER

## 2021-07-15 PROCEDURE — A9500 TC99M SESTAMIBI: HCPCS | Performed by: NURSE PRACTITIONER

## 2021-07-15 PROCEDURE — 2700000000 HC OXYGEN THERAPY PER DAY

## 2021-07-15 PROCEDURE — 80048 BASIC METABOLIC PNL TOTAL CA: CPT

## 2021-07-15 PROCEDURE — 93017 CV STRESS TEST TRACING ONLY: CPT

## 2021-07-15 PROCEDURE — 3430000000 HC RX DIAGNOSTIC RADIOPHARMACEUTICAL: Performed by: NURSE PRACTITIONER

## 2021-07-15 RX ORDER — LOSARTAN POTASSIUM 100 MG/1
100 TABLET ORAL DAILY
Qty: 30 TABLET | Refills: 3 | Status: SHIPPED | OUTPATIENT
Start: 2021-07-16

## 2021-07-15 RX ORDER — METOPROLOL TARTRATE 5 MG/5ML
5 INJECTION INTRAVENOUS EVERY 5 MIN PRN
Status: DISCONTINUED | OUTPATIENT
Start: 2021-07-15 | End: 2021-07-15 | Stop reason: ALTCHOICE

## 2021-07-15 RX ORDER — AMLODIPINE BESYLATE 5 MG/1
5 TABLET ORAL ONCE
Status: COMPLETED | OUTPATIENT
Start: 2021-07-15 | End: 2021-07-15

## 2021-07-15 RX ORDER — AMLODIPINE BESYLATE 10 MG/1
10 TABLET ORAL DAILY
Qty: 30 TABLET | Refills: 3 | Status: SHIPPED | OUTPATIENT
Start: 2021-07-16

## 2021-07-15 RX ORDER — MONTELUKAST SODIUM 10 MG/1
10 TABLET ORAL NIGHTLY
Qty: 30 TABLET | Refills: 3 | Status: SHIPPED | OUTPATIENT
Start: 2021-07-15

## 2021-07-15 RX ORDER — AMLODIPINE BESYLATE 5 MG/1
10 TABLET ORAL DAILY
Status: DISCONTINUED | OUTPATIENT
Start: 2021-07-16 | End: 2021-07-15 | Stop reason: HOSPADM

## 2021-07-15 RX ORDER — FUROSEMIDE 20 MG/1
40 TABLET ORAL DAILY
Status: DISCONTINUED | OUTPATIENT
Start: 2021-07-15 | End: 2021-07-15 | Stop reason: HOSPADM

## 2021-07-15 RX ORDER — FLUTICASONE FUROATE AND VILANTEROL 100; 25 UG/1; UG/1
1 POWDER RESPIRATORY (INHALATION) DAILY
Qty: 30 EACH | Refills: 0 | Status: SHIPPED | OUTPATIENT
Start: 2021-07-15 | End: 2021-07-21 | Stop reason: SDUPTHER

## 2021-07-15 RX ORDER — ALBUTEROL SULFATE 90 UG/1
2 AEROSOL, METERED RESPIRATORY (INHALATION) PRN
Status: DISCONTINUED | OUTPATIENT
Start: 2021-07-15 | End: 2021-07-15 | Stop reason: ALTCHOICE

## 2021-07-15 RX ORDER — AMINOPHYLLINE DIHYDRATE 25 MG/ML
50 INJECTION, SOLUTION INTRAVENOUS PRN
Status: ACTIVE | OUTPATIENT
Start: 2021-07-15 | End: 2021-07-15

## 2021-07-15 RX ORDER — CLONIDINE HYDROCHLORIDE 0.1 MG/1
0.1 TABLET ORAL 2 TIMES DAILY
Qty: 60 TABLET | Refills: 3 | Status: SHIPPED | OUTPATIENT
Start: 2021-07-15 | End: 2021-07-20 | Stop reason: SDUPTHER

## 2021-07-15 RX ORDER — SODIUM CHLORIDE 0.9 % (FLUSH) 0.9 %
5-40 SYRINGE (ML) INJECTION PRN
Status: DISCONTINUED | OUTPATIENT
Start: 2021-07-15 | End: 2021-07-15 | Stop reason: ALTCHOICE

## 2021-07-15 RX ORDER — AZITHROMYCIN 500 MG/1
500 TABLET, FILM COATED ORAL DAILY
Qty: 3 TABLET | Refills: 0 | Status: SHIPPED | OUTPATIENT
Start: 2021-07-16 | End: 2021-07-19

## 2021-07-15 RX ORDER — NITROGLYCERIN 0.4 MG/1
0.4 TABLET SUBLINGUAL EVERY 5 MIN PRN
Status: DISCONTINUED | OUTPATIENT
Start: 2021-07-15 | End: 2021-07-15 | Stop reason: ALTCHOICE

## 2021-07-15 RX ORDER — PREDNISONE 20 MG/1
40 TABLET ORAL DAILY
Qty: 8 TABLET | Refills: 0 | Status: SHIPPED | OUTPATIENT
Start: 2021-07-16 | End: 2021-07-20

## 2021-07-15 RX ORDER — ATROPINE SULFATE 0.1 MG/ML
0.5 INJECTION INTRAVENOUS EVERY 5 MIN PRN
Status: DISCONTINUED | OUTPATIENT
Start: 2021-07-15 | End: 2021-07-15 | Stop reason: ALTCHOICE

## 2021-07-15 RX ORDER — FUROSEMIDE 40 MG/1
40 TABLET ORAL DAILY
Qty: 60 TABLET | Refills: 3 | Status: SHIPPED | OUTPATIENT
Start: 2021-07-16 | End: 2021-07-26 | Stop reason: SDUPTHER

## 2021-07-15 RX ORDER — SODIUM CHLORIDE 9 MG/ML
500 INJECTION, SOLUTION INTRAVENOUS CONTINUOUS PRN
Status: DISCONTINUED | OUTPATIENT
Start: 2021-07-15 | End: 2021-07-15 | Stop reason: ALTCHOICE

## 2021-07-15 RX ADMIN — CLONIDINE HYDROCHLORIDE 0.1 MG: 0.1 TABLET ORAL at 08:40

## 2021-07-15 RX ADMIN — DULOXETINE 60 MG: 30 CAPSULE, DELAYED RELEASE ORAL at 08:40

## 2021-07-15 RX ADMIN — TETRAKIS(2-METHOXYISOBUTYLISOCYANIDE)COPPER(I) TETRAFLUOROBORATE 34.6 MILLICURIE: 1 INJECTION, POWDER, LYOPHILIZED, FOR SOLUTION INTRAVENOUS at 09:05

## 2021-07-15 RX ADMIN — HYDRALAZINE HYDROCHLORIDE 10 MG: 20 INJECTION INTRAMUSCULAR; INTRAVENOUS at 00:13

## 2021-07-15 RX ADMIN — AMLODIPINE BESYLATE 5 MG: 5 TABLET ORAL at 08:41

## 2021-07-15 RX ADMIN — INSULIN LISPRO 4 UNITS: 100 INJECTION, SOLUTION INTRAVENOUS; SUBCUTANEOUS at 11:33

## 2021-07-15 RX ADMIN — ENOXAPARIN SODIUM 40 MG: 100 INJECTION SUBCUTANEOUS at 08:38

## 2021-07-15 RX ADMIN — SODIUM CHLORIDE, PRESERVATIVE FREE 10 ML: 5 INJECTION INTRAVENOUS at 08:43

## 2021-07-15 RX ADMIN — AMLODIPINE BESYLATE 5 MG: 5 TABLET ORAL at 09:30

## 2021-07-15 RX ADMIN — PREDNISONE 40 MG: 20 TABLET ORAL at 08:38

## 2021-07-15 RX ADMIN — IPRATROPIUM BROMIDE AND ALBUTEROL SULFATE 1 AMPULE: .5; 3 SOLUTION RESPIRATORY (INHALATION) at 08:15

## 2021-07-15 RX ADMIN — REGADENOSON 0.4 MG: 0.08 INJECTION, SOLUTION INTRAVENOUS at 09:05

## 2021-07-15 RX ADMIN — IPRATROPIUM BROMIDE AND ALBUTEROL SULFATE 1 AMPULE: .5; 3 SOLUTION RESPIRATORY (INHALATION) at 12:59

## 2021-07-15 RX ADMIN — HYDROCHLOROTHIAZIDE 25 MG: 25 TABLET ORAL at 08:39

## 2021-07-15 RX ADMIN — LEVOTHYROXINE SODIUM 25 MCG: 0.03 TABLET ORAL at 08:41

## 2021-07-15 RX ADMIN — BUDESONIDE AND FORMOTEROL FUMARATE DIHYDRATE 2 PUFF: 160; 4.5 AEROSOL RESPIRATORY (INHALATION) at 08:15

## 2021-07-15 RX ADMIN — SODIUM CHLORIDE, PRESERVATIVE FREE 10 ML: 5 INJECTION INTRAVENOUS at 09:05

## 2021-07-15 RX ADMIN — AZITHROMYCIN MONOHYDRATE 500 MG: 250 TABLET ORAL at 08:40

## 2021-07-15 RX ADMIN — FUROSEMIDE 40 MG: 20 TABLET ORAL at 09:30

## 2021-07-15 RX ADMIN — FUROSEMIDE 40 MG: 10 INJECTION, SOLUTION INTRAMUSCULAR; INTRAVENOUS at 08:41

## 2021-07-15 RX ADMIN — ATORVASTATIN CALCIUM 40 MG: 40 TABLET, FILM COATED ORAL at 08:41

## 2021-07-15 RX ADMIN — LOSARTAN POTASSIUM 100 MG: 50 TABLET, FILM COATED ORAL at 08:39

## 2021-07-15 RX ADMIN — CARVEDILOL 25 MG: 12.5 TABLET, FILM COATED ORAL at 08:39

## 2021-07-15 RX ADMIN — BUPROPION HYDROCHLORIDE 300 MG: 150 TABLET, FILM COATED, EXTENDED RELEASE ORAL at 08:39

## 2021-07-15 ASSESSMENT — ENCOUNTER SYMPTOMS
VOMITING: 0
ABDOMINAL PAIN: 0
COUGH: 0
SORE THROAT: 0
SHORTNESS OF BREATH: 1
CONSTIPATION: 0
SHORTNESS OF BREATH: 0
BLOOD IN STOOL: 0
TROUBLE SWALLOWING: 0
CHEST TIGHTNESS: 1
COLOR CHANGE: 1
WHEEZING: 1
WHEEZING: 0
COUGH: 1
DIARRHEA: 0
CHEST TIGHTNESS: 0
NAUSEA: 0

## 2021-07-15 ASSESSMENT — PAIN SCALES - GENERAL: PAINLEVEL_OUTOF10: 0

## 2021-07-15 NOTE — PROGRESS NOTES
Patient presents for lexiscan stress test. Educated on procedure. All questions/concerns addressed. Labs, allergies and medication reviewed. Patient prepped for procedure. Stress test will be supervised by CARLIE Yang.

## 2021-07-15 NOTE — PROGRESS NOTES
CLINICAL PHARMACY NOTE: MEDS TO BEDS    Total # of Prescriptions Filled: 8   The following medications were delivered to the patient:  · Symbicort 160/4.5  · Prednisone 20mg  · Furosemide 40mg   · Clonidine 0.1mg  · Losartan 100mg  · Amodipine 10mg  · Zithromax 250  · Singulair 10mg    Additional Documentation:

## 2021-07-15 NOTE — PROGRESS NOTES
[facility]  Office: [address], DO, Sandro Rubio, DO, Rajat Colin, DO, Devi Jude Blood, DO, Austyn Herrera MD, Eric Sharma MD, Grace Guerrier MD, Selena Elena MD, Dennys Raza MD, Lissa Porter MD, Eliz Gaytan MD, Rodolfo Pressley, DO, Carson Costa MD, dAali Corona, DO, Mireille Delaney MD,  Natali Dozier, DO, Madhuri Swanson MD, Tee Boles MD, Leia Little MD, Rafaela Faust MD, Iggy Hassan MD, Kristie Hernandez MD, Zuleyka Babin, [facility], [facility], CNP, Alfreda Suazo, CNP, Allan Easley, CNS, Antonio West, CNP, Gerilyn Cockayne, CNP, Richard Murillo, CNP, Luana De La Torre, CNP, Camillo Schaumann, CNP, Matthew Lopez PA-C, John Herrera, [facility], Marsha Toro, CNP, Stanley Acevedo, [facility], Patricia Roland, CNP, Venkat Mahan, CNP, Chong Mason, CNP, Arminda Petersen, CNP, Gladies Kocher, CNP, Arely [address]    Progress Note    7/15/2021    1:12 PM    Name:   Ronel Shetty  MRN:     [id number]     Acct:      [de-identified]   Room:   [address] [place] [address] Day:  4  Admit Date:  7/11/2021  5:29 PM    PCP:   William Cabrera DO  Code Status:  Full Code    Subjective:     C/C:   Chief Complaint   Patient presents with    Shortness of Breath     ongoing    Chest Pain     Interval History Status: improved. Feeling fine today, stress test was negative. Patient eager for discharge    Brief History:     Per previous documentation    Ronel Shetty is a 44 y.o. Non-/non  male who presents with Shortness of Breath (ongoing) and Chest Pain   and is admitted to the hospital for the management of Hypertensive emergency.     Patient reports that he has a history of high blood pressure and has not been able to comply with his antihypertensive regimen due to he has no health insurance at this time.   He says that for the past month he has had chest pressure, abdomen has \"felt full\", and has had swelling in his Oral Daily    carvedilol  25 mg Oral BID     predniSONE  40 mg Oral Daily    cloNIDine  0.1 mg Oral BID    insulin lispro  0-12 Units Subcutaneous TID     insulin lispro  0-6 Units Subcutaneous Nightly    ipratropium-albuterol  1 ampule Inhalation Q4H WA    azithromycin  500 mg Oral Daily    budesonide-formoterol  2 puff Inhalation BID    atorvastatin  40 mg Oral Daily    buPROPion  300 mg Oral QAM    [Held by provider] cariprazine hcl  1.5 mg Oral Daily    DULoxetine  60 mg Oral Daily    [Held by provider] empagliflozin  10 mg Oral Daily    losartan  100 mg Oral Daily    levothyroxine  25 mcg Oral Daily    montelukast  10 mg Oral Nightly    Semaglutide  3 mg Oral Daily    sodium chloride flush  5-40 mL Intravenous 2 times per day    enoxaparin  40 mg Subcutaneous Daily    sodium chloride flush  5-40 mL Intravenous 2 times per day    nicotine  1 patch Transdermal Daily     Continuous Infusions:    sodium chloride      sodium chloride 25 mL (07/11/21 2230)    dextrose       PRN Meds: aminophylline, hydrALAZINE, albuterol sulfate HFA, sodium chloride flush, sodium chloride, ondansetron **OR** ondansetron, acetaminophen **OR** acetaminophen, sodium chloride flush, sodium chloride, glucose, dextrose, glucagon (rDNA), dextrose    Data:     Past Medical History:   has a past medical history of Arthritis, COPD (chronic obstructive pulmonary disease) ([address] Hyperlipidemia, and Hypertension. Social History:   reports that he has been smoking cigarettes. He has a 30.00 pack-year smoking history. He has never used smokeless tobacco. He reports current alcohol use of about 13.0 - 14.0 standard drinks of alcohol per week. He reports that he does not use drugs.      Family History:   Family History   Problem Relation Age of Onset    Other Mother     Heart Attack Father     Other Brother        Vitals:  /84   Pulse 84 Comment: end stress test recover per CASIMIRO Cavazos-CAROLINA  Temp 98.1 °F (36.7 °C) (Oral)   Resp 16   Ht 6' (1.829 m)   Wt (!) 303 lb 9.6 oz (137.7 kg)   SpO2 90%   BMI 41.18 kg/m²   Temp (24hrs), Av.8 °F (36.6 °C), Min:97.4 °F (36.3 °C), Max:98.1 °F (36.7 °C)    Recent Labs     21  11521  1608 07/14/21  2020 07/15/21  1127   POCGLU 158* 209* 226* 209*       I/O (24Hr): Intake/Output Summary (Last 24 hours) at 7/15/2021 1312  Last data filed at 7/15/2021 0709  Gross per 24 hour   Intake --   Output 1100 ml   Net -1100 ml       Labs:  Hematology:  Recent Labs     21  1241 21  0558 07/15/21  0550   WBC 16.4* 18.0* 12.8*   RBC 5.67 5.72 5.96*   HGB 17.1 17.1 17.8*   HCT 52.9 53.3* 55.5*   MCV 93.3 93.2 93.2   MCH 30.2 30.0 29.9   MCHC 32.4 32.1 32.1   RDW 16.1* 16.3* 15.7*    230 228   MPV 8.1 8.4 8.4     Chemistry:  Recent Labs     21  1241 21  0558 07/15/21  0550   * 131* 131*   K 4.4 4.3 3.6*   CL 91* 93* 90*   CO2 25 25 31   GLUCOSE 243* 178* 147*   BUN 34* 30* 33*   CREATININE 1.16 0.92 0.99   MG 2.2  --   --    ANIONGAP 15 13 10   LABGLOM >60 >60 >60   GFRAA >60 >60 >60   CALCIUM 9.4 9.0 8.9     Recent Labs     21  1656 21  1608 07/14/21  2020 07/15/21  1127   POCGLU 205* 264* 158* 209* 226* 209*     ABG:No results found for: POCPH, PHART, PH, POCPCO2, EAU1DUV, PCO2, POCPO2, PO2ART, PO2, POCHCO3, MDL4IEL, HCO3, NBEA, PBEA, BEART, BE, THGBART, THB, TOI7CIH, KHJJ6DLV, L7XEKPUO, O2SAT, FIO2  No results found for: SPECIAL  No results found for: CULTURE    Radiology:  XR CHEST (SINGLE VIEW FRONTAL)    Result Date: 2021  Cardiomegaly with worsening vascular congestion and perihilar edema. XR CHEST (2 VW)    Result Date: 2021  Cardiomegaly, mild central venous congestion       Physical Examination:     Physical Exam  Vitals and nursing note reviewed. Constitutional:       Appearance: He is obese.    HENT:      Mouth/Throat:      Mouth: Mucous membranes are moist. Eyes:      Extraocular Movements: Extraocular movements intact. Cardiovascular:      Rate and Rhythm: Normal rate and regular rhythm. Pulses: Normal pulses. Heart sounds: Normal heart sounds. Pulmonary:      Effort: Pulmonary effort is normal.      Breath sounds: Examination of the right-upper field reveals wheezing. Examination of the left-upper field reveals wheezing. Examination of the right-middle field reveals wheezing. Examination of the left-middle field reveals wheezing. Examination of the right-lower field reveals decreased breath sounds. Decreased breath sounds and wheezing present. Abdominal:      General: Bowel sounds are normal.      Palpations: Abdomen is soft. Musculoskeletal:      Right lower le+ Edema present. Left lower le+ Edema present. Skin:     General: Skin is warm and dry. Capillary Refill: Capillary refill takes less than 2 seconds. Comments: Chronic redness to his feet bilaterally   Neurological:      Mental Status: He is alert and oriented to person, place, and time. Psychiatric:         Mood and Affect: Mood normal.         Thought Content: Thought content normal.         Assessment:     Hospital Problems         Last Modified POA    * (Principal) Hypertensive emergency 2021 Yes    Tobacco abuse 2021 Yes    Hyperlipidemia 2021 Yes    COPD (chronic obstructive pulmonary disease) ([facility] [address] 2021 Yes    Atypical chest pain 2021 Yes    Noncompliance 2021 Yes    Grade I diastolic dysfunction  Yes    Type 2 diabetes mellitus, without long-term current use of insulin ([facility] [address] 2021 Yes    Hypertensive urgency 2021 Yes          Plan:     1. Hypertensive urgency: Cardiology consulted, blood pressure seems improved today. 2. Tobacco abuse: Patient counseled to stop smoking as he smokes up to 2 packs/day at this time. Continue to reinforce smoking cessation throughout hospital stay.   3. Hyperlipidemia: Continue statin  4. Atypical chest pain:   5. Noncompliance: Case management to assist with discharge plan and help arrange assistance for obtaining needed prescriptions  6. Grade 1 diastolic dysfunction: Lasix 40 mg daily. Continue hydrochlorothiazide. BMP daily. Replace electrolytes as needed. Cardiology consulted  7. Diabetes: Monitor blood glucose before meals and at bedtime. Sliding scale insulin as ordered. Consider starting oral antidiabetic agent and encourage weight loss. Carb controlled diet. Further diabetic management as outpatient per PCP. 8. Copd; continue solumedrol   9. Plan:  1. Nuclear stress test is negative. Discharge today and cleared by cardiology  2. Discharge today with new blood pressure medications  3. Discussed improved blood pressure control, discussed finding pulse oximeter and home blood pressure monitor. Discussed that systolic blood pressure over 180 or 90 should seek medical attention. Discussed signs and symptoms of hypertensive emergency. 4. Discussed importance of diabetes control  5. Discussed close follow-up with cardiology  6. Provided list of PCPs for close follow-up  7. Recommended multiple times that patient may need an outpatient secondary hypertension work-up  8.  Discharge today    Xin Carey DO  7/15/2021  1:12 PM

## 2021-07-15 NOTE — DISCHARGE INSTR - COC
Continuity of Care Form    Patient Name: Deirdre Grajeda   :  1970  MRN:  [id number]    Admit date:  2021  Discharge date:  ***    Code Status Order: Full Code   Advance Directives:      Admitting Physician:  Rodger Miranda DO  PCP: Shena Kee DO    Discharging Nurse: [facility] Unit/Room#: 324/324-01  Discharging Unit Phone Number: ***    Emergency Contact:   Extended Emergency Contact Information  Primary Emergency Contact: Shruthi Rincon  Home Phone: [phone]  Relation: Aunt/Uncle    Past Surgical History:  Past Surgical History:   Procedure Laterality Date    ANKLE SURGERY         Immunization History: There is no immunization history on file for this patient.     Active Problems:  Patient Active Problem List   Diagnosis Code    Tobacco abuse Z72.0    Hyperlipidemia E78.5    COPD (chronic obstructive pulmonary disease) ([facility] [address] J44.9    Atypical chest pain R07.89    Accelerated hypertension I10    Noncompliance Z91.19    Hypertensive emergency I16.1    Grade I diastolic dysfunction Z70.4    Type 2 diabetes mellitus, without long-term current use of insulin ([facility]) E11.9    Hypertensive urgency I16.0       Isolation/Infection:   Isolation            No Isolation          Patient Infection Status       None to display            Nurse Assessment:  Last Vital Signs: /84   Pulse 84 Comment: end stress test recover per CASIMIRO Wallace-CNP  Temp 98.1 °F (36.7 °C) (Oral)   Resp 16   Ht 6' (1.829 m)   Wt (!) 303 lb 9.6 oz (137.7 kg)   SpO2 90%   BMI 41.18 kg/m²     Last documented pain score (0-10 scale): Pain Level: 0  Last Weight:   Wt Readings from Last 1 Encounters:   21 (!) 303 lb 9.6 oz (137.7 kg)     Mental Status:  {IP PT MENTAL STATUS::::0}    IV Access:  {MH ZHANNA IV ACCESS:156651983:::0}    Nursing Mobility/ADLs:  Walking   {CHP DME ADLs:056217313:::0}  Transfer  {CHP DME ADLs:789589012:::0}  Bathing  {CHP DME ADLs:215441462:::0}  Dressing  {CHP DME ADLs:821790086:::0}  Toileting  {CHP DME ADLs:179082024:::0}  Feeding  {CHP DME ADLs:635067811:::0}  Med Admin  {CHP DME ADLs:417546172:::0}  Med Delivery   { ZHANNA MED Delivery:269022082:::0}    Wound Care Documentation and Therapy:        Elimination:  Continence:    Bowel: {YES / OC:81484}  Bladder: {YES / JOSE:20438}  Urinary Catheter: {Urinary Catheter:483135082:::0}   Colostomy/Ileostomy/Ileal Conduit: {YES / SC:40533}       Date of Last BM: ***    Intake/Output Summary (Last 24 hours) at 7/15/2021 1323  Last data filed at 7/15/2021 0709  Gross per 24 hour   Intake --   Output 1100 ml   Net -1100 ml     I/O last 3 completed shifts:  In: -   Out: 2100 [Urine:2100]    Safety Concerns:     [address] [employer] Safety Concerns:482317711:::0}    Impairments/Disabilities:      [address] [employer] Impairments/Disabilities:863484188:::0}    Nutrition Therapy:  Current Nutrition Therapy:   [address] [employer] Diet List:069235301:::0}    Routes of Feeding: {CHP DME Other Feedings:723026621:::0}  Liquids: {Slp liquid thickness:38412}  Daily Fluid Restriction: {CHP DME Yes amt example:773741289:::0}  Last Modified Barium Swallow with Video (Video Swallowing Test): {Done Not Done LZPO:336513548:::5}    Treatments at the Time of Hospital Discharge:   Respiratory Treatments: ***  Oxygen Therapy:  {Therapy; copd oxygen:47313:::0}  Ventilator:    {[facility] Vent List:090721359:::0}    Rehab Therapies: {THERAPEUTIC INTERVENTION:2343622863}  Weight Bearing Status/Restrictions: [address] [place] Weight Bearin:::0}  Other Medical Equipment (for information only, NOT a DME order):  {EQUIPMENT:915671507}  Other Treatments: ***    Patient's personal belongings (please select all that are sent with patient):  {CHP DME Belongings:178255953:::0}    RN SIGNATURE:  {Esignature:603767962:::0}    CASE MANAGEMENT/SOCIAL WORK SECTION    Inpatient Status Date: ***    Readmission Risk Assessment Score:  Readmission Risk              Risk of Unplanned Readmission:  19 Discharging to Facility/ Agency   Name:   Address:  Phone:  Fax:    Dialysis Facility (if applicable)   Name:  Address:  Dialysis Schedule:  Phone:  Fax:    / signature: {Esignature:054297738:::0}    PHYSICIAN SECTION    Prognosis: {Prognosis:7388493710:::0}    Condition at Discharge: Rickie Knowles Patient Condition:044227278:::0}    Rehab Potential (if transferring to Rehab): {Prognosis:1329574319:::0}    Recommended Labs or Other Treatments After Discharge: ***    Physician Certification: I certify the above information and transfer of Min Garcia  is necessary for the continuing treatment of the diagnosis listed and that he requires {Admit to Appropriate Level of Care:75145:::0} for {GREATER/LESS:021310589} 30 days.      Update Admission H&P: {CHP DME Changes in HandP:339802551:::0}    PHYSICIAN SIGNATURE:  {Esignature:848697365:::0}

## 2021-07-15 NOTE — PROGRESS NOTES
Patient discharged to home with all belongings and discharge instructions. Medications received from outpatient pharmacy. No other questions or concerns voiced at this time.

## 2021-07-15 NOTE — DISCHARGE SUMMARY
[facility]  Office: [address], DO, Beth Maherbs, DO, Joseluis Thurston, DO, Maria Fernanda Tse Blood, DO, Wilberto Hernandez MD, Tigre Dent MD, Morelia Holder MD, Theo Olea MD, Ilia Yip MD, Kristie Hester MD, Mic Sherman MD, Lebron Lama, DO, Nica Bradshaw MD, Norah Huston, DO, Jennifer Paul MD,  Tomas Solano, DO, Joseline Galvan MD, Faustino Hernandez MD, Raenette Felty, MD, Delfina Romeo MD, Ed Watkins MD, Roselia Naqvi MD, Karlene Singleton, CNP, [place] Raymundo, CNP, Allyssa Cornejo, CNP, Gina Salazar, CNS, Dayday Sung, CNP, Layne Doshi, CNP, Shavon Goddard, CNP, Cheikh Brady, CNP, Anais Colón, CNP, Rock Cordova PA-C, Thaddeus Melendez, [facility], Matthew Pena, CNP, Verónica Hernández, CNP, Cami Haywood, CNP, Heather Pool, CNP, Otto Paulson, CNP, June Cruz, CNP, Safia Rodriguez, CNP, Sai Limb, [address]   [address] [facility]    Discharge Summary     Patient ID: Carmelo Valdez  :  1970   MRN: [id number]     ACCOUNT:  [de-identified]   Patient's PCP: Esteban Dubois DO  Admit Date: 2021   Discharge Date: 2021     Length of Stay: 4  Code Status:  Prior  Admitting Physician: Norah Huston DO  Discharge Physician: Norah Huston DO     Active Discharge Diagnoses:     Hospital Problem Lists:  Principal Problem:    Hypertensive emergency  Active Problems:    Tobacco abuse    Hyperlipidemia    COPD (chronic obstructive pulmonary disease) ([facility] [address]    Atypical chest pain    Noncompliance    Grade I diastolic dysfunction    Type 2 diabetes mellitus, without long-term current use of insulin ([facility] [address]    Hypertensive urgency  Resolved Problems:    * No resolved hospital problems.  *      Admission Condition:  good     Discharged Condition: good    Hospital Stay:     Hospital Course:  Carmelo Valdez is a 44 y.o. male who was admitted for the management of  Hypertensive emergency , presented to ER with Shortness of Breath (ongoing) and Chest Pain  Was admitted to the general medicine floor with consult to pulmonary medicine and cardiology. Patient was initially on a Cardene drip and maintained in the ICU as medications were increased. Patient was requiring BiPAP initially, seen by pulmonary medicine. Started on steroids antibiotics. Patient's blood pressure improved, he underwent a nuclear stress test that was negative. Patient was discharged home to follow-up outpatient with PCP. Patient was recommended to get a secondary hypertension work-up, continue close follow-up with cardiology, obtain a home blood pressure cuff and pulse oximeter. Patient was provided information for follow-up for CPAP titration as well.   Patient was discharged home, no other issues during hospitalization    Significant therapeutic interventions: none    Significant Diagnostic Studies:   Labs / Micro:  CBC:   Lab Results   Component Value Date    WBC 12.8 07/15/2021    RBC 5.96 07/15/2021    HGB 17.8 07/15/2021    HCT 55.5 07/15/2021    MCV 93.2 07/15/2021    MCH 29.9 07/15/2021    MCHC 32.1 07/15/2021    RDW 15.7 07/15/2021     07/15/2021     BMP:    Lab Results   Component Value Date    GLUCOSE 147 07/15/2021     07/15/2021    K 3.6 07/15/2021    CL 90 07/15/2021    CO2 31 07/15/2021    ANIONGAP 10 07/15/2021    BUN 33 07/15/2021    CREATININE 0.99 07/15/2021    BUNCRER NOT REPORTED 07/15/2021    CALCIUM 8.9 07/15/2021    LABGLOM >60 07/15/2021    GFRAA >60 07/15/2021    GFR      07/15/2021    GFR NOT REPORTED 07/15/2021     HFP:    Lab Results   Component Value Date    PROT 6.5 07/12/2021     CMP:    Lab Results   Component Value Date    GLUCOSE 147 07/15/2021     07/15/2021    K 3.6 07/15/2021    CL 90 07/15/2021    CO2 31 07/15/2021    BUN 33 07/15/2021    CREATININE 0.99 07/15/2021    ANIONGAP 10 07/15/2021    ALKPHOS 103 07/12/2021    ALT 43 07/12/2021    AST 23 07/12/2021    BILITOT 0.39 07/12/2021    LABALBU 3.7 07/12/2021    ALBUMIN 1.3 days     cloNIDine 0.1 MG tablet  Commonly known as: CATAPRES  Take 1 tablet by mouth 2 times daily     fluticasone-vilanterol 100-25 MCG/INH Aepb inhaler  Commonly known as: BREO ELLIPTA  Inhale 1 puff into the lungs daily     furosemide 40 MG tablet  Commonly known as: LASIX  Take 1 tablet by mouth daily     losartan 100 MG tablet  Commonly known as: COZAAR  Take 1 tablet by mouth daily  Replaces: irbesartan 300 MG tablet     predniSONE 20 MG tablet  Commonly known as: DELTASONE  Take 2 tablets by mouth daily for 4 days            CHANGE how you take these medications      Jardiance 10 MG tablet  Generic drug: empagliflozin  What changed: Another medication with the same name was removed. Continue taking this medication, and follow the directions you see here.             CONTINUE taking these medications      ALBUTEROL IN     atorvastatin 40 MG tablet  Commonly known as: LIPITOR     buPROPion 300 MG extended release tablet  Commonly known as: WELLBUTRIN XL     carvedilol 25 MG tablet  Commonly known as: COREG     DULoxetine 60 MG extended release capsule  Commonly known as: CYMBALTA     levothyroxine 25 MCG tablet  Commonly known as: SYNTHROID     montelukast 10 MG tablet  Commonly known as: SINGULAIR  Take 1 tablet by mouth nightly     Semaglutide 3 MG Tabs     terbinafine 250 MG tablet  Commonly known as: LAMISIL            STOP taking these medications      gabapentin 400 MG capsule  Commonly known as: NEURONTIN     hydroCHLOROthiazide 25 MG tablet  Commonly known as: HYDRODIURIL     irbesartan 300 MG tablet  Commonly known as: AVAPRO  Replaced by: losartan 100 MG tablet     lisinopril 20 MG tablet  Commonly known as: PRINIVIL;ZESTRIL     metoprolol tartrate 50 MG tablet  Commonly known as: LOPRESSOR     Vraylar 1.5 MG capsule  Generic drug: cariprazine hcl               Where to Get Your Medications        These medications were sent to [facility], [address] [place] [address] [address] [place] [phone] Lissa Mensah [address] [facility], [address] [place] [address]      Phone: [phone]   amLODIPine 10 MG tablet  azithromycin 500 MG tablet  cloNIDine 0.1 MG tablet  fluticasone-vilanterol 100-25 MCG/INH Aepb inhaler  furosemide 40 MG tablet  losartan 100 MG tablet  montelukast 10 MG tablet  predniSONE 20 MG tablet         No discharge procedures on file. Time Spent on discharge is  37 mins in patient examination, evaluation, counseling as well as medication reconciliation, prescriptions for required medications, discharge plan and follow up. Electronically signed by   Ashia Pablo DO  7/16/2021  10:23 AM      Thank you Dr. Maye Garcia DO for the opportunity to be involved in this patient's care.

## 2021-07-15 NOTE — PROCEDURES
[address]                [id number] [address] [place], [facility] [address]                              CARDIAC STRESS TEST    PATIENT NAME: Suzan Lugo                    :        1970  MED REC NO:   [id number]                             ROOM:       OTFP  ACCOUNT NO:   [de-identified]                           ADMIT DATE: 2021  PROVIDER:     Vanita Davis    CARDIOVASCULAR DIAGNOSTIC DEPARTMENT    DATE OF STUDY:  07/15/2021    ORDERING PROVIDER:  CASIMIRO Amato-CNP    PRIMARY CARE PROVIDER:  Micheal Atkins DO    INTERPRETING PHYSICIAN:  Anny Carroll MD    TEST TYPE: LEXISCAN CARDIOLYTE STRESS TEST  INDICATION: CHEST PAIN AND SHORTNESS OF BREATH    RESTING HEART RATE: 71 bpm  RESTING BLOOD PRESSURE: 142/68 mmHg  PEAK HEART RATE: 92 bpm  PEAK BLOOD PRESSURE: 142/68 mmHg    MEDICATION(S) GIVEN: 0.4 mg IV LEXISCAN. PO CAFFEINE. REASON FOR TERMINATION: MEDICATION INFUSION COMPLETE  SYMPTOMS: SHORTNESS OF BREATH POST INJECTION. RESOLVED IN RECOVERY. RESTING EKG: ABNORMAL. NORMAL SINUS RHYTHM WITH ATRIAL ENLARGEMENT. STRESS HEART RESPONSE: NORMAL RESPONSE.  BLOOD PRESSURE RESPONSE: APPROPRIATE. STRESS EKGs: NO CHANGES SEEN. CHEST DISCOMFORT: NO PAIN DURING STRESS. NO PAIN DURING RECOVERY. ISCHEMIC EKG CHANGES: NONE.    EKG IMPRESSION: NEGATIVE ELECTROCARDIOGRAPHICALLY LEXISCAN STRESS TEST. NUCLEAR SCAN TO FOLLOW.     Anny Carroll    D: 07/15/2021 12:01:38       T: 07/15/2021 12:02:51     MT/YIMI  Job#: [id number]     Doc#: Unknown    CC:    (Retain this field even if not dictated or not decipherable)

## 2021-07-15 NOTE — PROGRESS NOTES
[facility]   Progress Note                   Date:   7/15/2021  Patient name: Gordon Ortez  Date of admission:  7/11/2021  5:29 PM  MRN:   [id number]  YOB: 1970  PCP: John Coughlin DO    Reason for Admission: Hypertensive emergency [I16.1]    Subjective:       Clinical Changes / Abnormalities: Pt seen and examined in the stress lab. Pt denies any CP or SOB today. BP better controlled. Pt breathing better.         Medications:   Scheduled Meds:   amLODIPine  5 mg Oral Daily    carvedilol  25 mg Oral BID WC    furosemide  40 mg Intravenous Daily    predniSONE  40 mg Oral Daily    cloNIDine  0.1 mg Oral BID    insulin lispro  0-12 Units Subcutaneous TID WC    insulin lispro  0-6 Units Subcutaneous Nightly    ipratropium-albuterol  1 ampule Inhalation Q4H WA    azithromycin  500 mg Oral Daily    budesonide-formoterol  2 puff Inhalation BID    atorvastatin  40 mg Oral Daily    buPROPion  300 mg Oral QAM    [Held by provider] cariprazine hcl  1.5 mg Oral Daily    DULoxetine  60 mg Oral Daily    [Held by provider] empagliflozin  10 mg Oral Daily    hydroCHLOROthiazide  25 mg Oral Daily    losartan  100 mg Oral Daily    levothyroxine  25 mcg Oral Daily    montelukast  10 mg Oral Nightly    Semaglutide  3 mg Oral Daily    sodium chloride flush  5-40 mL Intravenous 2 times per day    enoxaparin  40 mg Subcutaneous Daily    sodium chloride flush  5-40 mL Intravenous 2 times per day    nicotine  1 patch Transdermal Daily     Continuous Infusions:   sodium chloride      sodium chloride      sodium chloride 25 mL (07/11/21 2230)    dextrose       CBC:   Recent Labs     07/13/21  1241 07/14/21  0558 07/15/21  0550   WBC 16.4* 18.0* 12.8*   HGB 17.1 17.1 17.8*    230 228     BMP:    Recent Labs     07/13/21  1241 07/14/21  0558 07/15/21  0550   * 131* 131*   K 4.4 4.3 3.6*   CL 91* 93* 90*   CO2 25 25 31   BUN 34* 30* 33*   CREATININE 1.16 0.92 0.99 GLUCOSE 243* 178* 147*     Hepatic: No results for input(s): AST, ALT, ALB, BILITOT, ALKPHOS in the last 72 hours. Troponin: No results for input(s): TROPHS in the last 72 hours. BNP: No results for input(s): BNP in the last 72 hours. Lipids: No results for input(s): CHOL, HDL in the last 72 hours. Invalid input(s): LDLCALCU  INR: No results for input(s): INR in the last 72 hours. ECHO 7/11/21   Summary  Normal left ventricular size with normal hyperdynamic function. EF 60-65%. Moderate to severe concentric left ventricular hypertrophy. Grade I (mild) left ventricular diastolic dysfunction. Posterior mitral annular calcification. No significant valvular regurgitation or stenosis seen. Objective:   Vitals: BP (!) 150/110   Pulse 69   Temp 98.1 °F (36.7 °C) (Oral)   Resp 16   Ht 6' (1.829 m)   Wt (!) 303 lb 9.6 oz (137.7 kg)   SpO2 99%   BMI 41.18 kg/m²   General appearance: alert and cooperative with exam  HEENT: Head: Normocephalic, no lesions, without obvious abnormality. Neck: no JVD, trachea midline, no adenopathy  Lungs: Clear to auscultation  Heart: Regular rate and rhythm, s1/s2 auscultated, no murmurs  Abdomen: soft, non-tender, bowel sounds active  Extremities: no edema  Neurologic: not done        Assessment / Acute Cardiac Problems:   1. HTN emergency   2. Atypical CP   3. DM   4. Diastolic CHF     Patient Active Problem List:     Tobacco abuse     Hyperlipidemia     COPD (chronic obstructive pulmonary disease) ([facility] [address]     Atypical chest pain     Accelerated hypertension     Noncompliance     Hypertensive emergency     Grade I diastolic dysfunction     Type 2 diabetes mellitus, without long-term current use of insulin ([facility] [address]     Hypertensive urgency      Plan of Treatment:   1. HTN. Improving. Will increase Norvasc to 10mg daily. Continue Coreg 25mg BID, Clonidine 0.1 mg BID, and Cozaar. Will d/c hctz. Start on PO lasix   2. Acute diastolic CHF. Improved. Switch IV lasix to PO. Continue BB/ARB   3. Atypical CP. Slightly elevated trops. Await Stress test results.   If negative for ischemia, ok to d/c home and will follow up as outpt in 2 weeks     Electronically signed by CASIMIRO Arango CNP on 7/15/2021 at 9:08 [facility].  [phone]

## 2021-07-15 NOTE — PROGRESS NOTES
Lexiscan stress test completed and reviewed by Dennis Carter, APRN-CNP. Patient experienced slight shortness of breath with symptom resolution shortly after and tolerated test well. PO caffeine provided. VS returned to baseline, see flow sheets for documented VS throughout procedure. Patient to N/M for further imaging at this time.

## 2021-07-15 NOTE — PLAN OF CARE
Problem: Falls - Risk of:  Goal: Will remain free from falls  Description: Will remain free from falls  7/15/2021 1317 by Anabel Wang RN  Outcome: Completed  7/15/2021 1316 by Anabel Wang RN  Outcome: Met This Shift  7/15/2021 0248 by Brooklyn Love RN  Outcome: Ongoing  Goal: Absence of physical injury  Description: Absence of physical injury  7/15/2021 1317 by Anabel Wnag RN  Outcome: Completed  7/15/2021 1316 by Anabel Wang RN  Outcome: Met This Shift  7/15/2021 0248 by Brooklyn Love RN  Outcome: Ongoing     Problem: OXYGENATION/RESPIRATORY FUNCTION  Goal: Patient will maintain patent airway  7/15/2021 1317 by Anabel Wang RN  Outcome: Completed  7/15/2021 1316 by Anabel Wang RN  Outcome: Met This Shift  7/15/2021 0248 by Brooklyn Love RN  Outcome: Ongoing  Goal: Patient will achieve/maintain normal respiratory rate/effort  Description: Respiratory rate and effort will be within normal limits for the patient  7/15/2021 1317 by Anabel Wang RN  Outcome: Completed  7/15/2021 1316 by Anabel Wang RN  Outcome: Met This Shift  7/15/2021 0248 by Brooklyn Love RN  Outcome: Ongoing     Problem: HEMODYNAMIC STATUS  Goal: Patient has stable vital signs and fluid balance  7/15/2021 1317 by Anabel Wang RN  Outcome: Completed  7/15/2021 1316 by Anabel Wang RN  Outcome: Met This Shift  7/15/2021 0248 by Brooklyn Love RN  Outcome: Ongoing     Problem: FLUID AND ELECTROLYTE IMBALANCE  Goal: Fluid and electrolyte balance are achieved/maintained  7/15/2021 1317 by Anabel Wang RN  Outcome: Completed  7/15/2021 1316 by Anabel Wang RN  Outcome: Met This Shift  7/15/2021 0248 by Brooklyn Love RN  Outcome: Ongoing     Problem: ACTIVITY INTOLERANCE/IMPAIRED MOBILITY  Goal: Mobility/activity is maintained at optimum level for patient  7/15/2021 1317 by Anabel Wang RN  Outcome: Completed  7/15/2021 1316 by Anabel Wang RN  Outcome: Met This Shift  7/15/2021 0248 by Roxanna Solano RN  Outcome: Ongoing     Problem: Cardiac:  Goal: Ability to maintain vital signs within normal range will improve  Description: Ability to maintain vital signs within normal range will improve  7/15/2021 1317 by Curvin Galeazzi, RN  Outcome: Completed  7/15/2021 1316 by Curvin Galeazzi, RN  Outcome: Met This Shift  7/15/2021 0248 by Roxanna Solano RN  Outcome: Ongoing  Goal: Cardiovascular alteration will improve  Description: Cardiovascular alteration will improve  7/15/2021 1317 by Curvin Galeazzi, RN  Outcome: Completed  7/15/2021 1316 by Curvin Galeazzi, RN  Outcome: Met This Shift  7/15/2021 0248 by Roxanna Solano RN  Outcome: Ongoing     Problem: Health Behavior:  Goal: Will modify at least one risk factor affecting health status  Description: Will modify at least one risk factor affecting health status  7/15/2021 1317 by Curvin Galeazzi, RN  Outcome: Completed  7/15/2021 1316 by Curvin Galeazzi, RN  Outcome: Met This Shift  7/15/2021 0248 by Roxanna Solano RN  Outcome: Ongoing  Goal: Identification of resources available to assist in meeting health care needs will improve  Description: Identification of resources available to assist in meeting health care needs will improve  7/15/2021 1317 by Curvin Galeazzi, RN  Outcome: Completed  7/15/2021 1316 by Curvin Galeazzi, RN  Outcome: Met This Shift  7/15/2021 0248 by Roxanna Solano RN  Outcome: Ongoing     Problem: Physical Regulation:  Goal: Complications related to the disease process, condition or treatment will be avoided or minimized  Description: Complications related to the disease process, condition or treatment will be avoided or minimized  7/15/2021 1317 by Curvin Galeazzi, RN  Outcome: Completed  7/15/2021 1316 by Curvin Galeazzi, RN  Outcome: Met This Shift  7/15/2021 0248 by Roxanna Solano RN  Outcome: Ongoing     Problem: Skin Integrity:  Goal: Will show no infection signs and symptoms  Description: Will show no infection signs and symptoms  7/15/2021 1317 by Alea Alicia RN  Outcome: Completed  7/15/2021 1316 by Alea Alicia RN  Outcome: Met This Shift  7/15/2021 0248 by Mandeep Cruz RN  Outcome: Ongoing  Goal: Absence of new skin breakdown  Description: Absence of new skin breakdown  7/15/2021 1317 by Alea Alicia RN  Outcome: Completed  7/15/2021 1316 by Alea Alicia RN  Outcome: Met This Shift  7/15/2021 0248 by Mandeep Cruz RN  Outcome: Ongoing     Problem: Respiratory:  Goal: Ability to maintain adequate ventilation will improve  Description: Ability to maintain adequate ventilation will improve  7/15/2021 1317 by Alea Alicia RN  Outcome: Completed  7/15/2021 1316 by Alea Alicia RN  Outcome: Met This Shift  7/15/2021 0248 by Mandeep Cruz RN  Outcome: Ongoing

## 2021-07-15 NOTE — PLAN OF CARE
of resources available to assist in meeting health care needs will improve  7/15/2021 0248 by Ofelia Vogt RN  Outcome: Ongoing     Problem: Physical Regulation:  Goal: Complications related to the disease process, condition or treatment will be avoided or minimized  Description: Complications related to the disease process, condition or treatment will be avoided or minimized  7/15/2021 0248 by Ofelia Vogt RN  Outcome: Ongoing    Problem: Skin Integrity:  Goal: Will show no infection signs and symptoms  Description: Will show no infection signs and symptoms  7/15/2021 0248 by Ofelia Vogt RN  Outcome: Ongoing  Goal: Absence of new skin breakdown  Description: Absence of new skin breakdown  7/15/2021 0248 by Ofelia Vogt RN  Outcome: Ongoing    Problem: Respiratory:  Goal: Ability to maintain adequate ventilation will improve  Description: Ability to maintain adequate ventilation will improve  7/15/2021 0248 by Ofelia Vogt RN  Outcome: Ongoing

## 2021-07-20 ENCOUNTER — OFFICE VISIT (OUTPATIENT)
Dept: PRIMARY CARE CLINIC | Age: 51
End: 2021-07-20

## 2021-07-20 VITALS
SYSTOLIC BLOOD PRESSURE: 168 MMHG | BODY MASS INDEX: 40.56 KG/M2 | OXYGEN SATURATION: 94 % | HEART RATE: 103 BPM | HEIGHT: 73 IN | DIASTOLIC BLOOD PRESSURE: 112 MMHG | WEIGHT: 306 LBS

## 2021-07-20 DIAGNOSIS — I10 ACCELERATED HYPERTENSION: ICD-10-CM

## 2021-07-20 DIAGNOSIS — E11.9 TYPE 2 DIABETES MELLITUS WITHOUT COMPLICATION, WITHOUT LONG-TERM CURRENT USE OF INSULIN (HCC): ICD-10-CM

## 2021-07-20 DIAGNOSIS — Z13.31 POSITIVE DEPRESSION SCREENING: Primary | ICD-10-CM

## 2021-07-20 DIAGNOSIS — I50.9 ACUTE ON CHRONIC CONGESTIVE HEART FAILURE, UNSPECIFIED HEART FAILURE TYPE (HCC): ICD-10-CM

## 2021-07-20 DIAGNOSIS — J41.0 SIMPLE CHRONIC BRONCHITIS (HCC): ICD-10-CM

## 2021-07-20 DIAGNOSIS — E03.9 HYPOTHYROIDISM, UNSPECIFIED TYPE: ICD-10-CM

## 2021-07-20 DIAGNOSIS — B35.1 ONYCHOMYCOSIS: ICD-10-CM

## 2021-07-20 PROCEDURE — 99204 OFFICE O/P NEW MOD 45 MIN: CPT | Performed by: NURSE PRACTITIONER

## 2021-07-20 PROCEDURE — G8431 POS CLIN DEPRES SCRN F/U DOC: HCPCS | Performed by: NURSE PRACTITIONER

## 2021-07-20 PROCEDURE — 3051F HG A1C>EQUAL 7.0%<8.0%: CPT | Performed by: NURSE PRACTITIONER

## 2021-07-20 PROCEDURE — 1111F DSCHRG MED/CURRENT MED MERGE: CPT | Performed by: NURSE PRACTITIONER

## 2021-07-20 RX ORDER — EMPAGLIFLOZIN 10 MG/1
10 TABLET, FILM COATED ORAL DAILY
Qty: 30 TABLET | Refills: 0
Start: 2021-07-20 | End: 2021-07-26 | Stop reason: SDUPTHER

## 2021-07-20 RX ORDER — DULOXETIN HYDROCHLORIDE 60 MG/1
60 CAPSULE, DELAYED RELEASE ORAL 2 TIMES DAILY
Qty: 30 CAPSULE | Refills: 0 | Status: SHIPPED | OUTPATIENT
Start: 2021-07-20

## 2021-07-20 RX ORDER — CLONIDINE HYDROCHLORIDE 0.2 MG/1
0.2 TABLET ORAL 2 TIMES DAILY
Qty: 60 TABLET | Refills: 2 | Status: SHIPPED | OUTPATIENT
Start: 2021-07-20

## 2021-07-20 RX ORDER — TERBINAFINE HYDROCHLORIDE 250 MG/1
250 TABLET ORAL 2 TIMES DAILY
Qty: 1 TABLET | Refills: 0
Start: 2021-07-20

## 2021-07-20 RX ORDER — ALBUTEROL SULFATE 1.25 MG/3ML
1 SOLUTION RESPIRATORY (INHALATION) EVERY 6 HOURS PRN
COMMUNITY

## 2021-07-20 SDOH — ECONOMIC STABILITY: FOOD INSECURITY: WITHIN THE PAST 12 MONTHS, YOU WORRIED THAT YOUR FOOD WOULD RUN OUT BEFORE YOU GOT MONEY TO BUY MORE.: NEVER TRUE

## 2021-07-20 SDOH — ECONOMIC STABILITY: FOOD INSECURITY: WITHIN THE PAST 12 MONTHS, THE FOOD YOU BOUGHT JUST DIDN'T LAST AND YOU DIDN'T HAVE MONEY TO GET MORE.: NEVER TRUE

## 2021-07-20 ASSESSMENT — ENCOUNTER SYMPTOMS
BACK PAIN: 1
COLOR CHANGE: 1
EYE PAIN: 0
SHORTNESS OF BREATH: 1
SINUS PAIN: 0
NAUSEA: 0
CONSTIPATION: 0
DIARRHEA: 0
COUGH: 1
EYE REDNESS: 0

## 2021-07-20 ASSESSMENT — PATIENT HEALTH QUESTIONNAIRE - PHQ9
4. FEELING TIRED OR HAVING LITTLE ENERGY: 3
SUM OF ALL RESPONSES TO PHQ9 QUESTIONS 1 & 2: 6
3. TROUBLE FALLING OR STAYING ASLEEP: 3
SUM OF ALL RESPONSES TO PHQ QUESTIONS 1-9: 19
8. MOVING OR SPEAKING SO SLOWLY THAT OTHER PEOPLE COULD HAVE NOTICED. OR THE OPPOSITE, BEING SO FIGETY OR RESTLESS THAT YOU HAVE BEEN MOVING AROUND A LOT MORE THAN USUAL: 2
6. FEELING BAD ABOUT YOURSELF - OR THAT YOU ARE A FAILURE OR HAVE LET YOURSELF OR YOUR FAMILY DOWN: 0
SUM OF ALL RESPONSES TO PHQ QUESTIONS 1-9: 19
2. FEELING DOWN, DEPRESSED OR HOPELESS: 3
5. POOR APPETITE OR OVEREATING: 3
7. TROUBLE CONCENTRATING ON THINGS, SUCH AS READING THE NEWSPAPER OR WATCHING TELEVISION: 2
10. IF YOU CHECKED OFF ANY PROBLEMS, HOW DIFFICULT HAVE THESE PROBLEMS MADE IT FOR YOU TO DO YOUR WORK, TAKE CARE OF THINGS AT HOME, OR GET ALONG WITH OTHER PEOPLE: 2
9. THOUGHTS THAT YOU WOULD BE BETTER OFF DEAD, OR OF HURTING YOURSELF: 0
1. LITTLE INTEREST OR PLEASURE IN DOING THINGS: 3
SUM OF ALL RESPONSES TO PHQ QUESTIONS 1-9: 19

## 2021-07-20 ASSESSMENT — COLUMBIA-SUICIDE SEVERITY RATING SCALE - C-SSRS
1. WITHIN THE PAST MONTH, HAVE YOU WISHED YOU WERE DEAD OR WISHED YOU COULD GO TO SLEEP AND NOT WAKE UP?: NO
2. HAVE YOU ACTUALLY HAD ANY THOUGHTS OF KILLING YOURSELF?: NO
6. HAVE YOU EVER DONE ANYTHING, STARTED TO DO ANYTHING, OR PREPARED TO DO ANYTHING TO END YOUR LIFE?: NO

## 2021-07-20 ASSESSMENT — SOCIAL DETERMINANTS OF HEALTH (SDOH): HOW HARD IS IT FOR YOU TO PAY FOR THE VERY BASICS LIKE FOOD, HOUSING, MEDICAL CARE, AND HEATING?: SOMEWHAT HARD

## 2021-07-20 NOTE — PROGRESS NOTES
Post-Discharge Transitional Care Management Services or Hospital Follow Up      Ada Catherine   YOB: 1970    Date of Office Visit:  7/20/2021  Date of Hospital Admission: 7/11/21  Date of Hospital Discharge: 7/15/21  Readmission Risk Score(high >=14%.  Medium >=10%):Readmission Risk Score: 19      Care management risk score Rising risk (score 2-5) and Complex Care (Scores >=6): 2     Non face to face  following discharge, date last encounter closed (first attempt may have been earlier): *No documented post hospital discharge outreach found in the last 14 days *No documented post hospital discharge outreach found in the last 14 days    Call initiated 2 business days of discharge: *No response recorded in the last 14 days     Patient Active Problem List   Diagnosis    Tobacco abuse    Hyperlipidemia    COPD (chronic obstructive pulmonary disease) ([facility] [address]    Atypical chest pain    Accelerated hypertension    Noncompliance    Hypertensive emergency    Grade I diastolic dysfunction    Type 2 diabetes mellitus, without long-term current use of insulin ([facility] [address]    Hypertensive urgency    CHF (congestive heart failure) ([facility] [address]       Allergies   Allergen Reactions    Pcn [Penicillins] Anaphylaxis     From childhood       Medications listed as ordered at the time of discharge from hospitals   [facility] KELVIN:    Printed on:07/20/21 1033   Medication Information                      albuterol (ACCUNEB) 1.25 MG/3ML nebulizer solution  Inhale 1 ampule into the lungs every 6 hours as needed             ALBUTEROL IN  Inhale into the lungs every 4 hours as needed              amLODIPine (NORVASC) 10 MG tablet  Take 1 tablet by mouth daily             atorvastatin (LIPITOR) 40 MG tablet  Take 40 mg by mouth daily             buPROPion (WELLBUTRIN XL) 300 MG extended release tablet  Take 300 mg by mouth every morning             carvedilol (COREG) 25 MG tablet  Take 25 mg by mouth 2 times daily             cloNIDine (CATAPRES) 0.2 MG tablet  Take 1 tablet by mouth 2 times daily             DULoxetine (CYMBALTA) 60 MG extended release capsule  Take 1 capsule by mouth 2 times daily             empagliflozin (JARDIANCE) 10 MG tablet  Take 1 tablet by mouth daily             fluticasone-vilanterol (BREO ELLIPTA) 100-25 MCG/INH AEPB inhaler  Inhale 1 puff into the lungs daily             furosemide (LASIX) 40 MG tablet  Take 1 tablet by mouth daily             levothyroxine (SYNTHROID) 25 MCG tablet  Take 25 mcg by mouth Daily             losartan (COZAAR) 100 MG tablet  Take 1 tablet by mouth daily             montelukast (SINGULAIR) 10 MG tablet  Take 1 tablet by mouth nightly             predniSONE (DELTASONE) 20 MG tablet  Take 2 tablets by mouth daily for 4 days             Semaglutide 3 MG TABS  Take 3 mg by mouth daily             terbinafine (LAMISIL) 250 MG tablet  Take 1 tablet by mouth 2 times daily                   Medications marked \"taking\" at this time  Outpatient Medications Marked as Taking for the 7/20/21 encounter (Office Visit) with CASIMIRO Callejas CNP   Medication Sig Dispense Refill    albuterol (ACCUNEB) 1.25 MG/3ML nebulizer solution Inhale 1 ampule into the lungs every 6 hours as needed      terbinafine (LAMISIL) 250 MG tablet Take 1 tablet by mouth 2 times daily 1 tablet 0    empagliflozin (JARDIANCE) 10 MG tablet Take 1 tablet by mouth daily 30 tablet 0    Semaglutide 3 MG TABS Take 3 mg by mouth daily 30 tablet 0    DULoxetine (CYMBALTA) 60 MG extended release capsule Take 1 capsule by mouth 2 times daily 30 capsule 0    cloNIDine (CATAPRES) 0.2 MG tablet Take 1 tablet by mouth 2 times daily 60 tablet 2    losartan (COZAAR) 100 MG tablet Take 1 tablet by mouth daily 30 tablet 3    montelukast (SINGULAIR) 10 MG tablet Take 1 tablet by mouth nightly 30 tablet 3    amLODIPine (NORVASC) 10 MG tablet Take 1 tablet by mouth daily 30 tablet 3    fluticasone-vilanterol (BREO ELLIPTA) 100-25 MCG/INH AEPB inhaler Inhale 1 puff into the lungs daily 30 each 0    furosemide (LASIX) 40 MG tablet Take 1 tablet by mouth daily 60 tablet 3    buPROPion (WELLBUTRIN XL) 300 MG extended release tablet Take 300 mg by mouth every morning      ALBUTEROL IN Inhale into the lungs every 4 hours as needed           Medications patient taking as of now reconciled against medications ordered at time of hospital discharge: Yes    Chief Complaint   Patient presents with    New Patient    Other     referral to cardiology for CHF; order for sleep study (has CPAP that does not work - needs new machine)     Ankle Pain     uses THC edibles for ankle pain    Follow-Up from Hospital       He states 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 When blood pressure gets real high he gets headache. States before hospitalization he had more HA, and increased fatigue and chest discomfort. He is not taking anything for diabetes. He does have a glucometer but does not use it - difficulty figuring  out how it works. Before going into hospital he had did not know he had CHF,. He states he was swelled up, SOB, and nose and feet purple. States they drained out a lot of fluid. Left ankle pain, swells up and pain is intense, some times it give out. States it has been painful for almost 20 years. He broke it almost 20 years ago, wrong place wrong time. THC does help. HPI    Inpatient course: Discharge summary reviewed- see chart. Interval history/Current status: blood pressure remains high but patient states he feels better then he has for months    Review of Systems   Constitutional: Positive for fatigue. Negative for chills and fever. HENT: Negative for congestion and sinus pain. Eyes: Negative for pain and redness. Respiratory: Positive for cough and shortness of breath. Cardiovascular: Positive for leg swelling. Negative for chest pain and palpitations. Gastrointestinal: Negative for constipation, diarrhea and nausea. Genitourinary: Negative for difficulty urinating and dysuria. Musculoskeletal: Positive for back pain and myalgias. Negative for gait problem. Skin: Positive for color change. Negative for rash and wound. Neurological: Positive for headaches. Negative for dizziness. Psychiatric/Behavioral: Positive for dysphoric mood and sleep disturbance. The patient is not nervous/anxious. Vitals:    21 0948 21 1016   BP: (!) 172/110 (!) 168/112   Pulse: 103    SpO2: 94%    Weight: (!) 306 lb (138.8 kg)    Height: 6' 1.25\" (1.861 m)      Body mass index is 40.1 kg/m². Wt Readings from Last 3 Encounters:   21 (!) 306 lb (138.8 kg)   21 (!) 303 lb 9.6 oz (137.7 kg)   17 287 lb 1.6 oz (130.2 kg)     BP Readings from Last 3 Encounters:   21 (!) 168/112   07/15/21 130/84   17 (!) 158/102       Physical Exam  Vitals and nursing note reviewed. Constitutional:       Appearance: He is obese. HENT:      Head: Normocephalic and atraumatic. Right Ear: Tympanic membrane, ear canal and external ear normal.      Left Ear: Tympanic membrane, ear canal and external ear normal.      Nose: Nose normal.   Eyes:      Extraocular Movements: Extraocular movements intact. Pupils: Pupils are equal, round, and reactive to light. Cardiovascular:      Rate and Rhythm: Normal rate and regular rhythm. Pulses:           Dorsalis pedis pulses are 0 on the right side and 0 on the left side. Posterior tibial pulses are 0 on the right side and 0 on the left side. Heart sounds: Normal heart sounds. Comments: No carotid bruit  Pulmonary:      Effort: Pulmonary effort is normal.      Breath sounds: Normal breath sounds. Abdominal:      General: Bowel sounds are normal.      Palpations: Abdomen is soft.    Musculoskeletal:      Right lower le+ Pitting Edema Refill: 0  - Semaglutide 3 MG TABS; Take 3 mg by mouth daily  Dispense: 30 tablet; Refill: 0    5. Acute on chronic congestive heart failure, unspecified heart failure type (HCC)  Edema +1  - Basic Metabolic Panel; Future  - External Referral To Cardiology    6. Hypothyroidism, unspecified type  History of levothyroxine use  - TSH; Future  - T4, Free; Future    7. Onychomycosis  Toe nails thick  - terbinafine (LAMISIL) 250 MG tablet; Take 1 tablet by mouth 2 times daily  Dispense: 1 tablet;  Refill: 0        Medical Decision Making: high complexity

## 2021-07-21 ENCOUNTER — OFFICE VISIT (OUTPATIENT)
Dept: PULMONOLOGY | Age: 51
End: 2021-07-21
Payer: MEDICAID

## 2021-07-21 VITALS
WEIGHT: 304 LBS | DIASTOLIC BLOOD PRESSURE: 86 MMHG | HEART RATE: 93 BPM | TEMPERATURE: 97 F | OXYGEN SATURATION: 94 % | BODY MASS INDEX: 41.17 KG/M2 | SYSTOLIC BLOOD PRESSURE: 123 MMHG | HEIGHT: 72 IN | RESPIRATION RATE: 16 BRPM

## 2021-07-21 DIAGNOSIS — J44.9 COPD, SEVERITY TO BE DETERMINED (HCC): Primary | ICD-10-CM

## 2021-07-21 DIAGNOSIS — G47.33 OSA (OBSTRUCTIVE SLEEP APNEA): ICD-10-CM

## 2021-07-21 PROCEDURE — 99214 OFFICE O/P EST MOD 30 MIN: CPT | Performed by: INTERNAL MEDICINE

## 2021-07-21 RX ORDER — FLUTICASONE FUROATE AND VILANTEROL 100; 25 UG/1; UG/1
1 POWDER RESPIRATORY (INHALATION) DAILY
Qty: 30 EACH | Refills: 0 | Status: SHIPPED
Start: 2021-07-21 | End: 2021-08-02

## 2021-07-21 ASSESSMENT — SLEEP AND FATIGUE QUESTIONNAIRES
HOW LIKELY ARE YOU TO NOD OFF OR FALL ASLEEP WHILE SITTING QUIETLY AFTER LUNCH WITHOUT ALCOHOL: 3
HOW LIKELY ARE YOU TO NOD OFF OR FALL ASLEEP WHILE WATCHING TV: 2
HOW LIKELY ARE YOU TO NOD OFF OR FALL ASLEEP WHEN YOU ARE A PASSENGER IN A CAR FOR AN HOUR WITHOUT A BREAK: 3
ESS TOTAL SCORE: 18
HOW LIKELY ARE YOU TO NOD OFF OR FALL ASLEEP WHILE SITTING AND TALKING TO SOMEONE: 1
HOW LIKELY ARE YOU TO NOD OFF OR FALL ASLEEP WHILE SITTING AND READING: 3
HOW LIKELY ARE YOU TO NOD OFF OR FALL ASLEEP IN A CAR, WHILE STOPPED FOR A FEW MINUTES IN TRAFFIC: 0
HOW LIKELY ARE YOU TO NOD OFF OR FALL ASLEEP WHILE LYING DOWN TO REST IN THE AFTERNOON WHEN CIRCUMSTANCES PERMIT: 3
HOW LIKELY ARE YOU TO NOD OFF OR FALL ASLEEP WHILE SITTING INACTIVE IN A PUBLIC PLACE: 3

## 2021-07-21 NOTE — PATIENT INSTRUCTIONS
Call [facility] by end of this month if you have not received a call from [facility]- [phone].   Printed AVS for patient    maxx

## 2021-07-22 NOTE — PROGRESS NOTES
PULMONARY MEDICINE OUTPATIENT  FOLLOW UP NOTE                                                                       PATIENT:  Sherry Diallo  YOB: 1970  MRN: [id number]     REFERRED BY: CASIMIRO Georges CNP   CHIEF COMPLIANT: Congestive Heart Failure (hospital follow up)       HISTORY     HISTORY OF PRESENT ILLNESS:   Sherry Diallo is a 46y.o. year old male here for follow-up    COPD/obstructive sleep apnea:  Patient  reports that he has been smoking cigarettes. He has a 30.00 pack-year smoking history. He has never used smokeless tobacco.  He was recently hospitalized with worsening shortness of breath, cough, sputum and wheezing and was treated for acute exacerbation of COPD/CHF. He states that he is cut down on smoking and 1 pack would last him 3 days. He reports improvement in his symptoms since discharge from the hospital.  He is morbidly obese and known to have obstructive sleep apnea. Reports that he had a home sleep study several years back, results are unavailable for review. Reports suboptimal compliance to CPAP, compliance data unavailable. He also has a history of alcohol and marijuana abuse. He has grade 2 exertional dyspnea, chronic cough and sputum. He was discharged on Breo Ellipta, Singulair and as needed albuterol.        PAST MEDICAL HISTORY:      Diagnosis Date    Accelerated hypertension 8/12/2017    Arthritis     Atypical chest pain 8/12/2017    CHF (congestive heart failure) (HCC)     COPD (chronic obstructive pulmonary disease) (HCC)     Diabetes mellitus (HCC)     Grade I diastolic dysfunction 3/95/2766    Hyperlipidemia     Hypertension     Hypertensive emergency 7/11/2021    Hypertensive urgency     Noncompliance 8/12/2017    Restless legs syndrome     Sleep apnea     Tobacco abuse 8/12/2017    Type 2 diabetes mellitus without complication (HCC)     Type 2 diabetes mellitus, without long-term current use of insulin ([facility] [address] 7/12/2021     PAST SURGICAL HISTORY:      Procedure Laterality Date    ANKLE SURGERY       SOCIAL HISTORY:  TOBACCO:   reports that he has been smoking cigarettes. He has a 30.00 pack-year smoking history. He has never used smokeless tobacco.  ETOH:   reports current alcohol use of about 13.0 - 14.0 standard drinks of alcohol per week. DRUGS: reports current drug use. Frequency: 2.00 times per week. Drug: Marijuana.      AVOCATION/OCCUPATIONAL EXPOSURE:  [] Asbestos      [] Hot tub  [] Silica dust    [] Pets  [] Coal            [] Exotic birds  [] [employer]       [] Tuberculosis  [] Barnie Snare         [] Others  [] [place]          PHYSICAL EXAMINATION      VITAL SIGNS:   /86 (Site: Right Upper Arm, Position: Sitting)   Pulse 93   Temp 97 °F (36.1 °C) (Temporal)   Resp 16   Ht 6' (1.829 m)   Wt (!) 304 lb (137.9 kg)   SpO2 94%   BMI 41.23 kg/m²   Wt Readings from Last 3 Encounters:   08/01/21 (!) 304 lb (137.9 kg)   08/02/21 (!) 300 lb 6.4 oz (136.3 kg)   07/28/21 (!) 303 lb (137.4 kg)     SYSTEMIC EXAMINATION:   General appearance -  [x] well appearing  [] overweight  [x] morbidly obese [] cachectic [x] comfortable  [x] in no acute distress  [] chronically ill-appearing  [] in mild to moderate respiratory distress   Mental status -  [x] alert  [x] oriented to person, place, and time  [] anxious  [] depressed mood    Head-  [x] atraumatic  [x] normocephalic   Eyes -  [] pupils equal and reactive, extraocular eye movements intact  [] sclera anicteric   Ears -  [x] hearing grossly normal bilaterally [] bilateral TM's and external ear canals normal   Nose -  [x] normal and patent [] no erythema  [] discharge   Mouth -  [x] mucous membranes moist  [] pharynx normal without lesions [] erythematous  [] exudate noted   Mallampati Airway Score -  [] I (soft palate, uvula, fauces, tonsillar pillars visible) [] II (soft palate, uvula, fauces visible) []  III (soft palate, base of uvula visible) [] IV (only hard palate visible)   Neck -  [] supple  [] no significant adenopathy  [] carotids upstroke normal bilaterally [] no JVD  [] no bruit   Lymphatics -  [x] no palpable lymphadenopathy  [] no hepatosplenomegaly   Chest -   [x] normal chest excursion  [x] no chest wall tenderness  [] increased AP diameter[] pectus noted [] scoliosis noted [x] no tachypnea retraction or cyanosis [] clear to auscultation, no wheezes, rales or rhonchi, symmetric air entry  [] wheezing noted  [] rales noted  []rhonchi noted [x] decreased air entry noted bilaterally   Heart -  [x] normal rate,  [x] regular rhythm  [] irregularly irregular rhythm [x] normal S1  [x] normal S2  [x] no murmurs, rubs, clicks or gallops  [] S3 present  [] S4 present  [] systolic murmur  [] diastolic murmur  [] midsystolic click []pericardial rub present    Abdomen -  [] soft [] nontender  [] nondistended  [] no masses or organomegaly   Neurological -  [x] normal speech  [x] no focal findings or movement disorder noted  [] cranial nerves II through XII intact  [] DTR's normal and symmetric  [] Babinski sign negative,  [x] motor and sensory grossly normal bilaterally  [] normal muscle tone [x] no tremors  [] strength 5/5  [] Romberg sign negative [] normal gait    Musculoskeletal -  [x] no joint tenderness [x] no deformity or swelling   Extremities - [x] no clubbing or cyanosis,  [x] no pedal edema [] pedal edema  [] intact peripheral pulses   Skin -  [x] normal coloration and turgor  [x] no rashes  [] no suspicious skin lesions noted          MEDICAL DECISION MAKING     1. PROBLEMS ADDRESSED (NUMBER AND COMPLEXITY)       ICD-10-CM    1. COPD, severity to be determined ([facility] [address]  J44.9 Full PFT Study With Bronchodilator     DISCONTINUED: fluticasone-vilanterol (BREO ELLIPTA) 100-25 MCG/INH AEPB inhaler   2. JONA (obstructive sleep apnea)  G47.33 Baseline Diagnostic Sleep Study     Sleep Study with PAP Titration        2.  DATA REVIEWED AND ANALYZED (AMOUNT AND/OR increase or decrease the risk assessment reported for this examination. Risk stratification criteria are adapted from \"Noninvasive Risk Stratification\" criteria from [facility]. Al, ACC/AATS/AHA/ASE/ASNC/SCAI/SCCT/STS 2017 Appropriate Use Criteria For Coronary Revascularization in Patients With Stable Ischemic Heart Disease [facility] Volume 69, Issue 17, May 2017 High risk (>3% annual death or MI) 1. Severe resting LV dysfunction (LVEF <35%) not readily explained by non coronary causes 2. Resting perfusion abnormalities greater than 10% of the myocardium in patients without prior history or evidence of MI3. Stress-induced perfusion abnormalities encumbering greater than or equal to 10% myocardium or stress segmental scores indicating multiple vascular territories with abnormalities 4. Stress-induced LV dilatation (TID ratio greater than 1.19 for exercise and greater than 1.39 for regadenoson) Intermediate risk (1% to 3% annual death or MI) 1. Mild/moderate resting LV dysfunction (LVEF 35% to 49%) not readily explained by non coronary causes. 2. Resting perfusion abnormalities in 5%-9.9% of the myocardium in patients without a history or prior evidence of MI 3. Stress-induced perfusion abnormality encumbering 5%-9.9% of the myocardium or stress segmental scores indicating 1 vascular territory with abnormalities but without LV dilation 4. Small wall motion abnormality involving 1-2 segments and only 1 coronary bed. Low Risk (Less than 1% annual death or MI) 1. Normal or small myocardial perfusion defect at rest or with stress encumbering less than 5% of the myocardium. Chest x-ray:  CT chest: 8/11/2017  Impression   Multiple enlarged mediastinum lymph nodes along the AP window as measured   above, otherwise unremarkable CTA of the chest with normal appearing aorta.      PET scan:    ECHOCARDIOGRAM:  Results for orders placed during the hospital encounter of 07/11/21    ECHO Complete 2D W Doppler W Color    Narrative  Summary  Normal left ventricular size with normal hyperdynamic function. EF 60-65%. Moderate to severe concentric left ventricular hypertrophy. Grade I (mild) left ventricular diastolic dysfunction. Posterior mitral annular calcification. No significant valvular regurgitation or stenosis seen.     PULMONARY FUNCTION TEST:  [] Ordered  [x] Unavailable  [] Reviewed    6 MINUTE WALK TEST:  [] Ordered  [x] Unavailable  [] Reviewed    Distance Walk Predicted Distance LLN** Predicted % Duration (min) Duration of Stops Sec Lee Dyspnea Lee Fatigue               POLYSOMNOGRAM:  BASELINE SLEEP STUDY:   [] Ordered  [x] Unavailable  [] Reviewed    TITRATION STUDY:   [] Ordered  [] Unavailable  [] Reviewed    CPAP/BIPAP COMPLIANCE DATA:   [x] Unavailable  [] Reviewed    ASSESSMENT OF EXCESSIVE DAYTIME SLEEPINESS (BY INDEPENDENT HISTORIAN):  [place] Sleepiness Scale:  [] Not obtained  [x] Obtained  Sleep Medicine 7/21/2021   Sitting and reading 3   Watching TV 2   Sitting, inactive in a public place (e.g. a theatre or a meeting) 3   As a passenger in a car for an hour without a break 3   Lying down to rest in the afternoon when circumstances permit 3   Sitting and talking to someone 1   Sitting quietly after a lunch without alcohol 3   In a car, while stopped for a few minutes in traffic 0   Total score 18        PRIOR EXTERNAL NOTES:  [] Unavailable  [x] Reviewed    ORDERS PLACED:  Orders Placed This Encounter   Procedures    Full PFT Study With Bronchodilator     If an ABG is needed along with this PFT procedure, please place the appropriate lab order     Standing Status:   Future     Standing Expiration Date:   7/21/2022    Baseline Diagnostic Sleep Study     Standing Status:   Future     Number of Occurrences:   1     Standing Expiration Date:   1/21/2022     Order Specific Question:   Adult or Pediatric     Answer:   Adult Study (>7 Years)     Order Specific Question:   Location For Sleep Study Answer: [facility] Specific Question:   [facility]     Answer:   [employer]     Order Specific Question:   Pre-Study Patient Questions: Answer:   Snores loudly during sleep     Order Specific Question:   Pre-Study Patient Questions: Answer:   Reports multiple awakenings throughout the night    Sleep Study with PAP Titration     If needed based on baseline study     Standing Status:   Future     Standing Expiration Date:   1/21/2022     Order Specific Question:   Sleep Study Titration Type     Answer:   CPAP     Order Specific Question:   Location For Sleep Study     Answer: [facility] Specific Question:   [facility] Location     Answer:   [employer]     Order Specific Question:   Pre-Study Patient Questions: Answer:   Reports multiple awakenings throughout the night     Order Specific Question:   Pre-Study Patient Questions: Answer:   Snores loudly during sleep        3.  RISK OF COMPLICATIONS AND/OR MORBIDITY OR MORTALITY:     ALLERGIES:  Allergies   Allergen Reactions    Pcn [Penicillins] Anaphylaxis     From childhood      MEDICATIONS:  Outpatient Medications Prior to Visit   Medication Sig Dispense Refill    albuterol (ACCUNEB) 1.25 MG/3ML nebulizer solution Inhale 1 ampule into the lungs every 6 hours as needed      terbinafine (LAMISIL) 250 MG tablet Take 1 tablet by mouth 2 times daily 1 tablet 0    DULoxetine (CYMBALTA) 60 MG extended release capsule Take 1 capsule by mouth 2 times daily 30 capsule 0    cloNIDine (CATAPRES) 0.2 MG tablet Take 1 tablet by mouth 2 times daily 60 tablet 2    empagliflozin (JARDIANCE) 10 MG tablet Take 1 tablet by mouth daily 30 tablet 0    Semaglutide 3 MG TABS Take 3 mg by mouth daily 30 tablet 0    losartan (COZAAR) 100 MG tablet Take 1 tablet by mouth daily 30 tablet 3    montelukast (SINGULAIR) 10 MG tablet Take 1 tablet by mouth nightly 30 tablet 3    amLODIPine (NORVASC) 10 MG tablet Take 1 tablet by mouth daily 30 tablet 3    furosemide (LASIX) 40 MG tablet Take 1 tablet by mouth daily 60 tablet 3    carvedilol (COREG) 25 MG tablet Take 25 mg by mouth 2 times daily       buPROPion (WELLBUTRIN XL) 300 MG extended release tablet Take 300 mg by mouth every morning      ALBUTEROL IN Inhale into the lungs every 4 hours as needed       atorvastatin (LIPITOR) 40 MG tablet Take 40 mg by mouth daily       levothyroxine (SYNTHROID) 25 MCG tablet Take 25 mcg by mouth Daily       fluticasone-vilanterol (BREO ELLIPTA) 100-25 MCG/INH AEPB inhaler Inhale 1 puff into the lungs daily 30 each 0     No facility-administered medications prior to visit. PRESCRIPTION DRUG MANAGEMENT/RECOMMENDATIONS:  [employer] Ordered PFT   Recommended continuing Breo, Singulair and as needed albuterol   Current treatment plan is effective, no change in therapy   Reviewed use of rescue vs controlling agents and potential side effects   Reviewed use, techniques, schedule and side effects of all inhaled medications   Refills were provided as requested   Barriers to medication compliance addressed.  Patient to have prednisone and an antibiotic available for use during an exacerbation    SUPPLEMENTAL OXYGEN/NIV RECOMMENDATIONS:   Patient is not on home oxygen therapy. PULMONARY REHABILITATION:   Pulmonary rehabilitation was discussed and  information provided regarding the potential benefits including decrease in the hospitalization risk and improvement in dyspnea.     SLEEP DISORDER RECOMMENDATIONS:   Patient reports that he was diagnosed with sleep apnea on home sleep study few years back   Reports poor compliance to the CPAP machine   Ordered repeat sleep study and retitration   Discussed importance of compliance to CPAP   CPAP to be used at least 4 hours every night   Weight loss recommended   Patient not to drive or operate heavy machinery, if sleepy    SMOKING CESSATION RECOMMENDATIONS/COUNSELING:   Recommended smoking cessation    LIFESTYLE MODIFICATION RECOMMENDATIONS/COUNSELING:   Follow healthy behaviors: nutrition, exercise and medication adherence   Maintain an active lifestyle    LUNG CANCER SCREENING/OTHER IMAGING RECOMMENDATIONS:   After reviewing the patient's smoking history, age and other clinical criteria, the LOW DOSE CT is : NOT INDICATED; Age is < 54 or > 77 years   We will consider repeating CT scan at next clinic visit to follow-up on mediastinal adenopathy on a CT scan done in 2017    IMMUNIZATION HISTORY/RECOMMENDATIONS:    There is no immunization history on file for this patient.  Recommend influenza vaccination in the fall annually    Recommendations were given regarding pneumococcal vaccination   Recommendations were given regarding COVID19 vaccination     All the questions that the patient had were answered to his satisfaction  We'll see the patient back in two months  Thank you for having us involved in the care of your patient. Please call us if you have any questions or concerns. Belen Guevara MD  Pulmonary and Critical Care Medicine            Please note that this chart was generated using voice recognition Dragon dictation software. Although every effort was made to ensure the accuracy of this automated transcription, some errors in transcription may have occurred.

## 2021-07-23 ENCOUNTER — HOSPITAL ENCOUNTER (OUTPATIENT)
Dept: LAB | Age: 51
Setting detail: SPECIMEN
Discharge: HOME | End: 2021-07-23
Payer: MEDICAID

## 2021-07-23 DIAGNOSIS — Z20.822 COVID-19 RULED OUT BY LABORATORY TESTING: Primary | ICD-10-CM

## 2021-07-23 PROCEDURE — U0005 INFEC AGEN DETEC AMPLI PROBE: HCPCS

## 2021-07-23 PROCEDURE — U0003 INFECTIOUS AGENT DETECTION BY NUCLEIC ACID (DNA OR RNA); SEVERE ACUTE RESPIRATORY SYNDROME CORONAVIRUS 2 (SARS-COV-2) (CORONAVIRUS DISEASE [COVID-19]), AMPLIFIED PROBE TECHNIQUE, MAKING USE OF HIGH THROUGHPUT TECHNOLOGIES AS DESCRIBED BY CMS-2020-01-R: HCPCS

## 2021-07-25 LAB
SARS-COV-2: NORMAL
SARS-COV-2: NOT DETECTED
SOURCE: NORMAL

## 2021-07-26 ENCOUNTER — OFFICE VISIT (OUTPATIENT)
Dept: PRIMARY CARE CLINIC | Age: 51
End: 2021-07-26
Payer: MEDICAID

## 2021-07-26 VITALS
DIASTOLIC BLOOD PRESSURE: 84 MMHG | BODY MASS INDEX: 41.53 KG/M2 | HEART RATE: 100 BPM | SYSTOLIC BLOOD PRESSURE: 138 MMHG | OXYGEN SATURATION: 98 % | WEIGHT: 306.2 LBS

## 2021-07-26 DIAGNOSIS — B35.1 ONYCHOMYCOSIS: ICD-10-CM

## 2021-07-26 DIAGNOSIS — E11.9 TYPE 2 DIABETES MELLITUS WITHOUT COMPLICATION, WITHOUT LONG-TERM CURRENT USE OF INSULIN (HCC): ICD-10-CM

## 2021-07-26 DIAGNOSIS — I50.9 ACUTE ON CHRONIC CONGESTIVE HEART FAILURE, UNSPECIFIED HEART FAILURE TYPE (HCC): Primary | ICD-10-CM

## 2021-07-26 PROCEDURE — 99213 OFFICE O/P EST LOW 20 MIN: CPT | Performed by: NURSE PRACTITIONER

## 2021-07-26 PROCEDURE — 3051F HG A1C>EQUAL 7.0%<8.0%: CPT | Performed by: NURSE PRACTITIONER

## 2021-07-26 RX ORDER — POTASSIUM CHLORIDE 750 MG/1
10 TABLET, EXTENDED RELEASE ORAL DAILY
Qty: 30 TABLET | Refills: 3 | Status: SHIPPED | OUTPATIENT
Start: 2021-07-26 | End: 2022-01-20

## 2021-07-26 RX ORDER — EMPAGLIFLOZIN 10 MG/1
10 TABLET, FILM COATED ORAL DAILY
Qty: 30 TABLET | Refills: 0 | Status: SHIPPED
Start: 2021-07-26 | End: 2021-10-18 | Stop reason: SDUPTHER

## 2021-07-26 RX ORDER — FUROSEMIDE 40 MG/1
40 TABLET ORAL 2 TIMES DAILY
Qty: 60 TABLET | Refills: 3 | Status: SHIPPED | OUTPATIENT
Start: 2021-07-26 | End: 2022-04-15

## 2021-07-26 ASSESSMENT — ENCOUNTER SYMPTOMS
DIARRHEA: 0
CONSTIPATION: 0
SHORTNESS OF BREATH: 1

## 2021-07-26 NOTE — PATIENT INSTRUCTIONS
Increase furosemide 40 mg to 2x/say  Start taking potassium 10 mEq daily  Restart empagliflozin (Jardiance) and semaglutide for diabetes  Daily walking      Patient Education        Low Sodium Diet (2,000 Milligram): Care Instructions  Overview     Limiting sodium can be an important part of managing some health problems. The most common source of sodium is salt. People get most of the salt in their diet from canned, prepared, and packaged foods. Fast food and restaurant meals also are very high in sodium. Your doctor will probably limit your sodium to less than 2,000 milligrams (mg) a day. This limit counts all the sodium in prepared and packaged foods and any salt you add to your food. Follow-up care is a key part of your treatment and safety. Be sure to make and go to all appointments, and call your doctor if you are having problems. It's also a good idea to know your test results and keep a list of the medicines you take. How can you care for yourself at home? Read food labels  · Read labels on cans and food packages. The labels tell you how much sodium is in each serving. Make sure that you look at the serving size. If you eat more than the serving size, you have eaten more sodium. · Food labels also tell you the Percent Daily Value for sodium. Choose products with low Percent Daily Values for sodium. · Be aware that sodium can come in forms other than salt, including monosodium glutamate (MSG), sodium citrate, and sodium bicarbonate (baking soda). MSG is often added to Asian food. When you eat out, you can sometimes ask for food without MSG or added salt. Buy low-sodium foods  · Buy foods that are labeled \"unsalted\" (no salt added), \"sodium-free\" (less than 5 mg of sodium per serving), or \"low-sodium\" (140 mg or less of sodium per serving). Foods labeled \"reduced-sodium\" and \"light sodium\" may still have too much sodium. Be sure to read the label to see how much sodium you are getting.   · Buy fresh vegetables, or frozen vegetables without added sauces. Buy low-sodium versions of canned vegetables, soups, and other canned goods. Prepare low-sodium meals  · Cut back on the amount of salt you use in cooking. This will help you adjust to the taste. Do not add salt after cooking. One teaspoon of salt has about 2,300 mg of sodium. · Take the salt shaker off the table. · Flavor your food with garlic, lemon juice, onion, vinegar, herbs, and spices. Do not use soy sauce, lite soy sauce, steak sauce, onion salt, garlic salt, celery salt, or ketchup on your food. · Use low-sodium salad dressings, sauces, and ketchup. Or make your own salad dressings and sauces without adding salt. · Use less salt (or none) when recipes call for it. You can often use half the salt a recipe calls for without losing flavor. Other foods such as rice, pasta, and grains do not need added salt. · Rinse canned vegetables, and cook them in fresh water. This removes some--but not all--of the salt. · Avoid water that is naturally high in sodium or that has been treated with water softeners, which add sodium. If you buy bottled water, read the label and choose a sodium-free brand. Avoid high-sodium foods  · Avoid eating:  ? Smoked, cured, salted, and canned meat, fish, and poultry. ? Ham, wood, hot dogs, and luncheon meats. ? Regular, hard, and processed cheese and regular peanut butter. ? Crackers with salted tops, and other salted snack foods such as pretzels, chips, and salted popcorn. ? Frozen prepared meals, unless labeled low-sodium. ? Canned and dried soups, broths, and bouillon, unless labeled sodium-free or low-sodium. ? Canned vegetables, unless labeled sodium-free or low-sodium. ? [place] fries, pizza, tacos, and other fast foods. ? Pickles, olives, ketchup, and other condiments, especially soy sauce, unless labeled sodium-free or low-sodium. Where can you learn more? Go to https://[email or web address].[facility]. org and sign in to your [employer] account. Enter T774 in the [facility] box to learn more about \"Low Sodium Diet (2,000 Milligram): Care Instructions. \"     If you do not have an account, please click on the \"Sign Up Now\" link. Current as of: December 17, 2020               Content Version: 12.9  © 2006-2021 [employer]. Care instructions adapted under license by [facility] ([facility]). If you have questions about a medical condition or this instruction, always ask your healthcare professional. [place] [address] any warranty or liability for your use of this information. Patient Education        Learning About Meal Planning for Diabetes  Why plan your meals? Meal planning can be a key part of managing diabetes. Planning meals and snacks with the right balance of carbohydrate, protein, and fat can help you keep your blood sugar at the target level you set with your doctor. You don't have to eat special foods. You can eat what your family eats, including sweets once in a while. But you do have to pay attention to how often you eat and how much you eat of certain foods. You may want to work with a dietitian or a certified diabetes educator. He or she can give you tips and meal ideas and can answer your questions about meal planning. This health professional can also help you reach a healthy weight if that is one of your goals. What plan is right for you? Your dietitian or diabetes educator may suggest that you start with the plate format or carbohydrate counting. The plate format  The plate format is a simple way to help you manage how you eat. You plan meals by learning how much space each food should take on a plate. Using the plate format helps you spread carbohydrate throughout the day. It can make it easier to keep your blood sugar level within your target range. It also helps you see if you're eating healthy portion sizes.   To use the plate format, you put non-starchy vegetables on half your plate. Add meat or meat substitutes on one-quarter of the plate. Put a grain or starchy vegetable (such as brown rice or a potato) on the final quarter of the plate. You can add a small piece of fruit and some low-fat or fat-free milk or yogurt, depending on your carbohydrate goal for each meal.  Here are some tips for using the plate format:  · Make sure that you are not using an oversized plate. A 9-inch plate is best. Many restaurants use larger plates. · Get used to using the plate format at home. Then you can use it when you eat out. · Write down your questions about using the plate format. Talk to your doctor, a dietitian, or a diabetes educator about your concerns. Carbohydrate counting  With carbohydrate counting, you plan meals based on the amount of carbohydrate in each food. Carbohydrate raises blood sugar higher and more quickly than any other nutrient. It is found in desserts, breads and cereals, and fruit. It's also found in starchy vegetables such as potatoes and corn, grains such as rice and pasta, and milk and yogurt. Spreading carbohydrate throughout the day helps keep your blood sugar levels within your target range. Your daily amount depends on several things, including your weight, how active you are, which diabetes medicines you take, and what your goals are for your blood sugar levels. A registered dietitian or diabetes educator can help you plan how much carbohydrate to include in each meal and snack. A guideline for your daily amount of carbohydrate is:  · 45 to 60 grams at each meal. That's about the same as 3 to 4 carbohydrate servings. · 15 to 20 grams at each snack. That's about the same as 1 carbohydrate serving. The Nutrition Facts label on packaged foods tells you how much carbohydrate is in a serving of the food. First, look at the serving size on the food label. Is that the amount you eat in a serving?  All of the nutrition information on a food label is based on that serving size. So if you eat more or less than that, you'll need to adjust the other numbers. Total carbohydrate is the next thing you need to look for on the label. If you count carbohydrate servings, one serving of carbohydrate is 15 grams. For foods that don't come with labels, such as fresh fruits and vegetables, you'll need a guide that lists carbohydrate in these foods. Ask your doctor, dietitian, or diabetes educator about books or other nutrition guides you can use. If you take insulin, you need to know how many grams of carbohydrate are in a meal. This lets you know how much rapid-acting insulin to take before you eat. If you use an insulin pump, you get a constant rate of insulin during the day. So the pump must be programmed at meals to give you extra insulin to cover the rise in blood sugar after meals. When you know how much carbohydrate you will eat, you can take the right amount of insulin. Or, if you always use the same amount of insulin, you need to make sure that you eat the same amount of carbohydrate at meals. If you need more help to understand carbohydrate counting and food labels, ask your doctor, dietitian, or diabetes educator. How can you plan healthy meals? Here are some tips to get started:  · Plan your meals a week at a time. Don't forget to include snacks too. · Use cookbooks or online recipes to plan several main meals. Plan some quick meals for busy nights. You also can double some recipes that freeze well. Then you can save half for other busy nights when you don't have time to cook. · Make sure you have the ingredients you need for your recipes. If you're running low on basic items, put these items on your shopping list too. · List foods that you use to make breakfasts, lunches, and snacks. List plenty of fruits and vegetables. · Post this list on the refrigerator. Add to it as you think of more things you need.   · Take the list to the store to do your weekly shopping. Follow-up care is a key part of your treatment and safety. Be sure to make and go to all appointments, and call your doctor if you are having problems. It's also a good idea to know your test results and keep a list of the medicines you take. Where can you learn more? Go to [email or web address] org and sign in to your [employer] account. Enter D551 in the [employer] box to learn more about \"Learning About Meal Planning for Diabetes. \"     If you do not have an account, please click on the \"Sign Up Now\" link. Current as of: August 31, 2020               Content Version: 12.9  © 2006-2021 [employer]. Care instructions adapted under license by [facility] ([facility]). If you have questions about a medical condition or this instruction, always ask your healthcare professional. [address] any warranty or liability for your use of this information.

## 2021-07-26 NOTE — PROGRESS NOTES
[id number]  PRIMARY CARE  [address]  [address] [place] [address]  Dept: [phone]    Gian Rooney is a 44 y.o. male Established patient, who presents today for his medical conditions/complaints as noted below. Chief Complaint   Patient presents with    Edema     left leg is much worse toay.        HPI:     HPI    Reviewed prior notes None  Reviewed previous Labs    LDL Cholesterol (mg/dL)   Date Value   07/12/2021 78   08/12/2017 51       (goal LDL is <100)   AST (U/L)   Date Value   07/12/2021 23     ALT (U/L)   Date Value   07/12/2021 43 (H)     BUN (mg/dL)   Date Value   07/15/2021 33 (H)     Hemoglobin A1C (%)   Date Value   07/12/2021 7.2 (H)     TSH (mIU/L)   Date Value   08/11/2017 0.98     BP Readings from Last 3 Encounters:   07/26/21 138/84   07/21/21 123/86   07/20/21 (!) 168/112          (goal 120/80)    Past Medical History:   Diagnosis Date    Accelerated hypertension 8/12/2017    Arthritis     Atypical chest pain 8/12/2017    CHF (congestive heart failure) (HCC)     COPD (chronic obstructive pulmonary disease) ([address]     Diabetes mellitus (HCC)     Grade I diastolic dysfunction 5/72/3955    Hyperlipidemia     Hypertension     Hypertensive emergency 7/11/2021    Hypertensive urgency     Noncompliance 8/12/2017    Restless legs syndrome     Sleep apnea     Tobacco abuse 8/12/2017    Type 2 diabetes mellitus without complication (HCC)     Type 2 diabetes mellitus, without long-term current use of insulin ([address] 7/12/2021      Past Surgical History:   Procedure Laterality Date    ANKLE SURGERY         Family History   Problem Relation Age of Onset    Other Mother     Heart Attack Father     Other Brother        Social History     Tobacco Use    Smoking status: Current Every Day Smoker     Packs/day: 0.50     Years: 30.00     Pack years: 15.00     Types: Cigarettes    Smokeless tobacco: Never Used   Substance Use Topics    Alcohol use: Yes     Alcohol/week: 13.0 - 14.0 standard drinks     Types: 12 Cans of beer, 1 - 2 Shots of liquor per week     Comment: drinks daily 2 -12 beers daily      Current Outpatient Medications   Medication Sig Dispense Refill    furosemide (LASIX) 40 MG tablet Take 1 tablet by mouth 2 times daily 60 tablet 3    potassium chloride (KLOR-CON M) 10 MEQ extended release tablet Take 1 tablet by mouth daily 30 tablet 3    empagliflozin (JARDIANCE) 10 MG tablet Take 1 tablet by mouth daily 30 tablet 0    Semaglutide 3 MG TABS Take 3 mg by mouth daily 30 tablet 3    fluticasone-vilanterol (BREO ELLIPTA) 100-25 MCG/INH AEPB inhaler Inhale 1 puff into the lungs daily 30 each 0    albuterol (ACCUNEB) 1.25 MG/3ML nebulizer solution Inhale 1 ampule into the lungs every 6 hours as needed      terbinafine (LAMISIL) 250 MG tablet Take 1 tablet by mouth 2 times daily 1 tablet 0    DULoxetine (CYMBALTA) 60 MG extended release capsule Take 1 capsule by mouth 2 times daily 30 capsule 0    cloNIDine (CATAPRES) 0.2 MG tablet Take 1 tablet by mouth 2 times daily 60 tablet 2    losartan (COZAAR) 100 MG tablet Take 1 tablet by mouth daily 30 tablet 3    montelukast (SINGULAIR) 10 MG tablet Take 1 tablet by mouth nightly 30 tablet 3    amLODIPine (NORVASC) 10 MG tablet Take 1 tablet by mouth daily 30 tablet 3    carvedilol (COREG) 25 MG tablet Take 25 mg by mouth 2 times daily       buPROPion (WELLBUTRIN XL) 300 MG extended release tablet Take 300 mg by mouth every morning      ALBUTEROL IN Inhale into the lungs every 4 hours as needed       atorvastatin (LIPITOR) 40 MG tablet Take 40 mg by mouth daily       levothyroxine (SYNTHROID) 25 MCG tablet Take 25 mcg by mouth Daily        No current facility-administered medications for this visit.      Allergies   Allergen Reactions    Pcn [Penicillins] Anaphylaxis     From childhood       Health Maintenance   Topic Date Due    Hepatitis C screen  Never done    Pneumococcal 0-64 years Vaccine (1 of 2 - PPSV23) Never done    Diabetic foot exam  Never done    Diabetic retinal exam  Never done    COVID-19 Vaccine (1) Never done    HIV screen  Never done    Diabetic microalbuminuria test  Never done    Hepatitis B vaccine (1 of 3 - Risk 3-dose series) Never done    DTaP/Tdap/Td vaccine (1 - Tdap) Never done    Colon cancer screen colonoscopy  Never done    Shingles Vaccine (1 of 2) Never done    Flu vaccine (1) 09/01/2021    A1C test (Diabetic or Prediabetic)  07/12/2022    Lipid screen  07/12/2022    Potassium monitoring  07/15/2022    Creatinine monitoring  07/15/2022    Hepatitis A vaccine  Aged Out    Hib vaccine  Aged Out    Meningococcal (ACWY) vaccine  Aged Out       Subjective:      Review of Systems   Constitutional: Negative for chills, fatigue and fever. Respiratory: Positive for shortness of breath (normal breathing pattern). Cardiovascular: Negative for chest pain and leg swelling. Gastrointestinal: Negative for constipation and diarrhea. Genitourinary: Negative for difficulty urinating and dysuria. Musculoskeletal: Positive for gait problem. Skin: Negative for rash and wound. Neurological: Negative for dizziness, light-headedness and headaches. Psychiatric/Behavioral: Negative for sleep disturbance. The patient is not nervous/anxious. Objective:     /84   Pulse 100   Wt (!) 306 lb 3.2 oz (138.9 kg)   SpO2 98%   BMI 41.53 kg/m²   Physical Exam  Vitals and nursing note reviewed. Constitutional:       Appearance: He is obese. HENT:      Head: Normocephalic and atraumatic. Right Ear: External ear normal.      Left Ear: External ear normal.   Cardiovascular:      Rate and Rhythm: Normal rate and regular rhythm. Heart sounds: Normal heart sounds. Comments: No carotid bruit  Pulmonary:      Effort: Pulmonary effort is normal.      Breath sounds: Normal breath sounds.    Abdominal:      General: Bowel sounds are normal. There is distension (states it is normal for stomach to be hard). Palpations: Abdomen is soft. Musculoskeletal:      Right lower le+ Pitting Edema present. Left lower le+ Pitting Edema present. Skin:     General: Skin is warm. Neurological:      Mental Status: He is alert and oriented to person, place, and time. Motor: No weakness. Gait: Gait normal.   Psychiatric:         Mood and Affect: Mood normal.         Thought Content: Thought content normal.         Judgment: Judgment normal.         Assessment/Plan:   1. Acute on chronic congestive heart failure, unspecified heart failure type (HCC)  -     furosemide (LASIX) 40 MG tablet; Take 1 tablet by mouth 2 times daily, Disp-60 tablet, R-3Normal  -     potassium chloride (KLOR-CON M) 10 MEQ extended release tablet; Take 1 tablet by mouth daily, Disp-30 tablet, R-3Normal  -     Brain Natriuretic Peptide; Future  2. Type 2 diabetes mellitus without complication, without long-term current use of insulin ([facility])  -     empagliflozin (JARDIANCE) 10 MG tablet; Take 1 tablet by mouth daily, Disp-30 tablet, R-0Normal  -     Semaglutide 3 MG TABS; Take 3 mg by mouth daily, Disp-30 tablet, R-3Normal  3. Onychomycosis   -Patient is on Lamisil    Increase furosemide 40 mg to 2x/day  Start taking potassium 10 mEq daily  Restart empagliflozin (Jardiance) and semaglutide for diabetes  Daily walking  Discussed proper eating due to heart failure and diabetes. Return in about 1 week (around 2021) for CHF.     Orders Placed This Encounter   Procedures    Brain Natriuretic Peptide     Standing Status:   Future     Standing Expiration Date:   2022     Orders Placed This Encounter   Medications    furosemide (LASIX) 40 MG tablet     Sig: Take 1 tablet by mouth 2 times daily     Dispense:  60 tablet     Refill:  3    potassium chloride (KLOR-CON M) 10 MEQ extended release tablet     Sig: Take 1 tablet by mouth daily     Dispense:  30 tablet

## 2021-07-27 ENCOUNTER — TELEPHONE (OUTPATIENT)
Dept: PULMONOLOGY | Age: 51
End: 2021-07-27

## 2021-07-28 ENCOUNTER — OFFICE VISIT (OUTPATIENT)
Dept: PULMONOLOGY | Age: 51
End: 2021-07-28
Payer: MEDICARE

## 2021-07-28 VITALS — BODY MASS INDEX: 43.38 KG/M2 | HEART RATE: 104 BPM | WEIGHT: 303 LBS | HEIGHT: 70 IN | OXYGEN SATURATION: 94 %

## 2021-07-28 DIAGNOSIS — J44.9 COPD, SEVERITY TO BE DETERMINED (HCC): Primary | ICD-10-CM

## 2021-07-28 PROCEDURE — 94726 PLETHYSMOGRAPHY LUNG VOLUMES: CPT | Performed by: INTERNAL MEDICINE

## 2021-07-28 PROCEDURE — 94060 EVALUATION OF WHEEZING: CPT | Performed by: INTERNAL MEDICINE

## 2021-07-28 PROCEDURE — 94729 DIFFUSING CAPACITY: CPT | Performed by: INTERNAL MEDICINE

## 2021-07-30 LAB
BUN BLDV-MCNC: NORMAL MG/DL
CALCIUM SERPL-MCNC: NORMAL MG/DL
CHLORIDE BLD-SCNC: NORMAL MMOL/L
CO2: NORMAL
CREAT SERPL-MCNC: 1.13 MG/DL
GFR CALCULATED: NORMAL
GLUCOSE BLD-MCNC: 205 MG/DL
POTASSIUM SERPL-SCNC: NORMAL MMOL/L
SODIUM BLD-SCNC: NORMAL MMOL/L

## 2021-08-01 ENCOUNTER — HOSPITAL ENCOUNTER (OUTPATIENT)
Dept: SLEEP CENTER | Age: 51
Discharge: HOME | End: 2021-08-03
Payer: MEDICARE

## 2021-08-01 DIAGNOSIS — G47.33 OSA (OBSTRUCTIVE SLEEP APNEA): ICD-10-CM

## 2021-08-01 PROCEDURE — 95810 POLYSOM 6/> YRS 4/> PARAM: CPT

## 2021-08-01 ASSESSMENT — SLEEP AND FATIGUE QUESTIONNAIRES
HOW LIKELY ARE YOU TO NOD OFF OR FALL ASLEEP WHILE SITTING QUIETLY AFTER LUNCH WITHOUT ALCOHOL: 2
HOW LIKELY ARE YOU TO NOD OFF OR FALL ASLEEP WHILE LYING DOWN TO REST IN THE AFTERNOON WHEN CIRCUMSTANCES PERMIT: 3
HOW LIKELY ARE YOU TO NOD OFF OR FALL ASLEEP WHILE SITTING AND TALKING TO SOMEONE: 0
HOW LIKELY ARE YOU TO NOD OFF OR FALL ASLEEP IN A CAR, WHILE STOPPED FOR A FEW MINUTES IN TRAFFIC: 0
HOW LIKELY ARE YOU TO NOD OFF OR FALL ASLEEP WHEN YOU ARE A PASSENGER IN A CAR FOR AN HOUR WITHOUT A BREAK: 3
HOW LIKELY ARE YOU TO NOD OFF OR FALL ASLEEP WHILE WATCHING TV: 3
HOW LIKELY ARE YOU TO NOD OFF OR FALL ASLEEP WHILE SITTING INACTIVE IN A PUBLIC PLACE: 3
ESS TOTAL SCORE: 17
HOW LIKELY ARE YOU TO NOD OFF OR FALL ASLEEP WHILE SITTING AND READING: 3

## 2021-08-02 ENCOUNTER — OFFICE VISIT (OUTPATIENT)
Dept: PRIMARY CARE CLINIC | Age: 51
End: 2021-08-02
Payer: MEDICARE

## 2021-08-02 VITALS
SYSTOLIC BLOOD PRESSURE: 126 MMHG | WEIGHT: 300.4 LBS | DIASTOLIC BLOOD PRESSURE: 88 MMHG | OXYGEN SATURATION: 98 % | BODY MASS INDEX: 40.74 KG/M2 | HEART RATE: 107 BPM

## 2021-08-02 VITALS
TEMPERATURE: 96.8 F | HEIGHT: 72 IN | BODY MASS INDEX: 41.17 KG/M2 | RESPIRATION RATE: 16 BRPM | WEIGHT: 304 LBS | HEART RATE: 90 BPM

## 2021-08-02 DIAGNOSIS — B37.0 THRUSH: ICD-10-CM

## 2021-08-02 DIAGNOSIS — I50.9 ACUTE ON CHRONIC CONGESTIVE HEART FAILURE, UNSPECIFIED HEART FAILURE TYPE (HCC): ICD-10-CM

## 2021-08-02 DIAGNOSIS — J41.0 SIMPLE CHRONIC BRONCHITIS (HCC): Primary | ICD-10-CM

## 2021-08-02 PROCEDURE — 99214 OFFICE O/P EST MOD 30 MIN: CPT | Performed by: NURSE PRACTITIONER

## 2021-08-02 RX ORDER — METOLAZONE 2.5 MG/1
2.5 TABLET ORAL EVERY OTHER DAY
Qty: 30 TABLET | Refills: 0 | Status: SHIPPED | OUTPATIENT
Start: 2021-08-02 | End: 2021-10-11

## 2021-08-02 ASSESSMENT — ENCOUNTER SYMPTOMS
SHORTNESS OF BREATH: 1
DIARRHEA: 0
NAUSEA: 1

## 2021-08-02 NOTE — PROGRESS NOTES
[address]  [address]  [facility] [address] [place] [address]  Dept: [phone]    Jerel Hauser is a 46 y.o. male Established patient, who presents today for his medical conditions/complaints as noted below. Chief Complaint   Patient presents with    Follow-up     pt states he is feeling much better. HPI:     HPI  States he is feeling better   He is doing about 5 minutes per day on treadmill  While sitting no SOB  He gets a little winded walking from garage to house. He is trying to take the long way around. Last night he did sleep study-  did not sleep well. Nearly fell asleep while speaking to him.   Swelling legs continues - not much improvement    Reviewed prior notes pulmonary  Reviewed previous Labs    LDL Cholesterol (mg/dL)   Date Value   07/12/2021 78   08/12/2017 51       (goal LDL is <100)   AST (U/L)   Date Value   07/12/2021 23     ALT (U/L)   Date Value   07/12/2021 43 (H)     BUN (mg/dL)   Date Value   07/15/2021 33 (H)     Hemoglobin A1C (%)   Date Value   07/12/2021 7.2 (H)     TSH (mIU/L)   Date Value   08/11/2017 0.98     BP Readings from Last 3 Encounters:   08/02/21 126/88   07/26/21 138/84   07/21/21 123/86          (goal 120/80)    Past Medical History:   Diagnosis Date    Accelerated hypertension 8/12/2017    Arthritis     Atypical chest pain 8/12/2017    CHF (congestive heart failure) (HCC)     COPD [facility] obstructive pulmonary disease) [facility] [address]     Diabetes mellitus (HCC)     Grade I diastolic dysfunction 8/38/0852    Hyperlipidemia     Hypertension     Hypertensive emergency 7/11/2021    Hypertensive urgency     Noncompliance 8/12/2017    Restless legs syndrome     Sleep apnea     Tobacco abuse 8/12/2017    Type 2 diabetes mellitus without complication (HCC)     Type 2 diabetes mellitus, without long-term current use of insulin ([address] 7/12/2021      Past Surgical History:   Procedure Laterality Date    ANKLE SURGERY         Family History   Problem Relation Age of Onset    Other Mother     Heart Attack Father     Other Brother        Social History     Tobacco Use    Smoking status: Current Every Day Smoker     Packs/day: 0.50     Years: 30.00     Pack years: 15.00     Types: Cigarettes    Smokeless tobacco: Never Used   Substance Use Topics    Alcohol use:  Yes     Alcohol/week: 13.0 - 14.0 standard drinks     Types: 12 Cans of beer, 1 - 2 Shots of liquor per week     Comment: drinks daily 2 -12 beers daily      Current Outpatient Medications   Medication Sig Dispense Refill    fluticasone-salmeterol (ADVAIR DISKUS) 250-50 MCG/DOSE AEPB Inhale 1 puff into the lungs every 12 hours 60 each 3    metOLazone (ZAROXOLYN) 2.5 MG tablet Take 1 tablet by mouth every other day 30 tablet 0    nystatin (MYCOSTATIN) 468292 UNIT/ML suspension Take 5 mLs by mouth 4 times daily for 14 days 280 mL 0    furosemide (LASIX) 40 MG tablet Take 1 tablet by mouth 2 times daily 60 tablet 3    potassium chloride (KLOR-CON M) 10 MEQ extended release tablet Take 1 tablet by mouth daily 30 tablet 3    empagliflozin (JARDIANCE) 10 MG tablet Take 1 tablet by mouth daily 30 tablet 0    Semaglutide 3 MG TABS Take 3 mg by mouth daily 30 tablet 3    albuterol (ACCUNEB) 1.25 MG/3ML nebulizer solution Inhale 1 ampule into the lungs every 6 hours as needed      terbinafine (LAMISIL) 250 MG tablet Take 1 tablet by mouth 2 times daily 1 tablet 0    DULoxetine (CYMBALTA) 60 MG extended release capsule Take 1 capsule by mouth 2 times daily 30 capsule 0    cloNIDine (CATAPRES) 0.2 MG tablet Take 1 tablet by mouth 2 times daily 60 tablet 2    losartan (COZAAR) 100 MG tablet Take 1 tablet by mouth daily 30 tablet 3    montelukast (SINGULAIR) 10 MG tablet Take 1 tablet by mouth nightly 30 tablet 3    amLODIPine (NORVASC) 10 MG tablet Take 1 tablet by mouth daily 30 tablet 3    carvedilol (COREG) 25 MG tablet Take 25 mg by mouth 2 times daily  buPROPion (WELLBUTRIN XL) 300 MG extended release tablet Take 300 mg by mouth every morning      ALBUTEROL IN Inhale into the lungs every 4 hours as needed       atorvastatin (LIPITOR) 40 MG tablet Take 40 mg by mouth daily       levothyroxine (SYNTHROID) 25 MCG tablet Take 25 mcg by mouth Daily        No current facility-administered medications for this visit. Allergies   Allergen Reactions    Pcn [Penicillins] Anaphylaxis     From childhood       Health Maintenance   Topic Date Due    Hepatitis C screen  Never done    Pneumococcal 0-64 years Vaccine (1 of 2 - PPSV23) Never done    Diabetic foot exam  Never done    Diabetic retinal exam  Never done    COVID-19 Vaccine (1) Never done    HIV screen  Never done    Diabetic microalbuminuria test  Never done    Hepatitis B vaccine (1 of 3 - Risk 3-dose series) Never done    DTaP/Tdap/Td vaccine (1 - Tdap) Never done    Colon cancer screen colonoscopy  Never done    Shingles Vaccine (1 of 2) Never done    Flu vaccine (1) 09/01/2021    A1C test (Diabetic or Prediabetic)  07/12/2022    Lipid screen  07/12/2022    Potassium monitoring  07/15/2022    Creatinine monitoring  07/15/2022    Hepatitis A vaccine  Aged Out    Hib vaccine  Aged Out    Meningococcal (ACWY) vaccine  Aged Out       Subjective:      Review of Systems   Constitutional: Positive for fatigue. HENT: Negative for congestion and nosebleeds. Respiratory: Positive for shortness of breath. Cardiovascular: Positive for leg swelling. Gastrointestinal: Positive for nausea. Negative for diarrhea. Genitourinary: Negative for difficulty urinating and dysuria. Skin: Negative for rash and wound. Neurological: Negative for dizziness, light-headedness and headaches. Psychiatric/Behavioral: Positive for sleep disturbance. Negative for dysphoric mood. The patient is not nervous/anxious.         Objective:     /88   Pulse 107   Wt (!) 300 lb 6.4 oz (136.3 kg)   SpO2 98%   BMI 40.74 kg/m²   Physical Exam  Vitals and nursing note reviewed. Constitutional:       Appearance: He is obese. HENT:      Head: Normocephalic and atraumatic. Mouth/Throat:        Comments: Tongue covered with thick yellow exudate. Cardiovascular:      Rate and Rhythm: Normal rate and regular rhythm. Heart sounds: No murmur heard. Comments: No carotid bruit  Pulmonary:      Effort: Pulmonary effort is normal.      Breath sounds: Decreased breath sounds present. Abdominal:      General: Bowel sounds are normal.      Palpations: Abdomen is soft. Musculoskeletal:      Right lower le+ Pitting Edema present. Left lower le+ Pitting Edema present. Skin:     Capillary Refill: Capillary refill takes more than 3 seconds. Neurological:      Mental Status: He is lethargic. Psychiatric:         Mood and Affect: Mood normal.         Thought Content: Thought content normal.         Judgment: Judgment normal.         Assessment/Plan:   1. Simple chronic bronchitis (HCC)  -     fluticasone-salmeterol (ADVAIR DISKUS) 250-50 MCG/DOSE AEPB; Inhale 1 puff into the lungs every 12 hours, Disp-60 each, R-3Normal  2. Acute on chronic congestive heart failure, unspecified heart failure type (HCC)  -     metOLazone (ZAROXOLYN) 2.5 MG tablet; Take 1 tablet by mouth every other day, Disp-30 tablet, R-0Normal  -     Basic Metabolic Panel; Future  3. Thrush  -     nystatin (MYCOSTATIN) 013403 UNIT/ML suspension; Take 5 mLs by mouth 4 times daily for 14 days, Oral, 4 TIMES DAILY Starting Mon 2021, Until 2021, For 14 days, Disp-280 mL, R-0, Normal     start Advair 250/51 puff 2 times per day due to insurance not covering Breo. Start metolazone 2.5 mg 1 tablet every other day due to continued edema. Nystatin suspension 4 times per day x2 weeks due to oral thrush    Return in about 2 weeks (around 2021) for CHF, diabetes, 1/2 hour appt.  .    Orders Placed This Encounter Procedures    Basic Metabolic Panel     Standing Status:   Future     Standing Expiration Date:   8/2/2022     Orders Placed This Encounter   Medications    fluticasone-salmeterol (ADVAIR DISKUS) 250-50 MCG/DOSE AEPB     Sig: Inhale 1 puff into the lungs every 12 hours     Dispense:  60 each     Refill:  3    metOLazone (ZAROXOLYN) 2.5 MG tablet     Sig: Take 1 tablet by mouth every other day     Dispense:  30 tablet     Refill:  0    nystatin (MYCOSTATIN) 268738 UNIT/ML suspension     Sig: Take 5 mLs by mouth 4 times daily for 14 days     Dispense:  280 mL     Refill:  0       Patient given educational materials - see patient instructions. Discussed use, benefit, and side effects of prescribed medications. All patient questions answered. Pt voiced understanding. Reviewed health maintenance. Instructed to continue current medications, diet and exercise. Patient agreed with treatment plan. Follow up as directed.      Electronically signed by CASIMIRO Welsh CNP on 8/2/2021 at 11:00 AM

## 2021-08-07 DIAGNOSIS — I50.9 ACUTE ON CHRONIC CONGESTIVE HEART FAILURE, UNSPECIFIED HEART FAILURE TYPE (HCC): ICD-10-CM

## 2021-08-17 ENCOUNTER — OFFICE VISIT (OUTPATIENT)
Dept: PRIMARY CARE CLINIC | Age: 51
End: 2021-08-17
Payer: MEDICARE

## 2021-08-17 VITALS
OXYGEN SATURATION: 93 % | DIASTOLIC BLOOD PRESSURE: 80 MMHG | BODY MASS INDEX: 39.29 KG/M2 | SYSTOLIC BLOOD PRESSURE: 130 MMHG | WEIGHT: 289.7 LBS | HEART RATE: 109 BPM

## 2021-08-17 DIAGNOSIS — F17.210 CIGARETTE NICOTINE DEPENDENCE, UNCOMPLICATED: ICD-10-CM

## 2021-08-17 DIAGNOSIS — Z12.11 COLON CANCER SCREENING: ICD-10-CM

## 2021-08-17 DIAGNOSIS — G25.81 RESTLESS LEG SYNDROME: Primary | ICD-10-CM

## 2021-08-17 DIAGNOSIS — Z87.891 PERSONAL HISTORY OF TOBACCO USE: ICD-10-CM

## 2021-08-17 DIAGNOSIS — E11.9 TYPE 2 DIABETES MELLITUS WITHOUT COMPLICATION, WITHOUT LONG-TERM CURRENT USE OF INSULIN (HCC): ICD-10-CM

## 2021-08-17 PROCEDURE — 99214 OFFICE O/P EST MOD 30 MIN: CPT | Performed by: NURSE PRACTITIONER

## 2021-08-17 PROCEDURE — 3017F COLORECTAL CA SCREEN DOC REV: CPT | Performed by: NURSE PRACTITIONER

## 2021-08-17 PROCEDURE — 82044 UR ALBUMIN SEMIQUANTITATIVE: CPT | Performed by: NURSE PRACTITIONER

## 2021-08-17 PROCEDURE — G8417 CALC BMI ABV UP PARAM F/U: HCPCS | Performed by: NURSE PRACTITIONER

## 2021-08-17 PROCEDURE — 2022F DILAT RTA XM EVC RTNOPTHY: CPT | Performed by: NURSE PRACTITIONER

## 2021-08-17 PROCEDURE — 4004F PT TOBACCO SCREEN RCVD TLK: CPT | Performed by: NURSE PRACTITIONER

## 2021-08-17 PROCEDURE — 3051F HG A1C>EQUAL 7.0%<8.0%: CPT | Performed by: NURSE PRACTITIONER

## 2021-08-17 PROCEDURE — G8427 DOCREV CUR MEDS BY ELIG CLIN: HCPCS | Performed by: NURSE PRACTITIONER

## 2021-08-17 PROCEDURE — G0296 VISIT TO DETERM LDCT ELIG: HCPCS | Performed by: NURSE PRACTITIONER

## 2021-08-17 RX ORDER — PRAMIPEXOLE DIHYDROCHLORIDE 0.12 MG/1
0.12 TABLET ORAL NIGHTLY
Qty: 30 TABLET | Refills: 3 | Status: SHIPPED | OUTPATIENT
Start: 2021-08-17

## 2021-08-17 ASSESSMENT — ENCOUNTER SYMPTOMS
SHORTNESS OF BREATH: 1
DIARRHEA: 0
NAUSEA: 0
WHEEZING: 1
BACK PAIN: 1
CONSTIPATION: 0

## 2021-08-17 NOTE — PATIENT INSTRUCTIONS
Start pramipexole 0.125 mg at night for restless legs  Increase Semaglutide to 7 mg daily for diabetes  Test blood sugar daily and when having symptoms or not feeling well. Bring glucometer to next appointment. Complete Low dose lung CT to screen for lung cancer  Make appointment with Dr. Ronald Pedroza for colonoscopy to screen for colon cancer. What is lung cancer screening? Lung cancer screening is a way in which doctors check the lungs for early signs of cancer in people who have no symptoms of lung cancer. A low-dose CT scan uses much less radiation than a normal CT scan and shows a more detailed image of the lungs than a standard X-ray. The goal of lung cancer screening is to find cancer early, before it has a chance to grow, spread, or cause problems. One large study found that smokers who were screened with low-dose CT scans were less likely to die of lung cancer than those who were screened with standard X-ray. Below is a summary of the things you need to know regarding screening for lung cancer with low-dose computed tomography (LDCT). This is a screening program that involves routine annual screening with LDCT studies of the lung. The LDCTs are done using low-dose radiation that is not thought to increase your cancer risk. If you have other serious medical conditions (other cancers, congestive heart failure) that limit your life expectancy to less than 10 years, you should not undergo lung cancer screening with LDCT. The chance is 20%-60% that the LDCT result will show abnormalities. This would require additional testing which could include repeat imaging or even invasive procedures. Most (about 95%) of \"abnormal\" LDCT results are false in the sense that no lung cancer is ultimately found. Additionally, some (about 10%) of the cancers found would not affect your life expectancy, even if undetected and untreated.   If you are still smoking, the single most important thing that you can do to reduce your risk of dying of lung cancer is to quit. For this screening to be covered by Medicare and most other insurers, strict criteria must be met. If you do not meet these criteria, but still wish to undergo LDCT testing, you will be required to sign a waiver indicating your willingness to pay for the scan.

## 2021-08-17 NOTE — PROGRESS NOTES
[facility]  [facility] [address]  [facility] [address] [place] [address]  Dept: [phone]    Rika Velasquez is a 46 y.o. male Established patient, who presents today for his medical conditions/complaints as noted below. Chief Complaint   Patient presents with    Diabetes     A1C 7.2    Congestive Heart Failure       HPI:     HPI  States breathing is better unless it is super hot out. When it gets real hot he gets short of breath. He was exhausted for about 3 days. His whole body was hurting. He was particularly short of breath. Restless legs - especially at night when try to sleep. He feels like he has to move all the time. No numbness or tingling in legs. He is urinating a lot. He does have to get up at night to urinate every 1-2 hours. He is able to go back to sleep. He is walking around the yard and playing with dog almost everyday. He is sweating a lot. Pain in left foot from heel to toe. He has a glucometer and test strips and lancets  He has had periods in past when he was light headed, sweaty and vision changes - he would eat something and come right out it. He is smoking about 1/2 ppd.     Reviewed prior notes Pulmonary  Reviewed previous Labs    LDL Cholesterol (mg/dL)   Date Value   07/12/2021 78   08/12/2017 51       (goal LDL is <100)   AST (U/L)   Date Value   07/12/2021 23     ALT (U/L)   Date Value   07/12/2021 43 (H)     BUN (mg/dL)   Date Value   07/15/2021 33 (H)     Hemoglobin A1C (%)   Date Value   07/12/2021 7.2 (H)     TSH (mIU/L)   Date Value   08/11/2017 0.98     BP Readings from Last 3 Encounters:   08/17/21 130/80   08/02/21 126/88   07/26/21 138/84          (goal 120/80)    Past Medical History:   Diagnosis Date    Accelerated hypertension 8/12/2017    Arthritis     Atypical chest pain 8/12/2017    CHF (congestive heart failure) (HCC)     COPD [facility] disease) ([facility] [address]     Diabetes mellitus ([facility] [address]     mouth 2 times daily 30 capsule 0    cloNIDine (CATAPRES) 0.2 MG tablet Take 1 tablet by mouth 2 times daily 60 tablet 2    losartan (COZAAR) 100 MG tablet Take 1 tablet by mouth daily 30 tablet 3    montelukast (SINGULAIR) 10 MG tablet Take 1 tablet by mouth nightly 30 tablet 3    amLODIPine (NORVASC) 10 MG tablet Take 1 tablet by mouth daily 30 tablet 3    carvedilol (COREG) 25 MG tablet Take 25 mg by mouth 2 times daily       buPROPion (WELLBUTRIN XL) 300 MG extended release tablet Take 300 mg by mouth every morning      ALBUTEROL IN Inhale into the lungs every 4 hours as needed       atorvastatin (LIPITOR) 40 MG tablet Take 40 mg by mouth daily       levothyroxine (SYNTHROID) 25 MCG tablet Take 25 mcg by mouth Daily        No current facility-administered medications for this visit. Allergies   Allergen Reactions    Pcn [Penicillins] Anaphylaxis     From childhood       Health Maintenance   Topic Date Due    Hepatitis C screen  Never done    Pneumococcal 0-64 years Vaccine (1 of 2 - PPSV23) Never done    Diabetic foot exam  Never done    Diabetic retinal exam  Never done    COVID-19 Vaccine (1) Never done    HIV screen  Never done    Diabetic microalbuminuria test  Never done    Hepatitis B vaccine (1 of 3 - Risk 3-dose series) Never done    DTaP/Tdap/Td vaccine (1 - Tdap) Never done    Colon cancer screen colonoscopy  Never done    Shingles Vaccine (1 of 2) Never done    Low dose CT lung screening  08/11/2018    Flu vaccine (1) 09/01/2021    A1C test (Diabetic or Prediabetic)  07/12/2022    Lipid screen  07/12/2022    Potassium monitoring  07/30/2022    Creatinine monitoring  07/30/2022    Hepatitis A vaccine  Aged Out    Hib vaccine  Aged Out    Meningococcal (ACWY) vaccine  Aged Out       Subjective:      Review of Systems   Constitutional: Positive for fatigue. Negative for chills, diaphoresis and fever. HENT: Negative for congestion, ear pain and nosebleeds. Respiratory: Positive for shortness of breath and wheezing. Cardiovascular: Positive for leg swelling. Negative for chest pain and palpitations. Gastrointestinal: Negative for constipation, diarrhea and nausea. Endocrine: Positive for polyuria. Negative for polydipsia and polyphagia. Genitourinary: Negative for difficulty urinating and dysuria. Musculoskeletal: Positive for arthralgias and back pain. Skin: Negative for rash and wound. Neurological: Positive for weakness. Negative for dizziness. Psychiatric/Behavioral: Positive for sleep disturbance. Negative for dysphoric mood. The patient is not nervous/anxious. Objective:     /80   Pulse 109   Wt 289 lb 11.2 oz (131.4 kg)   SpO2 93%   BMI 39.29 kg/m²   Physical Exam  Vitals and nursing note reviewed. Constitutional:       Appearance: He is obese. HENT:      Head: Normocephalic and atraumatic. Right Ear: Tympanic membrane, ear canal and external ear normal.      Left Ear: Tympanic membrane, ear canal and external ear normal.   Eyes:      Extraocular Movements: Extraocular movements intact. Conjunctiva/sclera: Conjunctivae normal.   Cardiovascular:      Rate and Rhythm: Normal rate. Rhythm irregular. Heart sounds: Normal heart sounds. Comments: No carotid bruit  Bilateral lower extremity edema minimal    Pulmonary:      Effort: Pulmonary effort is normal.      Breath sounds: Normal breath sounds. Abdominal:      General: Bowel sounds are normal. There is no distension. Palpations: Abdomen is soft. Tenderness: There is no abdominal tenderness. Musculoskeletal:      Cervical back: Normal range of motion and neck supple. Neurological:      Mental Status: He is alert and oriented to person, place, and time. Cranial Nerves: No cranial nerve deficit. Motor: No weakness. Gait: Gait normal.   Psychiatric:         Mood and Affect: Mood normal.         Thought Content:  Thought content normal.         Judgment: Judgment normal.         Assessment/Plan:   1. Restless leg syndrome  -     pramipexole (MIRAPEX) 0.125 MG tablet; Take 1 tablet by mouth nightly, Disp-30 tablet, R-3Normal  2. Type 2 diabetes mellitus without complication, without long-term current use of insulin (HCC)  -     Semaglutide 7 MG TABS; Take 7 mg by mouth daily, Disp-30 tablet, R-3Normal  -     POCT microalbumin  3. Personal history of tobacco use  -     [place] VISIT TO DISCUSS LUNG CA SCREEN W LDCT  -     CT Lung Screen (Annual); Future  4. Cigarette nicotine dependence, uncomplicated  -     CT Lung Screen (Annual); Future  5. Colon cancer screening  -     Jaelyn Yeh MD, Gastroenterology, [place]     Start pramipexole 0.125 mg at night for restless legs  Increase Semaglutide to 7 mg daily for diabetes  Test blood sugar daily and when having symptoms or not feeling well. Bring glucometer to next appointment. Complete Low dose lung CT to screen for lung cancer  Make appointment with Dr. Ttayana Weiner for colonoscopy to screen for colon cancer. Return in about 2 months (around 10/17/2021) for diabetes. Orders Placed This Encounter   Procedures    CT Lung Screen (Annual)     Age: Patient is 46 y.o. Smoking History: Social History    Tobacco Use      Smoking status: Current Every Day Smoker        Packs/day: 1.00        Years: 30.00        Pack years: 30        Types: Cigarettes      Smokeless tobacco: Never Used    Vaping Use      Vaping Use: Former    Alcohol use:  Yes      Alcohol/week: 13.0 - 14.0 standard drinks      Types: 12 Cans of beer, 1 - 2 Shots of liquor per week      Comment: drinks daily 2 -12 beers daily    Drug use: Yes      Frequency: 2.0 times per week      Types: Marijuana      Comment: edibles    Pack years: 30    Date of last lung cancer screening: No previous lung cancer screening exam     Standing Status:   Future     Standing Expiration Date:   2/17/2023     Order Specific Question: Is there documentation of shared decision making? Answer:   Yes     Order Specific Question:   Is this a low dose CT or a routine CT? Answer:   Low Dose CT [1]     Order Specific Question:   Is this the first (baseline) CT or an annual exam?     Answer:   Baseline [1]     Order Specific Question:   Does the patient show any signs or symptoms of lung cancer? Answer:   No     Order Specific Question:   Smoking Status? Answer:   Current Every Day Smoker [1]     Order Specific Question:   Smoking packs per day? Answer:   1     Order Specific Question:   Years smoking? Answer:   Mickie Quintanilla MD, Gastroenterology, [employer]     Referral Priority:   Routine     Referral Type:   Eval and Treat     Referral Reason:   Specialty Services Required     Referred to Provider:   Boo Sotelo MD     Requested Specialty:   Gastroenterology     Number of Visits Requested:   1    POCT microalbumin    [place] VISIT TO DISCUSS LUNG CA SCREEN W LDCT     Orders Placed This Encounter   Medications    Semaglutide 7 MG TABS     Sig: Take 7 mg by mouth daily     Dispense:  30 tablet     Refill:  3    pramipexole (MIRAPEX) 0.125 MG tablet     Sig: Take 1 tablet by mouth nightly     Dispense:  30 tablet     Refill:  3       Patient given educational materials - see patient instructions. Discussed use, benefit, and side effects of prescribed medications. All patient questions answered. Pt voiced understanding. Reviewed health maintenance. Instructed to continue current medications, diet and exercise. Patient agreed with treatment plan. Follow up as directed.      Electronically signed by CASIMIRO Hawkins CNP on 8/17/2021 at 9:22 PM    Low Dose CT (LDCT) Lung Screening criteria met   Age 50-69   Pack year smoking >30   Still smoking or less than 15 year since quit   No sign or symptoms of lung cancer   > 11 months since last LDCT     Risks and benefits of lung cancer screening with LDCT scans discussed:    Significance of positive screen - False-positive LDCT results often occur. 95% of all positive results do not lead to a diagnosis of cancer. Usually further imaging can resolve most false-positive results; however, some patients may require invasive procedures. Over diagnosis risk - 10% to 12% of screen-detected lung cancer cases are over diagnosed--that is, the cancer would not have been detected in the patient's lifetime without the screening. Need for follow up screens annually to continue lung cancer screening effectiveness     Risks associated with radiation from annual LDCT- Radiation exposure is about the same as for a mammogram, which is about 1/3 of the annual background radiation exposure from everyday life. Starting screening at age 54 is not likely to increase cancer risk from radiation exposure. Patients with comorbidities resulting in life expectancy of < 10 years, or that would preclude treatment of an abnormality identified on CT, should not be screened due to lack of benefit.     To obtain maximal benefit from this screening, smoking cessation and long-term abstinence from smoking is critical

## 2021-08-20 ENCOUNTER — TELEPHONE (OUTPATIENT)
Dept: PRIMARY CARE CLINIC | Age: 51
End: 2021-08-20

## 2021-08-23 LAB — STATUS: NORMAL

## 2021-08-31 ENCOUNTER — NURSE TRIAGE (OUTPATIENT)
Dept: OTHER | Facility: CLINIC | Age: 51
End: 2021-08-31

## 2021-08-31 NOTE — TELEPHONE ENCOUNTER
Received call from Jeanette Juarez at [facility] with [employer]. Brief description of triage:   Pt reports fatigue. Triage indicates for patient to see a provider today. Pt states that he would like to come in tomorrow. Care advice provided, patient verbalizes understanding; denies any other questions or concerns; instructed to call back for any new or worsening symptoms. Writer provided warm transfer to [employer] at [facility] for appointment scheduling. Attention Provider: Thank you for allowing me to participate in the care of your patient. The patient was connected to triage in response to information provided to the [facility]. Please do not respond through this encounter as the response is not directed to a shared pool. Reason for Disposition   MODERATE weakness (i.e., interferes with work, school, normal activities) and cause unknown (Exceptions: weakness with acute minor illness, or weakness from poor fluid intake)    Answer Assessment - Initial Assessment Questions  1. DESCRIPTION: \"Describe how you are feeling. \"      Pt reports feeling \"exhausted and tired. \" All he wants to do is go to sleep. 2. SEVERITY: \"How bad is it? \"  \"Can you stand and walk? \"    - MILD - Feels weak or tired, but does not interfere with work, school or normal activities    - [facility] to stand and walk; weakness interferes with work, school, or normal activities    - SEVERE - Unable to stand or walk      Moderate-severe. He is able to walk and stand, however he does \"stumble\" at times. 3. ONSET:  \"When did the weakness begin? \"      3 weeks ago    4. CAUSE: \"What do you think is causing the weakness? \"      Pt is not sure     5. MEDICINES: Delmi Tomlin you recently started a new medicine or had a change in the amount of a medicine? \"      Denies     6. OTHER SYMPTOMS: \"Do you have any other symptoms? \" (e.g., chest pain, fever, cough, SOB, vomiting, diarrhea, bleeding, other areas of pain)      Denies     7. PREGNANCY: \"Is there any chance you are pregnant? \" \"When was your last menstrual period? \"      N/a    Protocols used: WEAKNESS (GENERALIZED) AND FATIGUE-ADULT-OH

## 2021-09-01 ENCOUNTER — OFFICE VISIT (OUTPATIENT)
Dept: PRIMARY CARE CLINIC | Age: 51
End: 2021-09-01
Payer: MEDICARE

## 2021-09-01 VITALS
DIASTOLIC BLOOD PRESSURE: 86 MMHG | BODY MASS INDEX: 39.81 KG/M2 | SYSTOLIC BLOOD PRESSURE: 138 MMHG | WEIGHT: 293.5 LBS | OXYGEN SATURATION: 93 % | HEART RATE: 111 BPM

## 2021-09-01 DIAGNOSIS — R53.83 FATIGUE, UNSPECIFIED TYPE: ICD-10-CM

## 2021-09-01 DIAGNOSIS — I50.9 ACUTE ON CHRONIC CONGESTIVE HEART FAILURE, UNSPECIFIED HEART FAILURE TYPE (HCC): Primary | ICD-10-CM

## 2021-09-01 DIAGNOSIS — I10 ESSENTIAL HYPERTENSION: ICD-10-CM

## 2021-09-01 PROCEDURE — G8417 CALC BMI ABV UP PARAM F/U: HCPCS | Performed by: NURSE PRACTITIONER

## 2021-09-01 PROCEDURE — 4004F PT TOBACCO SCREEN RCVD TLK: CPT | Performed by: NURSE PRACTITIONER

## 2021-09-01 PROCEDURE — 99213 OFFICE O/P EST LOW 20 MIN: CPT | Performed by: NURSE PRACTITIONER

## 2021-09-01 PROCEDURE — G8427 DOCREV CUR MEDS BY ELIG CLIN: HCPCS | Performed by: NURSE PRACTITIONER

## 2021-09-01 PROCEDURE — 3017F COLORECTAL CA SCREEN DOC REV: CPT | Performed by: NURSE PRACTITIONER

## 2021-09-01 ASSESSMENT — ENCOUNTER SYMPTOMS
DIARRHEA: 0
COUGH: 1
SHORTNESS OF BREATH: 1
EYE REDNESS: 0
EYE PAIN: 0
NAUSEA: 0
BACK PAIN: 1
CONSTIPATION: 0

## 2021-09-01 NOTE — PROGRESS NOTES
Other Mother     Heart Attack Father     Other Brother        Social History     Tobacco Use    Smoking status: Current Every Day Smoker     Packs/day: 1.00     Years: 30.00     Pack years: 30.00     Types: Cigarettes    Smokeless tobacco: Never Used   Substance Use Topics    Alcohol use:  Yes     Alcohol/week: 13.0 - 14.0 standard drinks     Types: 12 Cans of beer, 1 - 2 Shots of liquor per week     Comment: drinks daily 2 -12 beers daily      Current Outpatient Medications   Medication Sig Dispense Refill    Semaglutide 7 MG TABS Take 7 mg by mouth daily 30 tablet 3    pramipexole (MIRAPEX) 0.125 MG tablet Take 1 tablet by mouth nightly 30 tablet 3    fluticasone-salmeterol (ADVAIR DISKUS) 250-50 MCG/DOSE AEPB Inhale 1 puff into the lungs every 12 hours 60 each 3    metOLazone (ZAROXOLYN) 2.5 MG tablet Take 1 tablet by mouth every other day 30 tablet 0    furosemide (LASIX) 40 MG tablet Take 1 tablet by mouth 2 times daily 60 tablet 3    potassium chloride (KLOR-CON M) 10 MEQ extended release tablet Take 1 tablet by mouth daily 30 tablet 3    empagliflozin (JARDIANCE) 10 MG tablet Take 1 tablet by mouth daily 30 tablet 0    albuterol (ACCUNEB) 1.25 MG/3ML nebulizer solution Inhale 1 ampule into the lungs every 6 hours as needed      terbinafine (LAMISIL) 250 MG tablet Take 1 tablet by mouth 2 times daily 1 tablet 0    DULoxetine (CYMBALTA) 60 MG extended release capsule Take 1 capsule by mouth 2 times daily 30 capsule 0    cloNIDine (CATAPRES) 0.2 MG tablet Take 1 tablet by mouth 2 times daily 60 tablet 2    losartan (COZAAR) 100 MG tablet Take 1 tablet by mouth daily 30 tablet 3    montelukast (SINGULAIR) 10 MG tablet Take 1 tablet by mouth nightly 30 tablet 3    amLODIPine (NORVASC) 10 MG tablet Take 1 tablet by mouth daily 30 tablet 3    carvedilol (COREG) 25 MG tablet Take 25 mg by mouth 2 times daily       buPROPion (WELLBUTRIN XL) 300 MG extended release tablet Take 300 mg by mouth every morning      ALBUTEROL IN Inhale into the lungs every 4 hours as needed       atorvastatin (LIPITOR) 40 MG tablet Take 40 mg by mouth daily       levothyroxine (SYNTHROID) 25 MCG tablet Take 25 mcg by mouth Daily        No current facility-administered medications for this visit. Allergies   Allergen Reactions    Pcn [Penicillins] Anaphylaxis     From childhood       Health Maintenance   Topic Date Due    Hepatitis C screen  Never done    Pneumococcal 0-64 years Vaccine (1 of 2 - PPSV23) Never done    Diabetic foot exam  Never done    Diabetic retinal exam  Never done    COVID-19 Vaccine (1) Never done    HIV screen  Never done    Diabetic microalbuminuria test  Never done    Hepatitis B vaccine (1 of 3 - Risk 3-dose series) Never done    DTaP/Tdap/Td vaccine (1 - Tdap) Never done    Colon cancer screen colonoscopy  Never done    Shingles Vaccine (1 of 2) Never done    Low dose CT lung screening  08/11/2018    Flu vaccine (1) Never done    A1C test (Diabetic or Prediabetic)  07/12/2022    Lipid screen  07/12/2022    Potassium monitoring  07/30/2022    Creatinine monitoring  07/30/2022    Hepatitis A vaccine  Aged Out    Hib vaccine  Aged Out    Meningococcal (ACWY) vaccine  Aged Out       Subjective:      Review of Systems   Constitutional: Positive for fatigue. Negative for chills and fever. HENT: Negative for congestion, nosebleeds and postnasal drip. Eyes: Negative for pain and redness. Respiratory: Positive for cough and shortness of breath. Cardiovascular: Positive for leg swelling. Negative for chest pain. Gastrointestinal: Negative for constipation, diarrhea and nausea. Genitourinary: Positive for frequency (related to diuretics). Negative for difficulty urinating and dysuria. Musculoskeletal: Positive for back pain. Skin: Negative for rash and wound. Neurological: Negative for light-headedness and headaches.    Psychiatric/Behavioral: Positive for sleep disturbance. Negative for dysphoric mood. The patient is nervous/anxious. Objective:     /86   Pulse 111   Wt 293 lb 8 oz (133.1 kg)   SpO2 93%   BMI 39.81 kg/m²   Physical Exam  Vitals and nursing note reviewed. Constitutional:       Appearance: He is obese. HENT:      Head: Normocephalic and atraumatic. Right Ear: External ear normal.      Left Ear: External ear normal.   Cardiovascular:      Rate and Rhythm: Normal rate and regular rhythm. Heart sounds: Normal heart sounds. Comments: No carotid bruit  Pulmonary:      Effort: Pulmonary effort is normal.      Breath sounds: Decreased breath sounds present. Abdominal:      General: Bowel sounds are normal.      Palpations: Abdomen is soft. Musculoskeletal:      Right lower le+ Pitting Edema present. Left lower le+ Pitting Edema present. Skin:     General: Skin is warm and dry. Neurological:      Mental Status: He is alert and oriented to person, place, and time. Cranial Nerves: No cranial nerve deficit. Psychiatric:         Mood and Affect: Mood normal.         Thought Content: Thought content normal.         Judgment: Judgment normal.         Assessment/Plan:   1. Acute on chronic congestive heart failure, unspecified heart failure type ([address]  -     CBC With Auto Differential; Future  -     Basic Metabolic Panel; Future  -     Brain Natriuretic Peptide; Future  -     XR CHEST STANDARD (2 VW); Future  2. Essential hypertension  -     CBC With Auto Differential; Future  -     Basic Metabolic Panel; Future  3. Fatigue, unspecified type  -     CBC With Auto Differential; Future  -     Basic Metabolic Panel; Future     Complete lab work  Complete chest x-ray  Decrease smoking  Increase exercise  Complete sleep study    Return if symptoms worsen or fail to improve, for as scheduled.     Orders Placed This Encounter   Procedures    XR CHEST STANDARD (2 VW)     Standing Status:   Future     Standing Expiration Date:   9/1/2022     Order Specific Question:   Reason for exam:     Answer:   SOB    CBC With Auto Differential     Standing Status:   Future     Standing Expiration Date:   9/1/2022    Basic Metabolic Panel     Standing Status:   Future     Standing Expiration Date:   9/1/2022    Brain Natriuretic Peptide     Standing Status:   Future     Standing Expiration Date:   9/1/2022     No orders of the defined types were placed in this encounter. Patient given educational materials - see patient instructions. Discussed use, benefit, and side effects of prescribed medications. All patient questions answered. Pt voiced understanding. Reviewed health maintenance. Instructed to continue current medications, diet and exercise. Patient agreed with treatment plan. Follow up as directed.      Electronically signed by CASIMIRO Horowitz CNP on 9/1/2021 at 10:50 AM

## 2021-09-05 ENCOUNTER — HOSPITAL ENCOUNTER (OUTPATIENT)
Dept: LAB | Age: 51
Setting detail: SPECIMEN
Discharge: HOME | End: 2021-09-05
Payer: MEDICARE

## 2021-09-05 DIAGNOSIS — Z01.818 PREOP TESTING: Primary | ICD-10-CM

## 2021-09-05 PROCEDURE — U0005 INFEC AGEN DETEC AMPLI PROBE: HCPCS

## 2021-09-05 PROCEDURE — U0003 INFECTIOUS AGENT DETECTION BY NUCLEIC ACID (DNA OR RNA); SEVERE ACUTE RESPIRATORY SYNDROME CORONAVIRUS 2 (SARS-COV-2) (CORONAVIRUS DISEASE [COVID-19]), AMPLIFIED PROBE TECHNIQUE, MAKING USE OF HIGH THROUGHPUT TECHNOLOGIES AS DESCRIBED BY CMS-2020-01-R: HCPCS

## 2021-09-06 LAB
SARS-COV-2: NORMAL
SARS-COV-2: NOT DETECTED
SOURCE: NORMAL

## 2021-09-16 ENCOUNTER — HOSPITAL ENCOUNTER (OUTPATIENT)
Dept: LAB | Age: 51
Setting detail: SPECIMEN
Discharge: HOME | End: 2021-09-16
Payer: MEDICARE

## 2021-09-16 ENCOUNTER — HOSPITAL ENCOUNTER (OUTPATIENT)
Dept: SLEEP CENTER | Age: 51
Discharge: HOME | End: 2021-09-18
Payer: MEDICARE

## 2021-09-16 DIAGNOSIS — Z20.822 COVID-19 RULED OUT BY LABORATORY TESTING: ICD-10-CM

## 2021-09-16 DIAGNOSIS — G47.33 OSA (OBSTRUCTIVE SLEEP APNEA): ICD-10-CM

## 2021-09-16 LAB
SARS-COV-2, RAPID: NOT DETECTED
SPECIMEN DESCRIPTION: NORMAL

## 2021-09-16 PROCEDURE — 87635 SARS-COV-2 COVID-19 AMP PRB: CPT

## 2021-09-16 PROCEDURE — 95811 POLYSOM 6/>YRS CPAP 4/> PARM: CPT

## 2021-09-19 NOTE — PROGRESS NOTES
Good effort and understanding. Transcutaneous Hb 14. 3. 2 puffs of symbicort was given
pattern pattern of abnormality is consistent with obstructive airway disease such as COPD/emphysema. Comments:  Please correlate clinically.

## 2021-09-24 ENCOUNTER — TELEPHONE (OUTPATIENT)
Dept: GASTROENTEROLOGY | Age: 51
End: 2021-09-24

## 2021-09-24 LAB — STATUS: NORMAL

## 2021-09-24 NOTE — TELEPHONE ENCOUNTER
Called pt regarding referral; no answer; mailbox full; unable to leave VM. Mailed colon screen letter to home address.

## 2021-10-04 ENCOUNTER — OFFICE VISIT (OUTPATIENT)
Dept: PULMONOLOGY | Age: 51
End: 2021-10-04
Payer: MEDICARE

## 2021-10-04 VITALS
HEIGHT: 72 IN | TEMPERATURE: 98.4 F | OXYGEN SATURATION: 94 % | DIASTOLIC BLOOD PRESSURE: 88 MMHG | RESPIRATION RATE: 17 BRPM | SYSTOLIC BLOOD PRESSURE: 136 MMHG | HEART RATE: 106 BPM | BODY MASS INDEX: 40.23 KG/M2 | WEIGHT: 297 LBS

## 2021-10-04 DIAGNOSIS — J44.9 STAGE 2 MODERATE COPD BY GOLD CLASSIFICATION (HCC): Primary | ICD-10-CM

## 2021-10-04 DIAGNOSIS — G47.33 OSA (OBSTRUCTIVE SLEEP APNEA): ICD-10-CM

## 2021-10-04 PROCEDURE — 4004F PT TOBACCO SCREEN RCVD TLK: CPT | Performed by: INTERNAL MEDICINE

## 2021-10-04 PROCEDURE — G8484 FLU IMMUNIZE NO ADMIN: HCPCS | Performed by: INTERNAL MEDICINE

## 2021-10-04 PROCEDURE — 99214 OFFICE O/P EST MOD 30 MIN: CPT | Performed by: INTERNAL MEDICINE

## 2021-10-04 PROCEDURE — G8926 SPIRO NO PERF OR DOC: HCPCS | Performed by: INTERNAL MEDICINE

## 2021-10-04 PROCEDURE — G8427 DOCREV CUR MEDS BY ELIG CLIN: HCPCS | Performed by: INTERNAL MEDICINE

## 2021-10-04 PROCEDURE — 3017F COLORECTAL CA SCREEN DOC REV: CPT | Performed by: INTERNAL MEDICINE

## 2021-10-04 PROCEDURE — 3023F SPIROM DOC REV: CPT | Performed by: INTERNAL MEDICINE

## 2021-10-04 PROCEDURE — G8417 CALC BMI ABV UP PARAM F/U: HCPCS | Performed by: INTERNAL MEDICINE

## 2021-10-04 NOTE — PROGRESS NOTES
PULMONARY MEDICINE OUTPATIENT  FOLLOW UP NOTE                                                                       PATIENT:  Vivian Pabon  YOB: 1970  MRN: [id number]     REFERRED BY: CASIMIRO Kern CNP   CHIEF COMPLIANT: COPD (follow up)       HISTORY     HISTORY OF PRESENT ILLNESS:   Vivian Pabon is a 46y.o. year old male here for follow-up    Gold stage II COPD:  Patient  reports that he has been smoking cigarettes. He has a 30.00 pack-year smoking history. He has never used smokeless tobacco.  He was initially seen after hospitalization for acute exacerbation of COPD/CHF in July 2021. His PFT showed moderate obstructive ventilatory impairment, no significant bronchodilator response, significant air trapping and mild reduction in diffusion capacity. He has chronic cough and sputum. He is currently on Advair Diskus 250/50, Singulair and as needed albuterol. Morbid obesity/obstructive sleep apnea:  He is morbidly obese and known to have obstructive sleep apnea, however compliance is suboptimal for several years. He recently underwent another sleep study which showed AHI of 70. Subsequent follow-up study recommended CPAP at 16 cm water. He has not received his CPAP machine yet.        PAST MEDICAL HISTORY:      Diagnosis Date    Accelerated hypertension 8/12/2017    Arthritis     Atypical chest pain 8/12/2017    CHF (congestive heart failure) (HCC)     COPD (chronic obstructive pulmonary disease) (HCC)     Diabetes mellitus (HCC)     Grade I diastolic dysfunction 5/88/8614    Hyperlipidemia     Hypertension     Hypertensive emergency 7/11/2021    Hypertensive urgency     Noncompliance 8/12/2017    Restless legs syndrome     Sleep apnea     Tobacco abuse 8/12/2017    Type 2 diabetes mellitus without complication (HCC)     Type 2 diabetes mellitus, without long-term current use of insulin ([facility] [address] 7/12/2021     PAST SURGICAL HISTORY:      Procedure Laterality Date    ANKLE SURGERY       SOCIAL HISTORY:  TOBACCO:   reports that he has been smoking cigarettes. He has a 30.00 pack-year smoking history. He has never used smokeless tobacco.  ETOH:   reports current alcohol use of about 1.0 - 2.0 standard drinks of alcohol per week. DRUGS: reports current drug use. Frequency: 2.00 times per week. Drug: Marijuana.      AVOCATION/OCCUPATIONAL EXPOSURE:  [] Asbestos      [] Hot tub  [] Silica dust    [] Pets  [] Coal            [] Exotic birds  [] [employer]       [] Tuberculosis  [] Andry Pinch         [] Others  [] [place]          PHYSICAL EXAMINATION      VITAL SIGNS:   /88 (Site: Left Upper Arm, Position: Sitting)   Pulse 106   Temp 98.4 °F (36.9 °C) (Temporal)   Resp 17   Ht 6' (1.829 m)   Wt 297 lb (134.7 kg)   SpO2 94%   BMI 40.28 kg/m²   Wt Readings from Last 3 Encounters:   10/04/21 297 lb (134.7 kg)   09/01/21 293 lb 8 oz (133.1 kg)   08/17/21 289 lb 11.2 oz (131.4 kg)     SYSTEMIC EXAMINATION:   General appearance -  [x] well appearing  [] overweight  [x] morbidly obese [] cachectic [x] comfortable  [x] in no acute distress  [] chronically ill-appearing  [] in mild to moderate respiratory distress   Mental status -  [x] alert  [x] oriented to person, place, and time  [] anxious  [] depressed mood    Head-  [x] atraumatic  [x] normocephalic   Eyes -  [] pupils equal and reactive, extraocular eye movements intact  [] sclera anicteric   Ears -  [x] hearing grossly normal bilaterally [] bilateral TM's and external ear canals normal   Nose -  [x] normal and patent [] no erythema  [] discharge   Mouth -  [x] mucous membranes moist  [] pharynx normal without lesions [] erythematous  [] exudate noted   Mallampati Airway Score -  [] I (soft palate, uvula, fauces, tonsillar pillars visible) [] II (soft palate, uvula, fauces visible) []  III (soft palate, base of uvula visible) [] IV (only hard palate visible)   Neck -  [] supple  [] no significant adenopathy [] carotids upstroke normal bilaterally [] no JVD  [] no bruit   Lymphatics -  [x] no palpable lymphadenopathy  [] no hepatosplenomegaly   Chest -   [x] normal chest excursion  [x] no chest wall tenderness  [] increased AP diameter[] pectus noted [] scoliosis noted [x] no tachypnea retraction or cyanosis [] clear to auscultation, no wheezes, rales or rhonchi, symmetric air entry  [] wheezing noted  [] rales noted  []rhonchi noted [x] decreased air entry noted bilaterally   Heart -  [x] normal rate,  [x] regular rhythm  [] irregularly irregular rhythm [x] normal S1  [x] normal S2  [x] no murmurs, rubs, clicks or gallops  [] S3 present  [] S4 present  [] systolic murmur  [] diastolic murmur  [] midsystolic click []pericardial rub present    Abdomen -  [] soft [] nontender  [] nondistended  [] no masses or organomegaly   Neurological -  [x] normal speech  [x] no focal findings or movement disorder noted  [] cranial nerves II through XII intact  [] DTR's normal and symmetric  [] Babinski sign negative,  [x] motor and sensory grossly normal bilaterally  [] normal muscle tone [x] no tremors  [] strength 5/5  [] Romberg sign negative [] normal gait    Musculoskeletal -  [x] no joint tenderness [x] no deformity or swelling   Extremities - [x] no clubbing or cyanosis,  [x] no pedal edema [] pedal edema  [] intact peripheral pulses   Skin -  [x] normal coloration and turgor  [x] no rashes  [] no suspicious skin lesions noted          MEDICAL DECISION MAKING     1. PROBLEMS ADDRESSED (NUMBER AND COMPLEXITY)       ICD-10-CM    1. Stage 2 moderate COPD by GOLD classification (HCC)  J44.9    2. JONA (obstructive sleep apnea)  G47.33 DME Order for CPAP as OP        2.  DATA REVIEWED AND ANALYZED (AMOUNT AND/OR COMPLEXITY)   LABS:  ABG:   No results found for: PH, PHART, PCO2, XNQ6CLD, PO2, PO2ART, HCO3, FNL9CDJ, BE, BEART, THGB, P2ULQKTN  VBG:  No results found for: PHVEN, YQF0TSL, BEVEN, I5NOQXFP  CBC:   Lab Results Component Value Date    WBC 12.8 (H) 07/15/2021    RBC 5.96 (H) 07/15/2021    HGB 17.8 (H) 07/15/2021    HCT 55.5 (H) 07/15/2021     07/15/2021    MCV 93.2 07/15/2021    MCH 29.9 07/15/2021    MCHC 32.1 07/15/2021    RDW 15.7 (H) 07/15/2021    LYMPHOPCT 18 (L) 07/15/2021    MONOPCT 7 07/15/2021    BASOPCT 0 07/15/2021    MONOSABS 0.90 07/15/2021    LYMPHSABS 2.30 07/15/2021    EOSABS 0.10 07/15/2021    BASOSABS 0.00 07/15/2021    DIFFTYPE NOT REPORTED 07/15/2021     Allergen Panel:No results found for: REGVALL  Eosinophils/IgE:   Lab Results   Component Value Date    EOSABS 0.10 07/15/2021     Alpha 1 antitrypsin: No results found for: A1A  CMP:   Lab Results   Component Value Date     (L) 07/15/2021    K 3.6 (L) 07/15/2021    CL 90 (L) 07/15/2021    CO2 31 07/15/2021    BUN 33 (H) 07/15/2021    CREATININE 1.13 07/30/2021    GLUCOSE 205 07/30/2021    MG 2.2 07/13/2021    CALCIUM 8.9 07/15/2021    PROT 6.5 07/12/2021    LABALBU 3.7 07/12/2021    BILITOT 0.39 07/12/2021    ALT 43 (H) 07/12/2021    AST 23 07/12/2021    ALKPHOS 103 07/12/2021    LIPASE 18 07/11/2021     Coagulation Profile:   No results found for: INR, PROTIME, APTT  BNP:   Lab Results   Component Value Date    PROBNP 497 (H) 07/12/2021     D-Dimer/Fibrinogen:  No results found for: DDIMER  Others labs:  Lab Results   Component Value Date    TSH 0.98 08/11/2017     No results found for: RENEE, ANATITER, RHEUMFACTOR, RF, C3, C4, MPO, PR3, SEDRATE, CRP  No results found for: IRON, TIBC, FERRITIN, FOLATE, LDH  No results found for: SPEP, UPEP, PSA, CEA, , [id number],   No results found for: RPR, HIV    RADIOLOGY:  [] Ordered  [] Unavailable  [x] Reviewed  XR CHEST (SINGLE VIEW FRONTAL)  Result Date: 7/12/2021  FINDINGS: Cardiomegaly. Increased vascular congestion. No focal consolidation. No acute osseous abnormality is identified. Noted with thorax is seen. Cardiomegaly with worsening vascular congestion and perihilar edema. CT chest: 8/11/2017  Impression   Multiple enlarged mediastinum lymph nodes along the AP window as measured   above, otherwise unremarkable CTA of the chest with normal appearing aorta. PET scan:    ECHOCARDIOGRAM:  Results for orders placed during the hospital encounter of 07/11/21    ECHO Complete 2D W Doppler W Color    Narrative  Summary  Normal left ventricular size with normal hyperdynamic function. EF 60-65%. Moderate to severe concentric left ventricular hypertrophy. Grade I (mild) left ventricular diastolic dysfunction. Posterior mitral annular calcification. No significant valvular regurgitation or stenosis seen.     PULMONARY FUNCTION TEST:  [] Ordered  [] Unavailable  [x] Reviewed  7/28/2021  Moderate obstructive ventilatory impairment  No bronchodilator response  Severe air trapping  Mild reduction in diffusion capacity    6 MINUTE WALK TEST:  [] Ordered  [x] Unavailable  [] Reviewed    Distance Walk Predicted Distance LLN** Predicted % Duration (min) Duration of Stops Sec Lee Dyspnea Lee Fatigue               POLYSOMNOGRAM:  BASELINE SLEEP STUDY:   [] Ordered  [] Unavailable  [x] Reviewed 8/1/2021  Severe sleep apnea, AHI 70.9    TITRATION STUDY:   [] Ordered  [] Unavailable  [x] Reviewed 9/16/2021  Recommended CPAP at 16 cmH2O      CPAP/BIPAP COMPLIANCE DATA:   [x] Unavailable  [] Reviewed    ASSESSMENT OF EXCESSIVE DAYTIME SLEEPINESS (BY INDEPENDENT HISTORIAN):  Presto Sleepiness Scale:  [] Not obtained  [x] Obtained  Presto Sleepiness Scale:  Sleep Medicine 8/1/2021 7/21/2021   Sitting and reading 3 3   Watching TV 3 2   Sitting, inactive in a public place (e.g. a theatre or a meeting) 3 3   As a passenger in a car for an hour without a break 3 3   Lying down to rest in the afternoon when circumstances permit 3 3   Sitting and talking to someone 0 1   Sitting quietly after a lunch without alcohol 2 3   In a car, while stopped for a few minutes in traffic 0 0   Total score 17 18 PRIOR EXTERNAL NOTES:  [] Unavailable  [x] Reviewed    ORDERS PLACED:  Orders Placed This Encounter   Procedures    DME Order for CPAP as OP     CPAP 16 cm H2O    Heated Humidifier    Nasal Pillow 1 per 3 months and Replacement Pillows 2 per month    Headgear 1 per 6 months, Chin Strap 1 per 6 months, Disposable Filters 2 per month, Non-disposable Filters 1 per 6 months, Tubing 1 per 3 months, Chambers 1 per 6 months and Other Related Supplies  Replace supplies and accessories as needed. Patient may choose another interface for compliance and comfort. Comments:   Diagnosis:JONA  Length of need: 12 Months        3.  RISK OF COMPLICATIONS AND/OR MORBIDITY OR MORTALITY:     ALLERGIES:  Allergies   Allergen Reactions    Pcn [Penicillins] Anaphylaxis     From childhood      MEDICATIONS:  Outpatient Medications Prior to Visit   Medication Sig Dispense Refill    Semaglutide 7 MG TABS Take 7 mg by mouth daily 30 tablet 3    pramipexole (MIRAPEX) 0.125 MG tablet Take 1 tablet by mouth nightly 30 tablet 3    fluticasone-salmeterol (ADVAIR DISKUS) 250-50 MCG/DOSE AEPB Inhale 1 puff into the lungs every 12 hours 60 each 3    metOLazone (ZAROXOLYN) 2.5 MG tablet Take 1 tablet by mouth every other day 30 tablet 0    furosemide (LASIX) 40 MG tablet Take 1 tablet by mouth 2 times daily 60 tablet 3    potassium chloride (KLOR-CON M) 10 MEQ extended release tablet Take 1 tablet by mouth daily 30 tablet 3    empagliflozin (JARDIANCE) 10 MG tablet Take 1 tablet by mouth daily 30 tablet 0    albuterol (ACCUNEB) 1.25 MG/3ML nebulizer solution Inhale 1 ampule into the lungs every 6 hours as needed      terbinafine (LAMISIL) 250 MG tablet Take 1 tablet by mouth 2 times daily 1 tablet 0    DULoxetine (CYMBALTA) 60 MG extended release capsule Take 1 capsule by mouth 2 times daily 30 capsule 0    cloNIDine (CATAPRES) 0.2 MG tablet Take 1 tablet by mouth 2 times daily 60 tablet 2    losartan (COZAAR) 100 MG tablet Take 1 tablet by mouth daily 30 tablet 3    montelukast (SINGULAIR) 10 MG tablet Take 1 tablet by mouth nightly 30 tablet 3    amLODIPine (NORVASC) 10 MG tablet Take 1 tablet by mouth daily 30 tablet 3    carvedilol (COREG) 25 MG tablet Take 25 mg by mouth 2 times daily       buPROPion (WELLBUTRIN XL) 300 MG extended release tablet Take 300 mg by mouth every morning      ALBUTEROL IN Inhale into the lungs every 4 hours as needed       atorvastatin (LIPITOR) 40 MG tablet Take 40 mg by mouth daily       levothyroxine (SYNTHROID) 25 MCG tablet Take 25 mcg by mouth Daily        No facility-administered medications prior to visit. PRESCRIPTION DRUG MANAGEMENT/RECOMMENDATIONS:   Reviewed PFT   Recommended continuing Advair 250/50, Singulair and as needed albuterol   Current treatment plan is effective, no change in therapy   Reviewed use of rescue vs controlling agents and potential side effects   Reviewed use, techniques, schedule and side effects of all inhaled medications   Refills were provided as requested   Barriers to medication compliance addressed.  Patient to have prednisone and an antibiotic available for use during an exacerbation    SUPPLEMENTAL OXYGEN/NIV RECOMMENDATIONS:   Patient is not on home oxygen therapy. PULMONARY REHABILITATION:   Pulmonary rehabilitation was discussed and  information provided regarding the potential benefits including decrease in the hospitalization risk and improvement in dyspnea.     SLEEP DISORDER RECOMMENDATIONS:   Patient was initially diagnosed with sleep apnea on home sleep study few years back   He is returning after new sleep study which found severe sleep apnea with an AHI of 70.9   Titration study recommended CPAP at 16 cmH2O   Order placed for CPAP machine   Discussed importance of compliance to CPAP   CPAP to be used at least 4 hours every night   Weight loss recommended   Patient not to drive or operate heavy machinery, if sleepy    SMOKING CESSATION RECOMMENDATIONS/COUNSELING:   Recommended smoking cessation    LIFESTYLE MODIFICATION RECOMMENDATIONS/COUNSELING:   Follow healthy behaviors: nutrition, exercise and medication adherence   Maintain an active lifestyle    LUNG CANCER SCREENING/OTHER IMAGING RECOMMENDATIONS:   After reviewing the patient's smoking history, age and other clinical criteria, the LOW DOSE CT is : NOT INDICATED; Age is < 54 or > 77 years   We will follow up on CT scan ordered by primary care provider    IMMUNIZATION HISTORY/RECOMMENDATIONS:    There is no immunization history on file for this patient.  Recommend influenza vaccination in the fall annually    Recommendations were given regarding pneumococcal vaccination   Recommendations were given regarding COVID19 vaccination     All the questions that the patient had were answered to his satisfaction  We'll see the patient back in two months  Thank you for having us involved in the care of your patient. Please call us if you have any questions or concerns. Drue Lefort, MD  Pulmonary and Critical Care Medicine            Please note that this chart was generated using voice recognition Dragon dictation software. Although every effort was made to ensure the accuracy of this automated transcription, some errors in transcription may have occurred.

## 2021-10-05 ENCOUNTER — TELEPHONE (OUTPATIENT)
Dept: ONCOLOGY | Age: 51
End: 2021-10-05

## 2021-10-05 NOTE — LETTER
10/5/2021        [address] [place] [id number]    Dear Sonido Learn:    Your healthcare provider has ordered a low dose CT scan of the chest for lung cancer screening. You will find enclosed, information about CT lung screening. Please review the statement of understanding, you will be asked to sign a copy of this at the time of your CT scan    If you have not already been contacted to make the appointment for your scan, please call our scheduling department at [phone]    Keep in mind that CT lung screening does not take the place of smoking cessation. If you are a current smoker, you will find enclosed smoking cessation resources. Please do not hesitate to contact me if you have any questions or concerns.     [address] [place] [address],      [facility] Screening [facility]  [phone]

## 2021-10-08 DIAGNOSIS — I50.9 ACUTE ON CHRONIC CONGESTIVE HEART FAILURE, UNSPECIFIED HEART FAILURE TYPE (HCC): ICD-10-CM

## 2021-10-11 RX ORDER — METOLAZONE 2.5 MG/1
TABLET ORAL
Qty: 30 TABLET | Refills: 0 | Status: SHIPPED | OUTPATIENT
Start: 2021-10-11

## 2021-10-18 ENCOUNTER — OFFICE VISIT (OUTPATIENT)
Dept: PRIMARY CARE CLINIC | Age: 51
End: 2021-10-18
Payer: MEDICARE

## 2021-10-18 VITALS
DIASTOLIC BLOOD PRESSURE: 88 MMHG | WEIGHT: 303.5 LBS | BODY MASS INDEX: 41.16 KG/M2 | OXYGEN SATURATION: 96 % | HEART RATE: 96 BPM | SYSTOLIC BLOOD PRESSURE: 136 MMHG

## 2021-10-18 DIAGNOSIS — M79.642 PAIN IN BOTH HANDS: ICD-10-CM

## 2021-10-18 DIAGNOSIS — E11.9 TYPE 2 DIABETES MELLITUS WITHOUT COMPLICATION, WITHOUT LONG-TERM CURRENT USE OF INSULIN (HCC): ICD-10-CM

## 2021-10-18 DIAGNOSIS — M79.641 PAIN IN BOTH HANDS: ICD-10-CM

## 2021-10-18 DIAGNOSIS — R10.84 GENERALIZED ABDOMINAL PAIN: Primary | ICD-10-CM

## 2021-10-18 LAB — HBA1C MFR BLD: 10 %

## 2021-10-18 PROCEDURE — G8427 DOCREV CUR MEDS BY ELIG CLIN: HCPCS | Performed by: NURSE PRACTITIONER

## 2021-10-18 PROCEDURE — G8484 FLU IMMUNIZE NO ADMIN: HCPCS | Performed by: NURSE PRACTITIONER

## 2021-10-18 PROCEDURE — 99214 OFFICE O/P EST MOD 30 MIN: CPT | Performed by: NURSE PRACTITIONER

## 2021-10-18 PROCEDURE — G8417 CALC BMI ABV UP PARAM F/U: HCPCS | Performed by: NURSE PRACTITIONER

## 2021-10-18 PROCEDURE — 83036 HEMOGLOBIN GLYCOSYLATED A1C: CPT | Performed by: NURSE PRACTITIONER

## 2021-10-18 PROCEDURE — 3017F COLORECTAL CA SCREEN DOC REV: CPT | Performed by: NURSE PRACTITIONER

## 2021-10-18 PROCEDURE — 3046F HEMOGLOBIN A1C LEVEL >9.0%: CPT | Performed by: NURSE PRACTITIONER

## 2021-10-18 PROCEDURE — 4004F PT TOBACCO SCREEN RCVD TLK: CPT | Performed by: NURSE PRACTITIONER

## 2021-10-18 PROCEDURE — 2022F DILAT RTA XM EVC RTNOPTHY: CPT | Performed by: NURSE PRACTITIONER

## 2021-10-18 RX ORDER — EMPAGLIFLOZIN 25 MG/1
1 TABLET, FILM COATED ORAL DAILY
Qty: 30 TABLET | Refills: 5 | Status: SHIPPED | OUTPATIENT
Start: 2021-10-18

## 2021-10-18 NOTE — PROGRESS NOTES
[address] [address] PRIMARY CARE  [facility] [address]  [address] [place] [address]  Dept: [phone]    Stacey Herrera is a 46 y.o. male Established patient, who presents today for his medical conditions/complaints as noted below. Chief Complaint   Patient presents with    Wrist Pain     bilateral - pt c/o stabbing pain - started 4 weeks ago    Diabetes       HPI:     HPI  He is not checking blood sugar at home  He states he had about a month were  He did not eat correctly and did not take medication. He does not exercise. He has been having increased wrist and hand pain for the past few weeks. Pain at rest and with ROM. Some weakness in hands also. Denies numbness or tingling. He is smoking 1 ppd.   Reviewed prior notes Pulmonary  Reviewed previous      LDL Cholesterol (mg/dL)   Date Value   07/12/2021 78   08/12/2017 51       (goal LDL is <100)   AST (U/L)   Date Value   07/12/2021 23     ALT (U/L)   Date Value   07/12/2021 43 (H)     BUN (mg/dL)   Date Value   07/15/2021 33 (H)     Hemoglobin A1C (%)   Date Value   10/18/2021 10.0     TSH (mIU/L)   Date Value   08/11/2017 0.98     BP Readings from Last 3 Encounters:   10/18/21 136/88   10/04/21 136/88   09/01/21 138/86          (goal 120/80)    Past Medical History:   Diagnosis Date    Accelerated hypertension 8/12/2017    Arthritis     Atypical chest pain 8/12/2017    CHF (congestive heart failure) (HCC)     COPD (chronic obstructive pulmonary disease) (HCC)     Diabetes mellitus (HCC)     Grade I diastolic dysfunction 8/98/3434    Hyperlipidemia     Hypertension     Hypertensive emergency 7/11/2021    Hypertensive urgency     Noncompliance 8/12/2017    Restless legs syndrome     Sleep apnea     Tobacco abuse 8/12/2017    Type 2 diabetes mellitus without complication (HCC)     Type 2 diabetes mellitus, without long-term current use of insulin ([facility] [address] 7/12/2021      Past Surgical History:   Procedure Laterality Date    ANKLE SURGERY         Family History   Problem Relation Age of Onset    Other Mother     Heart Attack Father     Other Brother        Social History     Tobacco Use    Smoking status: Current Every Day Smoker     Packs/day: 1.00     Years: 30.00     Pack years: 30.00     Types: Cigarettes    Smokeless tobacco: Never Used   Substance Use Topics    Alcohol use:  Yes     Alcohol/week: 1.0 - 2.0 standard drinks     Types: 1 - 2 Shots of liquor per week     Comment: occassionally on weekends, beer in the past      Current Outpatient Medications   Medication Sig Dispense Refill    empagliflozin (JARDIANCE) 25 MG tablet Take 1 tablet by mouth daily 30 tablet 5    Semaglutide 14 MG TABS Take 1 tablet by mouth daily 30 tablet 3    diclofenac sodium (VOLTAREN) 1 % GEL Apply 2 g topically 4 times daily as needed for Pain 350 g 2    metOLazone (ZAROXOLYN) 2.5 MG tablet TAKE ONE TABLET BY MOUTH EVERY OTHER DAY 30 tablet 0    pramipexole (MIRAPEX) 0.125 MG tablet Take 1 tablet by mouth nightly 30 tablet 3    fluticasone-salmeterol (ADVAIR DISKUS) 250-50 MCG/DOSE AEPB Inhale 1 puff into the lungs every 12 hours 60 each 3    furosemide (LASIX) 40 MG tablet Take 1 tablet by mouth 2 times daily 60 tablet 3    potassium chloride (KLOR-CON M) 10 MEQ extended release tablet Take 1 tablet by mouth daily 30 tablet 3    albuterol (ACCUNEB) 1.25 MG/3ML nebulizer solution Inhale 1 ampule into the lungs every 6 hours as needed      terbinafine (LAMISIL) 250 MG tablet Take 1 tablet by mouth 2 times daily 1 tablet 0    DULoxetine (CYMBALTA) 60 MG extended release capsule Take 1 capsule by mouth 2 times daily 30 capsule 0    cloNIDine (CATAPRES) 0.2 MG tablet Take 1 tablet by mouth 2 times daily 60 tablet 2    losartan (COZAAR) 100 MG tablet Take 1 tablet by mouth daily 30 tablet 3    montelukast (SINGULAIR) 10 MG tablet Take 1 tablet by mouth nightly 30 tablet 3    amLODIPine (NORVASC) 10 MG tablet Take 1 tablet by mouth daily 30 tablet 3    carvedilol (COREG) 25 MG tablet Take 25 mg by mouth 2 times daily       buPROPion (WELLBUTRIN XL) 300 MG extended release tablet Take 300 mg by mouth every morning      ALBUTEROL IN Inhale into the lungs every 4 hours as needed       atorvastatin (LIPITOR) 40 MG tablet Take 40 mg by mouth daily       levothyroxine (SYNTHROID) 25 MCG tablet Take 25 mcg by mouth Daily        No current facility-administered medications for this visit. Allergies   Allergen Reactions    Pcn [Penicillins] Anaphylaxis     From childhood       Health Maintenance   Topic Date Due    Hepatitis C screen  Never done    Pneumococcal 0-64 years Vaccine (1 of 2 - PPSV23) Never done    Diabetic foot exam  Never done    Diabetic retinal exam  Never done    COVID-19 Vaccine (1) Never done    HIV screen  Never done    Diabetic microalbuminuria test  Never done    Hepatitis B vaccine (1 of 3 - Risk 3-dose series) Never done    DTaP/Tdap/Td vaccine (1 - Tdap) Never done    Colon cancer screen colonoscopy  Never done    Shingles Vaccine (1 of 2) Never done    Low dose CT lung screening  08/11/2018    Flu vaccine (1) Never done    A1C test (Diabetic or Prediabetic)  01/18/2022    Lipid screen  07/12/2022    Potassium monitoring  07/30/2022    Creatinine monitoring  07/30/2022    Hepatitis A vaccine  Aged Out    Hib vaccine  Aged Out    Meningococcal (ACWY) vaccine  Aged Out       Subjective:      Review of Systems   Constitutional: Positive for fatigue. HENT: Negative for nosebleeds and sinus pain. Respiratory: Positive for cough, shortness of breath and wheezing. Cardiovascular: Positive for leg swelling. Negative for chest pain and palpitations. Gastrointestinal: Negative for constipation, diarrhea and nausea. Genitourinary: Negative for difficulty urinating and dysuria. Musculoskeletal: Positive for back pain.         Bilateral wrist pain   Skin: Negative for rash and wound.   Neurological: Positive for headaches. Negative for tremors. Psychiatric/Behavioral: Negative for dysphoric mood and sleep disturbance. The patient is not nervous/anxious. Objective:     /88   Pulse 96   Wt (!) 303 lb 8 oz (137.7 kg)   SpO2 96%   BMI 41.16 kg/m²   Physical Exam  Constitutional:       Appearance: He is obese. HENT:      Head: Normocephalic and atraumatic. Right Ear: Tympanic membrane, ear canal and external ear normal.      Left Ear: Tympanic membrane, ear canal and external ear normal.   Cardiovascular:      Rate and Rhythm: Normal rate and regular rhythm. Heart sounds: Normal heart sounds. Pulmonary:      Breath sounds: Rales present. Abdominal:      General: Bowel sounds are normal. There is distension. Comments: Abdomen distended with generalized tenderness on palpation   Musculoskeletal:      Cervical back: Normal range of motion and neck supple. Right lower le+ Pitting Edema present. Left lower le+ Pitting Edema present. Comments: Bilateral wrist and hand tenderness on palpation and pain with active and passive ROM   Skin:     General: Skin is warm and dry. Neurological:      Mental Status: He is alert and oriented to person, place, and time. Motor: No weakness. Psychiatric:         Mood and Affect: Mood normal.         Thought Content: Thought content normal.         Judgment: Judgment normal.         Assessment/Plan:   1. Generalized abdominal pain  -     CT ABDOMEN WO CONTRAST Additional Contrast? Radiologist Recommendation; Future  2. Type 2 diabetes mellitus without complication, without long-term current use of insulin (HCC)  -     empagliflozin (JARDIANCE) 25 MG tablet;  Take 1 tablet by mouth daily, Disp-30 tablet, R-5Normal  -     Semaglutide 14 MG TABS; Take 1 tablet by mouth daily, Disp-30 tablet, R-3Normal  -     POCT glycosylated hemoglobin (Hb A1C)  3. Pain in both hands  -     diclofenac sodium (VOLTAREN) 1 % GEL; Apply 2 g topically 4 times daily as needed for Pain, Topical, 4 TIMES DAILY PRN Starting Mon 10/18/2021, Disp-350 g, R-2, Normal     Increase semaglutide to 14 mg daily  Increase empagliflozin  To 25 mg daily  Recommend check blood sugar 1-2x/day      Complete blood work  Complete CT of lungs  Complete CT of abdomen    Follow up with Dr. Bethel Lambert for colonoscopy    Diclofenac sodium gel apply to hand and wrist up to 4x/day for pain    Return in about 3 months (around 1/18/2022) for diabetes. Orders Placed This Encounter   Procedures    CT ABDOMEN WO CONTRAST Additional Contrast? Radiologist Recommendation     Standing Status:   Future     Standing Expiration Date:   10/18/2022     Order Specific Question:   Additional Contrast?     Answer:   Radiologist Recommendation     Order Specific Question:   Reason for exam:     Answer:   pain and distension    POCT glycosylated hemoglobin (Hb A1C)     Orders Placed This Encounter   Medications    empagliflozin (JARDIANCE) 25 MG tablet     Sig: Take 1 tablet by mouth daily     Dispense:  30 tablet     Refill:  5    Semaglutide 14 MG TABS     Sig: Take 1 tablet by mouth daily     Dispense:  30 tablet     Refill:  3    diclofenac sodium (VOLTAREN) 1 % GEL     Sig: Apply 2 g topically 4 times daily as needed for Pain     Dispense:  350 g     Refill:  2       Patient given educational materials - see patient instructions. Discussed use, benefit, and side effects of prescribed medications. All patient questions answered. Pt voiced understanding. Reviewed health maintenance. Instructed to continue current medications, diet and exercise. Patient agreed with treatment plan. Follow up as directed.      Electronically signed by CASIMIRO Duong CNP on 10/19/2021 at 12:44 AM

## 2021-10-18 NOTE — PATIENT INSTRUCTIONS
Increase semaglutide to 14 mg daily  Increase empagliflozin  To 25 mg daily  Recommend check blood sugar 1-2x/day      Complete blood work  Complete CT of lungs  Complete CT of abdomen    Follow up with Dr. Rian Flores for colonoscopy    Diclofenac sodium gel apply to hand and wrist up to 4x/day for pain

## 2021-10-19 ASSESSMENT — ENCOUNTER SYMPTOMS
COUGH: 1
CONSTIPATION: 0
BACK PAIN: 1
SINUS PAIN: 0
NAUSEA: 0
WHEEZING: 1
SHORTNESS OF BREATH: 1
DIARRHEA: 0

## 2021-10-27 ENCOUNTER — TELEPHONE (OUTPATIENT)
Dept: PRIMARY CARE CLINIC | Age: 51
End: 2021-10-27

## 2021-10-27 DIAGNOSIS — R10.84 GENERALIZED ABDOMINAL PAIN: Primary | ICD-10-CM

## 2021-10-27 NOTE — TELEPHONE ENCOUNTER
CT order needs to be changed per radiology.  Should be CT abd and pelvis w/contrast. Is scheduled for Friday

## 2021-10-28 ENCOUNTER — HOSPITAL ENCOUNTER (OUTPATIENT)
Dept: GENERAL RADIOLOGY | Age: 51
Discharge: HOME | End: 2021-10-30
Payer: MEDICARE

## 2021-10-28 ENCOUNTER — HOSPITAL ENCOUNTER (OUTPATIENT)
Age: 51
Discharge: HOME | End: 2021-10-28
Payer: MEDICARE

## 2021-10-28 ENCOUNTER — HOSPITAL ENCOUNTER (OUTPATIENT)
Dept: CT IMAGING | Age: 51
Discharge: HOME | End: 2021-10-30
Payer: MEDICARE

## 2021-10-28 ENCOUNTER — HOSPITAL ENCOUNTER (OUTPATIENT)
Age: 51
Discharge: HOME | End: 2021-10-30
Payer: MEDICARE

## 2021-10-28 DIAGNOSIS — R53.83 FATIGUE, UNSPECIFIED TYPE: ICD-10-CM

## 2021-10-28 DIAGNOSIS — R10.84 GENERALIZED ABDOMINAL PAIN: ICD-10-CM

## 2021-10-28 DIAGNOSIS — I50.9 ACUTE ON CHRONIC CONGESTIVE HEART FAILURE, UNSPECIFIED HEART FAILURE TYPE (HCC): ICD-10-CM

## 2021-10-28 DIAGNOSIS — I10 ESSENTIAL HYPERTENSION: ICD-10-CM

## 2021-10-28 LAB
ABSOLUTE EOS #: 0.26 K/UL (ref 0–0.44)
ABSOLUTE IMMATURE GRANULOCYTE: 0.04 K/UL (ref 0–0.3)
ABSOLUTE LYMPH #: 2.34 K/UL (ref 1.1–3.7)
ABSOLUTE MONO #: 0.74 K/UL (ref 0.1–1.2)
ANION GAP SERPL CALCULATED.3IONS-SCNC: 12 MMOL/L (ref 9–17)
BASOPHILS # BLD: 1 % (ref 0–2)
BASOPHILS ABSOLUTE: 0.08 K/UL (ref 0–0.2)
BNP INTERPRETATION: NORMAL
BUN BLDV-MCNC: 20 MG/DL (ref 6–20)
BUN/CREAT BLD: ABNORMAL (ref 9–20)
CALCIUM SERPL-MCNC: 9.8 MG/DL (ref 8.6–10.4)
CHLORIDE BLD-SCNC: 96 MMOL/L (ref 98–107)
CO2: 28 MMOL/L (ref 20–31)
CREAT SERPL-MCNC: 1.24 MG/DL (ref 0.7–1.2)
DIFFERENTIAL TYPE: ABNORMAL
EOSINOPHILS RELATIVE PERCENT: 2 % (ref 1–4)
GFR AFRICAN AMERICAN: >60 ML/MIN
GFR NON-AFRICAN AMERICAN: >60 ML/MIN
GFR SERPL CREATININE-BSD FRML MDRD: ABNORMAL ML/MIN/{1.73_M2}
GFR SERPL CREATININE-BSD FRML MDRD: ABNORMAL ML/MIN/{1.73_M2}
GLUCOSE BLD-MCNC: 288 MG/DL (ref 70–99)
HCT VFR BLD CALC: 51.4 % (ref 40.7–50.3)
HEMOGLOBIN: 16.7 G/DL (ref 13–17)
IMMATURE GRANULOCYTES: 0 %
LYMPHOCYTES # BLD: 21 % (ref 24–43)
MCH RBC QN AUTO: 31.2 PG (ref 25.2–33.5)
MCHC RBC AUTO-ENTMCNC: 32.5 G/DL (ref 28.4–34.8)
MCV RBC AUTO: 95.9 FL (ref 82.6–102.9)
MONOCYTES # BLD: 7 % (ref 3–12)
NRBC AUTOMATED: 0 PER 100 WBC
PDW BLD-RTO: 15.4 % (ref 11.8–14.4)
PLATELET # BLD: 258 K/UL (ref 138–453)
PLATELET ESTIMATE: ABNORMAL
PMV BLD AUTO: 10.8 FL (ref 8.1–13.5)
POTASSIUM SERPL-SCNC: 4.7 MMOL/L (ref 3.7–5.3)
PRO-BNP: 52 PG/ML
RBC # BLD: 5.36 M/UL (ref 4.21–5.77)
RBC # BLD: ABNORMAL 10*6/UL
SEG NEUTROPHILS: 69 % (ref 36–65)
SEGMENTED NEUTROPHILS ABSOLUTE COUNT: 7.8 K/UL (ref 1.5–8.1)
SODIUM BLD-SCNC: 136 MMOL/L (ref 135–144)
WBC # BLD: 11.3 K/UL (ref 3.5–11.3)
WBC # BLD: ABNORMAL 10*3/UL

## 2021-10-28 PROCEDURE — 85025 COMPLETE CBC W/AUTO DIFF WBC: CPT

## 2021-10-28 PROCEDURE — 36415 COLL VENOUS BLD VENIPUNCTURE: CPT

## 2021-10-28 PROCEDURE — 71046 X-RAY EXAM CHEST 2 VIEWS: CPT

## 2021-10-28 PROCEDURE — 6360000004 HC RX CONTRAST MEDICATION: Performed by: NURSE PRACTITIONER

## 2021-10-28 PROCEDURE — 83880 ASSAY OF NATRIURETIC PEPTIDE: CPT

## 2021-10-28 PROCEDURE — 74177 CT ABD & PELVIS W/CONTRAST: CPT

## 2021-10-28 PROCEDURE — 2580000003 HC RX 258: Performed by: NURSE PRACTITIONER

## 2021-10-28 PROCEDURE — 80048 BASIC METABOLIC PNL TOTAL CA: CPT

## 2021-10-28 RX ORDER — 0.9 % SODIUM CHLORIDE 0.9 %
80 INTRAVENOUS SOLUTION INTRAVENOUS ONCE
Status: COMPLETED | OUTPATIENT
Start: 2021-10-28 | End: 2021-10-28

## 2021-10-28 RX ORDER — SODIUM CHLORIDE 0.9 % (FLUSH) 0.9 %
10 SYRINGE (ML) INJECTION PRN
Status: DISCONTINUED | OUTPATIENT
Start: 2021-10-28 | End: 2021-10-31 | Stop reason: HOSPADM

## 2021-10-28 RX ADMIN — SODIUM CHLORIDE, PRESERVATIVE FREE 10 ML: 5 INJECTION INTRAVENOUS at 11:34

## 2021-10-28 RX ADMIN — IOPAMIDOL 100 ML: 755 INJECTION, SOLUTION INTRAVENOUS at 11:33

## 2021-10-28 RX ADMIN — IOHEXOL 50 ML: 240 INJECTION, SOLUTION INTRATHECAL; INTRAVASCULAR; INTRAVENOUS; ORAL at 11:33

## 2021-10-28 RX ADMIN — SODIUM CHLORIDE 80 ML: 9 INJECTION, SOLUTION INTRAVENOUS at 11:33

## 2021-10-28 NOTE — TELEPHONE ENCOUNTER
Confirmed with Rachel at [facility] that they want contrast.     She stated that was correct.

## 2021-11-09 ENCOUNTER — OFFICE VISIT (OUTPATIENT)
Dept: GASTROENTEROLOGY | Age: 51
End: 2021-11-09
Payer: MEDICARE

## 2021-11-09 VITALS
DIASTOLIC BLOOD PRESSURE: 81 MMHG | WEIGHT: 295.7 LBS | HEART RATE: 110 BPM | HEIGHT: 72 IN | OXYGEN SATURATION: 95 % | SYSTOLIC BLOOD PRESSURE: 114 MMHG | BODY MASS INDEX: 40.05 KG/M2

## 2021-11-09 DIAGNOSIS — K21.9 CHRONIC GERD: Primary | ICD-10-CM

## 2021-11-09 DIAGNOSIS — Z12.11 SCREEN FOR COLON CANCER: ICD-10-CM

## 2021-11-09 PROCEDURE — G8417 CALC BMI ABV UP PARAM F/U: HCPCS | Performed by: INTERNAL MEDICINE

## 2021-11-09 PROCEDURE — 99204 OFFICE O/P NEW MOD 45 MIN: CPT | Performed by: INTERNAL MEDICINE

## 2021-11-09 PROCEDURE — 3017F COLORECTAL CA SCREEN DOC REV: CPT | Performed by: INTERNAL MEDICINE

## 2021-11-09 PROCEDURE — 4004F PT TOBACCO SCREEN RCVD TLK: CPT | Performed by: INTERNAL MEDICINE

## 2021-11-09 PROCEDURE — G8427 DOCREV CUR MEDS BY ELIG CLIN: HCPCS | Performed by: INTERNAL MEDICINE

## 2021-11-09 PROCEDURE — G8484 FLU IMMUNIZE NO ADMIN: HCPCS | Performed by: INTERNAL MEDICINE

## 2021-11-09 RX ORDER — BISACODYL 5 MG
TABLET, DELAYED RELEASE (ENTERIC COATED) ORAL
Qty: 2 TABLET | Refills: 0 | Status: SHIPPED | OUTPATIENT
Start: 2021-11-09

## 2021-11-09 RX ORDER — POLYETHYLENE GLYCOL 3350 17 G/17G
POWDER, FOR SOLUTION ORAL
Qty: 238 G | Refills: 0 | Status: SHIPPED | OUTPATIENT
Start: 2021-11-09

## 2021-11-09 ASSESSMENT — ENCOUNTER SYMPTOMS
BACK PAIN: 0
APNEA: 0
ABDOMINAL PAIN: 0
BLOOD IN STOOL: 0
WHEEZING: 0
VOMITING: 0
CHOKING: 0
CONSTIPATION: 0
SHORTNESS OF BREATH: 0
TROUBLE SWALLOWING: 0
SORE THROAT: 0
NAUSEA: 0
COUGH: 0
VOICE CHANGE: 0
DIARRHEA: 0
ABDOMINAL DISTENTION: 1

## 2021-11-09 NOTE — PROGRESS NOTES
Reason for Referral:   No referring provider defined for this encounter. Chief Complaint   Patient presents with    New Patient     colon screen referral , never had a colonoscopy before. 1. Chronic GERD    2. Screen for colon cancer            HISTORY OF PRESENT ILLNESS: Mitzi Gambino is a 46 y.o. male with a past history remarkable for , referred for evaluation of   Chief Complaint   Patient presents with    New Patient     colon screen referral , never had a colonoscopy before. .   Patient seen to discuss regarding colonoscopy. He never had colon examination in the past.  He is interested to have screening colonoscopy. On further discussion patient has some abdominal distention bloating. Denies constipation, diarrhea, melena or hematochezia. He does have chronic GERD. Has been taking Rolaids all the time. No dysphagia. Has  nocturnal symptoms of GERD. Has good appetite and there is no weight loss. He does drink alcohol on daily basis. Is also a chronic smoker. No prior history of known liver disease. He has diabetes not well controlled. At present hemoglobin A1c is about 10. Also known to have congestive heart failure. At present he denies chest pain, palpitations, shortness of breath etc.  Overall he is doing well. Medications reviewed  Past Medical,Family, and Social History reviewed and does contribute to the patient presentingcondition. Patient's PMH/PSH,SH,PSYCH Hx, MEDs, ALLERGIES, and ROS were all reviewed and updated in the appropriate sections.     PAST MEDICAL HISTORY:  Past Medical History:   Diagnosis Date    Accelerated hypertension 8/12/2017    Arthritis     Atypical chest pain 8/12/2017    CHF (congestive heart failure) (HCC)     COPD (chronic obstructive pulmonary disease) (HCC)     Diabetes mellitus (HCC)     Grade I diastolic dysfunction 5/17/4011    Hyperlipidemia     Hypertension     Hypertensive emergency 7/11/2021    Hypertensive urgency     Noncompliance 8/12/2017    Restless legs syndrome     Sleep apnea     Tobacco abuse 8/12/2017    Type 2 diabetes mellitus without complication (HCC)     Type 2 diabetes mellitus, without long-term current use of insulin ([facility] 7/12/2021       Past Surgical History:   Procedure Laterality Date    ANKLE SURGERY         CURRENT MEDICATIONS:    Current Outpatient Medications:     empagliflozin (JARDIANCE) 25 MG tablet, Take 1 tablet by mouth daily, Disp: 30 tablet, Rfl: 5    Semaglutide 14 MG TABS, Take 1 tablet by mouth daily, Disp: 30 tablet, Rfl: 3    diclofenac sodium (VOLTAREN) 1 % GEL, Apply 2 g topically 4 times daily as needed for Pain, Disp: 350 g, Rfl: 2    metOLazone (ZAROXOLYN) 2.5 MG tablet, TAKE ONE TABLET BY MOUTH EVERY OTHER DAY, Disp: 30 tablet, Rfl: 0    pramipexole (MIRAPEX) 0.125 MG tablet, Take 1 tablet by mouth nightly, Disp: 30 tablet, Rfl: 3    fluticasone-salmeterol (ADVAIR DISKUS) 250-50 MCG/DOSE AEPB, Inhale 1 puff into the lungs every 12 hours, Disp: 60 each, Rfl: 3    furosemide (LASIX) 40 MG tablet, Take 1 tablet by mouth 2 times daily, Disp: 60 tablet, Rfl: 3    potassium chloride (KLOR-CON M) 10 MEQ extended release tablet, Take 1 tablet by mouth daily, Disp: 30 tablet, Rfl: 3    albuterol (ACCUNEB) 1.25 MG/3ML nebulizer solution, Inhale 1 ampule into the lungs every 6 hours as needed, Disp: , Rfl:     terbinafine (LAMISIL) 250 MG tablet, Take 1 tablet by mouth 2 times daily, Disp: 1 tablet, Rfl: 0    DULoxetine (CYMBALTA) 60 MG extended release capsule, Take 1 capsule by mouth 2 times daily, Disp: 30 capsule, Rfl: 0    cloNIDine (CATAPRES) 0.2 MG tablet, Take 1 tablet by mouth 2 times daily, Disp: 60 tablet, Rfl: 2    losartan (COZAAR) 100 MG tablet, Take 1 tablet by mouth daily, Disp: 30 tablet, Rfl: 3    montelukast (SINGULAIR) 10 MG tablet, Take 1 tablet by mouth nightly, Disp: 30 tablet, Rfl: 3    amLODIPine (NORVASC) 10 MG tablet, Take 1 tablet by mouth daily, Disp: 30 tablet, Rfl: 3    carvedilol (COREG) 25 MG tablet, Take 25 mg by mouth 2 times daily , Disp: , Rfl:     buPROPion (WELLBUTRIN XL) 300 MG extended release tablet, Take 300 mg by mouth every morning, Disp: , Rfl:     ALBUTEROL IN, Inhale into the lungs every 4 hours as needed , Disp: , Rfl:     levothyroxine (SYNTHROID) 25 MCG tablet, Take 25 mcg by mouth Daily , Disp: , Rfl:     atorvastatin (LIPITOR) 40 MG tablet, Take 40 mg by mouth daily  (Patient not taking: Reported on 11/9/2021), Disp: , Rfl:     ALLERGIES:   Allergies   Allergen Reactions    Pcn [Penicillins] Anaphylaxis     From childhood       FAMILY HISTORY:       Problem Relation Age of Onset    Other Mother     Heart Attack Father     Other Brother          SOCIAL HISTORY:   Social History     Socioeconomic History    Marital status: Single     Spouse name: Not on file    Number of children: Not on file    Years of education: Not on file    Highest education level: Not on file   Occupational History    Not on file   Tobacco Use    Smoking status: Current Every Day Smoker     Packs/day: 1.00     Years: 30.00     Pack years: 30.00     Types: Cigarettes    Smokeless tobacco: Never Used   Vaping Use    Vaping Use: Former   Substance and Sexual Activity    Alcohol use:  Yes     Alcohol/week: 1.0 - 2.0 standard drink     Types: 1 - 2 Shots of liquor per week     Comment: occassionally on weekends, beer in the past    Drug use: Yes     Frequency: 2.0 times per week     Types: Marijuana Arnel Blow)     Comment: edibles     Sexual activity: Not on file   Other Topics Concern    Not on file   Social History Narrative    Not on file     Social Determinants of Health     Financial Resource Strain: Medium Risk    Difficulty of Paying Living Expenses: Somewhat hard   Food Insecurity: No Food Insecurity    Worried About Running Out of Food in the Last Year: Never true    Jaquelin of Food in the Last Year: Never true Transportation Needs:     Lack of Transportation (Medical): Not on file    Lack of Transportation (Non-Medical): Not on file   Physical Activity:     Days of Exercise per Week: Not on file    Minutes of Exercise per Session: Not on file   Stress:     Feeling of Stress : Not on file   Social Connections:     Frequency of Communication with Friends and Family: Not on file    Frequency of Social Gatherings with Friends and Family: Not on file    Attends Christianity Services: Not on file    Active Member of [address] [place] [address] or Organizations: Not on file    Attends Club or Organization Meetings: Not on file    Marital Status: Not on file   Intimate Partner Violence:     Fear of Current or Ex-Partner: Not on file    Emotionally Abused: Not on file    Physically Abused: Not on file    Sexually Abused: Not on file   Housing Stability:     Unable to Pay for Housing in the Last Year: Not on file    Number of [place] in the Last Year: Not on file    Unstable Housing in the Last Year: Not on file       REVIEW OF SYSTEMS:       Review of Systems   Constitutional: Negative for appetite change, fatigue, fever and unexpected weight change. HENT: Negative for mouth sores, sore throat, trouble swallowing and voice change. Eyes:        No ictirus   Respiratory: Negative for apnea, cough, choking, shortness of breath and wheezing. Cardiovascular: Negative for chest pain, palpitations and leg swelling. Gastrointestinal: Positive for abdominal distention. Negative for abdominal pain, blood in stool, constipation, diarrhea, nausea and vomiting. Endocrine: Negative for polydipsia, polyphagia and polyuria. Genitourinary: Negative for frequency, hematuria and urgency. Musculoskeletal: Positive for arthralgias. Negative for back pain, gait problem and joint swelling. Skin: Negative for pallor and rash. Allergic/Immunologic: Negative for food allergies.    Neurological: Negative for dizziness, seizures, weakness Content: Thought content normal.           LABORATORY DATA: Reviewed  Lab Results   Component Value Date    WBC 11.3 10/28/2021    HGB 16.7 10/28/2021    HCT 51.4 (H) 10/28/2021    MCV 95.9 10/28/2021     10/28/2021     10/28/2021    K 4.7 10/28/2021    CL 96 (L) 10/28/2021    CO2 28 10/28/2021    BUN 20 10/28/2021    CREATININE 1.24 (H) 10/28/2021    LABALBU 3.7 07/12/2021    BILITOT 0.39 07/12/2021    ALKPHOS 103 07/12/2021    AST 23 07/12/2021    ALT 43 (H) 07/12/2021         Lab Results   Component Value Date    RBC 5.36 10/28/2021    HGB 16.7 10/28/2021    MCV 95.9 10/28/2021    MCH 31.2 10/28/2021    MCHC 32.5 10/28/2021    RDW 15.4 (H) 10/28/2021    MPV 10.8 10/28/2021    BASOPCT 1 10/28/2021    LYMPHSABS 2.34 10/28/2021    MONOSABS 0.74 10/28/2021    NEUTROABS 7.80 10/28/2021    EOSABS 0.26 10/28/2021    BASOSABS 0.08 10/28/2021         DIAGNOSTIC TESTING:     XR CHEST (2 VW)    Result Date: 10/28/2021  EXAMINATION: TWO XRAY VIEWS OF THE CHEST 10/28/2021 10:34 am COMPARISON: Chest, single view 07/12/2021 HISTORY: ORDERING SYSTEM PROVIDED HISTORY: Acute on chronic congestive heart failure, unspecified heart failure type [facility]) TECHNOLOGIST PROVIDED HISTORY: SOB Reason for Exam: Some SOB, CHF. Hx of COPD Acuity: Acute Type of Exam: Initial FINDINGS: PA and lateral views of the chest were obtained on 3 images. Heart, mediastinum, and pulmonary vasculature are within normal limits. Lungs and pleural spaces are clear. No active cardiopulmonary disease.      CT ABDOMEN PELVIS W IV CONTRAST Additional Contrast? Radiologist Recommendation    Addendum Date: 10/29/2021    ADDENDUM: Follow-up imaging of the abdominal aorta is recommended in 3 years     Result Date: 10/29/2021  EXAMINATION: CT OF THE ABDOMEN AND PELVIS WITH CONTRAST 10/28/2021 10:24 am TECHNIQUE: CT of the abdomen and pelvis was performed with the administration of intravenous contrast. Multiplanar reformatted images are provided for review. Dose modulation, iterative reconstruction, and/or weight based adjustment of the mA/kV was utilized to reduce the radiation dose to as low as reasonably achievable. COMPARISON: None available HISTORY: ORDERING SYSTEM PROVIDED HISTORY: Generalized abdominal pain TECHNOLOGIST PROVIDED HISTORY: pain - order per radiology recommendation Reason for Exam: abdominal distension, pain Acuity: Acute Type of Exam: Initial FINDINGS: Lower Chest: A few hazy ground-glass opacities are present likely due to hypo stasis or small airway disease. There is evidence of prior granulomatous disease. No consolidation or pleural fluid. Heart appears normal in size. Minimal calcification of the left coronary artery is present. Organs: Moderate to severe hepatomegaly is noted with diffuse hepatic steatosis. Gallbladder and bile ducts appear normal.  Spleen is normal. Stomach has a normal configuration. GI/Bowel: No CT evidence of bowel obstruction or inflammation. The appendix is normal. Pelvis: Urinary bladder is contracted but otherwise normal.  Prostate gland is unremarkable. No evidence of abdominal wall hernia although there appears to be a lipoma beneath the external oblique muscle on the left flank. No evidence of spigelian hernia. Peritoneum/Retroperitoneum: No pleural fluid or free air. No retroperitoneal adenopathy. There is a mild fusiform aneurysm of the lower abdominal aorta below the level of renal arteries measuring 3 cm in maximal diameter and approximately 4.7 cm in total length. Slight ectasia of the left common iliac artery without evidence of aneurysm. Kidneys show appropriate cortical enhancement with multiple small foc Al low-density lesions compatible with renal cortical cysts. Bones/Soft Tissues: Mild degenerative disc disease at L4-5 and L5-S1. Osseous structures are within normal limits. Bone density is normal.     1.  Moderate to severe hepatomegaly and hepatic steatosis.  2.  Infrarenal abdominal aortic aneurysm measuring 3 mm in maximal diameter. No evidence of leak. IMPRESSION: Mr. Edis Wray is a 46 y.o. male with     Assessment:  1. Chronic GERD    2. Screen for colon cancer        Plan: At present no signs of congestive heart failure. Patient appears stable for colonoscopy. Given that he has a chronic GERD, overweight, and history of alcoholism, he may need EGD to evaluate esophageal pathology such as Strickland's etc.    Discussed with the patient regarding this procedures, adequate colon preparation, risks and benefits. Patient understood and verbalized the consent. Spent 30 minutes providing patient education and counseling. Thank you for allowing me to participate in the care of Mr. Edis Wray. For any further questions please do not hesitate to contact me. Note is dictated utilizing voice recognition software. Unfortunately this leads to occasional typographical errors. Please contact our office if you have any questions. I have reviewed and agree with the MA/LPN ROS.      Marietta Lynch MD, Jarad Baugh [facility]  Board Certified in Gastroenterology and [address] [place] [address] Gastroenterology  Office #: [phone]

## 2021-11-11 ENCOUNTER — HOSPITAL ENCOUNTER (OUTPATIENT)
Dept: PREADMISSION TESTING | Age: 51
Discharge: HOME | End: 2021-11-15
Payer: MEDICARE

## 2021-11-11 VITALS — WEIGHT: 295 LBS | HEIGHT: 71 IN | BODY MASS INDEX: 41.3 KG/M2

## 2021-11-11 NOTE — PROGRESS NOTES

## 2021-11-16 ENCOUNTER — TELEPHONE (OUTPATIENT)
Dept: GASTROENTEROLOGY | Age: 51
End: 2021-11-16

## 2021-11-16 NOTE — TELEPHONE ENCOUNTER
Pt called and LVM,states he lost his prep and prep instructions and thinks he has a COVID test coming up but doesn't know what it is scheduled. Writer called pt back, went over step by step instructions with the pt & advised him his COVID test is schedule on 11/18 @ [address] [facility] @1:20P. Pt states he verbally understands and will call us if he has any questions.

## 2021-11-18 ENCOUNTER — HOSPITAL ENCOUNTER (OUTPATIENT)
Dept: LAB | Age: 51
Setting detail: SPECIMEN
Discharge: HOME | End: 2021-11-18
Payer: MEDICARE

## 2021-11-18 DIAGNOSIS — Z01.818 PREOP TESTING: Primary | ICD-10-CM

## 2021-11-18 PROCEDURE — U0005 INFEC AGEN DETEC AMPLI PROBE: HCPCS

## 2021-11-18 PROCEDURE — U0003 INFECTIOUS AGENT DETECTION BY NUCLEIC ACID (DNA OR RNA); SEVERE ACUTE RESPIRATORY SYNDROME CORONAVIRUS 2 (SARS-COV-2) (CORONAVIRUS DISEASE [COVID-19]), AMPLIFIED PROBE TECHNIQUE, MAKING USE OF HIGH THROUGHPUT TECHNOLOGIES AS DESCRIBED BY CMS-2020-01-R: HCPCS

## 2021-11-20 LAB
SARS-COV-2: ABNORMAL
SARS-COV-2: ABNORMAL
SOURCE: ABNORMAL

## 2021-11-22 NOTE — TELEPHONE ENCOUNTER
Rec'd msg from [facility] surgery schedulers that pt's EGD/colon was cancelled d/t +covid test.  Per surgery scheduler, they called & left msg on pt's vm cancelling procedure. Writer left msg pt's vm asking pt to call back to discuss rescheduling procs.

## 2021-11-24 ENCOUNTER — TELEPHONE (OUTPATIENT)
Dept: PRIMARY CARE CLINIC | Age: 51
End: 2021-11-24

## 2021-11-24 DIAGNOSIS — M79.671 BILATERAL FOOT PAIN: Primary | ICD-10-CM

## 2021-11-24 DIAGNOSIS — M79.672 BILATERAL FOOT PAIN: Primary | ICD-10-CM

## 2021-12-16 ENCOUNTER — TELEPHONE (OUTPATIENT)
Dept: PULMONOLOGY | Age: 51
End: 2021-12-16

## 2021-12-16 NOTE — LETTER
[address] [facility] [address]  Phone: [phone]  Fax: [address], [address], [address] [facility]        December 17, 2021    [address] [facility]      Dear Dre Fearing: We tried to reach you by the phone number listed in your chart but was unable. Please call the office, you are scheduled for an appointment January 3, 2022 but we need to reschedule that, the doctors needs to be in the ICU that day.          Sincerely,        Cadence Link CMA ([facility])

## 2022-01-03 RX ORDER — BISACODYL 5 MG
TABLET, DELAYED RELEASE (ENTERIC COATED) ORAL
Qty: 2 TABLET | Refills: 0 | Status: SHIPPED | OUTPATIENT
Start: 2022-01-03

## 2022-01-03 RX ORDER — POLYETHYLENE GLYCOL 3350 17 G/17G
POWDER, FOR SOLUTION ORAL
Qty: 238 G | Refills: 0 | Status: SHIPPED | OUTPATIENT
Start: 2022-01-03

## 2022-01-03 NOTE — TELEPHONE ENCOUNTER
Called pt; he is now r/s'd for [facility] Dr Jamaal Toth colon/egd, 2/7/22 at 930am, miralax, covid positive on 11/18/21. Reviewed bowel prep instructions with patient over phone and mailed to home address.

## 2022-01-20 DIAGNOSIS — I50.9 ACUTE ON CHRONIC CONGESTIVE HEART FAILURE, UNSPECIFIED HEART FAILURE TYPE (HCC): ICD-10-CM

## 2022-01-20 RX ORDER — POTASSIUM CHLORIDE 750 MG/1
TABLET, EXTENDED RELEASE ORAL
Qty: 30 TABLET | Refills: 3 | Status: SHIPPED | OUTPATIENT
Start: 2022-01-20

## 2022-01-24 ENCOUNTER — HOSPITAL ENCOUNTER (OUTPATIENT)
Dept: PREADMISSION TESTING | Age: 52
Discharge: HOME | End: 2022-01-28

## 2022-01-24 VITALS — HEIGHT: 71 IN | BODY MASS INDEX: 41.44 KG/M2 | WEIGHT: 296 LBS

## 2022-01-24 NOTE — PROGRESS NOTES

## 2022-04-14 DIAGNOSIS — I50.9 ACUTE ON CHRONIC CONGESTIVE HEART FAILURE, UNSPECIFIED HEART FAILURE TYPE (HCC): ICD-10-CM

## 2022-04-15 RX ORDER — FUROSEMIDE 40 MG/1
TABLET ORAL
Qty: 60 TABLET | Refills: 3 | Status: SHIPPED | OUTPATIENT
Start: 2022-04-15 | End: 2022-10-07

## 2022-10-05 DIAGNOSIS — I50.9 ACUTE ON CHRONIC CONGESTIVE HEART FAILURE, UNSPECIFIED HEART FAILURE TYPE (HCC): ICD-10-CM

## 2022-10-07 RX ORDER — FUROSEMIDE 40 MG/1
TABLET ORAL
Qty: 60 TABLET | Refills: 0 | Status: SHIPPED | OUTPATIENT
Start: 2022-10-07

## 2022-10-07 NOTE — TELEPHONE ENCOUNTER
Patient will call back to schedule with Rabia Gonzalez within 30 days. Understands appt needed before next refill.